# Patient Record
Sex: FEMALE | Race: WHITE | NOT HISPANIC OR LATINO | Employment: UNEMPLOYED | URBAN - METROPOLITAN AREA
[De-identification: names, ages, dates, MRNs, and addresses within clinical notes are randomized per-mention and may not be internally consistent; named-entity substitution may affect disease eponyms.]

---

## 2017-01-02 ENCOUNTER — OFFICE VISIT (OUTPATIENT)
Dept: URGENT CARE | Facility: MEDICAL CENTER | Age: 22
End: 2017-01-02
Payer: COMMERCIAL

## 2017-01-02 PROCEDURE — G0382 LEV 3 HOSP TYPE B ED VISIT: HCPCS

## 2017-01-02 PROCEDURE — 99283 EMERGENCY DEPT VISIT LOW MDM: CPT

## 2017-01-16 ENCOUNTER — ALLSCRIPTS OFFICE VISIT (OUTPATIENT)
Dept: OTHER | Facility: OTHER | Age: 22
End: 2017-01-16

## 2017-02-01 ENCOUNTER — ALLSCRIPTS OFFICE VISIT (OUTPATIENT)
Dept: OTHER | Facility: OTHER | Age: 22
End: 2017-02-01

## 2017-02-01 ENCOUNTER — LAB CONVERSION - ENCOUNTER (OUTPATIENT)
Dept: OTHER | Facility: OTHER | Age: 22
End: 2017-02-01

## 2017-02-01 DIAGNOSIS — R19.4 CHANGE IN BOWEL HABITS: ICD-10-CM

## 2017-02-01 DIAGNOSIS — R10.2 PELVIC AND PERINEAL PAIN: ICD-10-CM

## 2017-02-01 LAB — ERYTHROCYTE SEDIMENTATION RATE (HISTORICAL): 6 MM/H

## 2017-02-02 ENCOUNTER — GENERIC CONVERSION - ENCOUNTER (OUTPATIENT)
Dept: OTHER | Facility: OTHER | Age: 22
End: 2017-02-02

## 2017-02-02 ENCOUNTER — LAB CONVERSION - ENCOUNTER (OUTPATIENT)
Dept: OTHER | Facility: OTHER | Age: 22
End: 2017-02-02

## 2017-02-02 LAB
25(OH)D3 SERPL-MCNC: 20 NG/ML (ref 30–100)
A/G RATIO (HISTORICAL): 1.5 (CALC) (ref 1–2.5)
ALBUMIN SERPL BCP-MCNC: 4.2 G/DL (ref 3.6–5.1)
ALP SERPL-CCNC: 72 U/L (ref 33–115)
ALT SERPL W P-5'-P-CCNC: 9 U/L (ref 6–29)
AST SERPL W P-5'-P-CCNC: 17 U/L (ref 10–30)
BASOPHILS # BLD AUTO: 0.4 %
BASOPHILS # BLD AUTO: 16 CELLS/UL (ref 0–200)
BILIRUB SERPL-MCNC: 0.3 MG/DL (ref 0.2–1.2)
BUN SERPL-MCNC: 13 MG/DL (ref 7–25)
BUN/CREA RATIO (HISTORICAL): NORMAL (CALC) (ref 6–22)
CALCIUM SERPL-MCNC: 9 MG/DL (ref 8.6–10.2)
CHLORIDE SERPL-SCNC: 104 MMOL/L (ref 98–110)
CO2 SERPL-SCNC: 27 MMOL/L (ref 20–31)
CREAT SERPL-MCNC: 0.83 MG/DL (ref 0.5–1.1)
DEPRECATED RDW RBC AUTO: 12.6 % (ref 11–15)
EGFR AFRICAN AMERICAN (HISTORICAL): 114 ML/MIN/1.73M2
EGFR-AMERICAN CALC (HISTORICAL): 99 ML/MIN/1.73M2
EOSINOPHIL # BLD AUTO: 0.9 %
EOSINOPHIL # BLD AUTO: 36 CELLS/UL (ref 15–500)
GAMMA GLOBULIN (HISTORICAL): 2.8 G/DL (CALC) (ref 1.9–3.7)
GLUCOSE (HISTORICAL): 67 MG/DL (ref 65–99)
HCT VFR BLD AUTO: 34.9 % (ref 35–45)
HGB BLD-MCNC: 11.6 G/DL (ref 11.7–15.5)
LYMPHOCYTES # BLD AUTO: 1796 CELLS/UL (ref 850–3900)
LYMPHOCYTES # BLD AUTO: 44.9 %
MCH RBC QN AUTO: 29.6 PG (ref 27–33)
MCHC RBC AUTO-ENTMCNC: 33.3 G/DL (ref 32–36)
MCV RBC AUTO: 88.9 FL (ref 80–100)
MONOCYTES # BLD AUTO: 372 CELLS/UL (ref 200–950)
MONOCYTES (HISTORICAL): 9.3 %
NEUTROPHILS # BLD AUTO: 1780 CELLS/UL (ref 1500–7800)
NEUTROPHILS # BLD AUTO: 44.5 %
PLATELET # BLD AUTO: 214 THOUSAND/UL (ref 140–400)
PMV BLD AUTO: 8.6 FL (ref 7.5–12.5)
POTASSIUM SERPL-SCNC: 3.8 MMOL/L (ref 3.5–5.3)
RBC # BLD AUTO: 3.93 MILLION/UL (ref 3.8–5.1)
SODIUM SERPL-SCNC: 139 MMOL/L (ref 135–146)
TOTAL PROTEIN (HISTORICAL): 7 G/DL (ref 6.1–8.1)
TSH SERPL DL<=0.05 MIU/L-ACNC: 1.07 MIU/L
WBC # BLD AUTO: 4 THOUSAND/UL (ref 3.8–10.8)

## 2017-02-07 ENCOUNTER — ALLSCRIPTS OFFICE VISIT (OUTPATIENT)
Dept: OTHER | Facility: OTHER | Age: 22
End: 2017-02-07

## 2017-02-09 ENCOUNTER — GENERIC CONVERSION - ENCOUNTER (OUTPATIENT)
Dept: OTHER | Facility: OTHER | Age: 22
End: 2017-02-09

## 2017-02-15 ENCOUNTER — ALLSCRIPTS OFFICE VISIT (OUTPATIENT)
Dept: OTHER | Facility: OTHER | Age: 22
End: 2017-02-15

## 2017-02-15 PROCEDURE — G0145 SCR C/V CYTO,THINLAYER,RESCR: HCPCS | Performed by: OBSTETRICS & GYNECOLOGY

## 2017-02-16 ENCOUNTER — LAB REQUISITION (OUTPATIENT)
Dept: LAB | Facility: HOSPITAL | Age: 22
End: 2017-02-16
Payer: COMMERCIAL

## 2017-02-16 DIAGNOSIS — Z01.419 ENCOUNTER FOR GYNECOLOGICAL EXAMINATION WITHOUT ABNORMAL FINDING: ICD-10-CM

## 2017-02-20 ENCOUNTER — ANESTHESIA EVENT (OUTPATIENT)
Dept: GASTROENTEROLOGY | Facility: HOSPITAL | Age: 22
End: 2017-02-20
Payer: COMMERCIAL

## 2017-02-21 ENCOUNTER — GENERIC CONVERSION - ENCOUNTER (OUTPATIENT)
Dept: OTHER | Facility: OTHER | Age: 22
End: 2017-02-21

## 2017-02-21 ENCOUNTER — ANESTHESIA (OUTPATIENT)
Dept: GASTROENTEROLOGY | Facility: HOSPITAL | Age: 22
End: 2017-02-21
Payer: COMMERCIAL

## 2017-02-21 ENCOUNTER — HOSPITAL ENCOUNTER (OUTPATIENT)
Facility: HOSPITAL | Age: 22
Setting detail: OUTPATIENT SURGERY
Discharge: HOME | End: 2017-02-21
Attending: INTERNAL MEDICINE | Admitting: INTERNAL MEDICINE
Payer: COMMERCIAL

## 2017-02-21 VITALS
SYSTOLIC BLOOD PRESSURE: 113 MMHG | OXYGEN SATURATION: 10 % | DIASTOLIC BLOOD PRESSURE: 74 MMHG | HEIGHT: 62 IN | WEIGHT: 127.21 LBS | RESPIRATION RATE: 16 BRPM | BODY MASS INDEX: 23.41 KG/M2 | TEMPERATURE: 98.2 F | HEART RATE: 77 BPM

## 2017-02-21 DIAGNOSIS — R19.4 CHANGE IN BOWEL HABITS: ICD-10-CM

## 2017-02-21 DIAGNOSIS — R10.9 ABDOMINAL PAIN: ICD-10-CM

## 2017-02-21 LAB — EXT PREGNANCY TEST URINE: NEGATIVE

## 2017-02-21 PROCEDURE — 81025 URINE PREGNANCY TEST: CPT | Performed by: ANESTHESIOLOGY

## 2017-02-21 PROCEDURE — 88342 IMHCHEM/IMCYTCHM 1ST ANTB: CPT | Performed by: INTERNAL MEDICINE

## 2017-02-21 PROCEDURE — 88305 TISSUE EXAM BY PATHOLOGIST: CPT | Performed by: INTERNAL MEDICINE

## 2017-02-21 RX ORDER — ALBUTEROL SULFATE 90 UG/1
2 AEROSOL, METERED RESPIRATORY (INHALATION) EVERY 6 HOURS PRN
COMMUNITY

## 2017-02-21 RX ORDER — PROPOFOL 10 MG/ML
INJECTION, EMULSION INTRAVENOUS AS NEEDED
Status: DISCONTINUED | OUTPATIENT
Start: 2017-02-21 | End: 2017-02-21 | Stop reason: SURG

## 2017-02-21 RX ORDER — SODIUM CHLORIDE, SODIUM LACTATE, POTASSIUM CHLORIDE, CALCIUM CHLORIDE 600; 310; 30; 20 MG/100ML; MG/100ML; MG/100ML; MG/100ML
125 INJECTION, SOLUTION INTRAVENOUS CONTINUOUS
Status: DISCONTINUED | OUTPATIENT
Start: 2017-02-21 | End: 2017-02-21 | Stop reason: HOSPADM

## 2017-02-21 RX ADMIN — SODIUM CHLORIDE, SODIUM LACTATE, POTASSIUM CHLORIDE, AND CALCIUM CHLORIDE: 600; 310; 30; 20 INJECTION, SOLUTION INTRAVENOUS at 07:56

## 2017-02-21 RX ADMIN — PROPOFOL 50 MG: 10 INJECTION, EMULSION INTRAVENOUS at 08:15

## 2017-02-21 RX ADMIN — PROPOFOL 100 MG: 10 INJECTION, EMULSION INTRAVENOUS at 08:00

## 2017-02-21 RX ADMIN — PROPOFOL 50 MG: 10 INJECTION, EMULSION INTRAVENOUS at 08:19

## 2017-02-21 RX ADMIN — PROPOFOL 50 MG: 10 INJECTION, EMULSION INTRAVENOUS at 08:01

## 2017-02-21 RX ADMIN — SODIUM CHLORIDE, SODIUM LACTATE, POTASSIUM CHLORIDE, AND CALCIUM CHLORIDE 125 ML/HR: 600; 310; 30; 20 INJECTION, SOLUTION INTRAVENOUS at 07:41

## 2017-02-21 RX ADMIN — PROPOFOL 50 MG: 10 INJECTION, EMULSION INTRAVENOUS at 08:07

## 2017-02-21 RX ADMIN — PROPOFOL 50 MG: 10 INJECTION, EMULSION INTRAVENOUS at 08:12

## 2017-02-21 RX ADMIN — PROPOFOL 50 MG: 10 INJECTION, EMULSION INTRAVENOUS at 08:02

## 2017-02-21 RX ADMIN — SODIUM CHLORIDE, SODIUM LACTATE, POTASSIUM CHLORIDE, AND CALCIUM CHLORIDE: 600; 310; 30; 20 INJECTION, SOLUTION INTRAVENOUS at 08:20

## 2017-02-23 LAB
LAB AP GYN PRIMARY INTERPRETATION: NORMAL
Lab: NORMAL

## 2017-02-24 ENCOUNTER — GENERIC CONVERSION - ENCOUNTER (OUTPATIENT)
Dept: OTHER | Facility: OTHER | Age: 22
End: 2017-02-24

## 2017-02-27 ENCOUNTER — HOSPITAL ENCOUNTER (OUTPATIENT)
Dept: ULTRASOUND IMAGING | Facility: HOSPITAL | Age: 22
Discharge: HOME | End: 2017-02-27
Attending: OBSTETRICS & GYNECOLOGY
Payer: COMMERCIAL

## 2017-02-27 DIAGNOSIS — R10.2 PELVIC AND PERINEAL PAIN: ICD-10-CM

## 2017-02-27 PROCEDURE — 76856 US EXAM PELVIC COMPLETE: CPT

## 2017-02-27 PROCEDURE — 76830 TRANSVAGINAL US NON-OB: CPT

## 2017-03-01 ENCOUNTER — GENERIC CONVERSION - ENCOUNTER (OUTPATIENT)
Dept: OTHER | Facility: OTHER | Age: 22
End: 2017-03-01

## 2017-03-06 ENCOUNTER — GENERIC CONVERSION - ENCOUNTER (OUTPATIENT)
Dept: OTHER | Facility: OTHER | Age: 22
End: 2017-03-06

## 2017-03-31 ENCOUNTER — GENERIC CONVERSION - ENCOUNTER (OUTPATIENT)
Dept: OTHER | Facility: OTHER | Age: 22
End: 2017-03-31

## 2017-04-17 DIAGNOSIS — R10.2 PELVIC AND PERINEAL PAIN: ICD-10-CM

## 2017-04-17 DIAGNOSIS — Z97.5 PRESENCE OF (INTRAUTERINE) CONTRACEPTIVE DEVICE: ICD-10-CM

## 2017-06-22 ENCOUNTER — ALLSCRIPTS OFFICE VISIT (OUTPATIENT)
Dept: OTHER | Facility: OTHER | Age: 22
End: 2017-06-22

## 2017-07-06 ENCOUNTER — HOSPITAL ENCOUNTER (OUTPATIENT)
Dept: RADIOLOGY | Facility: HOSPITAL | Age: 22
Discharge: HOME | End: 2017-07-06
Attending: OBSTETRICS & GYNECOLOGY
Payer: COMMERCIAL

## 2017-07-06 DIAGNOSIS — Z97.5 PRESENCE OF (INTRAUTERINE) CONTRACEPTIVE DEVICE: ICD-10-CM

## 2017-07-06 DIAGNOSIS — R10.2 PELVIC AND PERINEAL PAIN: ICD-10-CM

## 2017-07-06 PROCEDURE — 76856 US EXAM PELVIC COMPLETE: CPT

## 2017-07-06 PROCEDURE — 76830 TRANSVAGINAL US NON-OB: CPT

## 2017-07-07 ENCOUNTER — ALLSCRIPTS OFFICE VISIT (OUTPATIENT)
Dept: OTHER | Facility: OTHER | Age: 22
End: 2017-07-07

## 2017-07-11 ENCOUNTER — GENERIC CONVERSION - ENCOUNTER (OUTPATIENT)
Dept: OTHER | Facility: OTHER | Age: 22
End: 2017-07-11

## 2017-07-17 ENCOUNTER — ALLSCRIPTS OFFICE VISIT (OUTPATIENT)
Dept: OTHER | Facility: OTHER | Age: 22
End: 2017-07-17

## 2017-07-17 LAB — S PYO AG THROAT QL: NEGATIVE

## 2017-10-02 ENCOUNTER — ALLSCRIPTS OFFICE VISIT (OUTPATIENT)
Dept: OTHER | Facility: OTHER | Age: 22
End: 2017-10-02
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
## 2017-10-05 ENCOUNTER — ALLSCRIPTS OFFICE VISIT (OUTPATIENT)
Dept: OTHER | Facility: OTHER | Age: 22
End: 2017-10-05
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
## 2018-01-10 NOTE — RESULT NOTES
Message   Thyroid is stable-  please add vitamin d 4,000 iu daily; mild anemia- may be due to stomach issues  we will follow  Verified Results  (Q) TSH, 3RD GENERATION [id number] 01:33PM Armando Blum     Test Name Result Flag Reference   TSH 1 07 mIU/L     Reference Range                         > or = 20 Years  0 40-4 50                              Pregnancy Ranges            First trimester    0 26-2 66            Second trimester   0 55-2 73            Third trimester    0 43-2 91     *(Q) VITAMIN D, 25-HYDROXY, LC/MS/MS 17GXB5165 01:33PM Armando Blum   REPORT COMMENT:  FASTING:NO     Test Name Result Flag Reference   VITAMIN D, 25-OH, TOTAL 20 ng/mL L    Vitamin D Status         25-OH Vitamin D:     Deficiency:                    <20 ng/mL  Insufficiency:             20 - 29 ng/mL  Optimal:                 > or = 30 ng/mL     For 25-OH Vitamin D testing on patients on   D2-supplementation and patients for whom quantitation   of D2 and D3 fractions is required, the QuestAssureD(TM)  25-OH VIT D, (D2,D3), LC/MS/MS is recommended: order   code 74445 (patients >2yrs)  For more information on this test, go to:  [email or web address]  (This link is being provided for   informational/educational purposes only )     (1) COMPREHENSIVE METABOLIC PANEL 31CVD9311 49:09RT Armando Blum     Test Name Result Flag Reference   GLUCOSE 67 mg/dL  65-99   Fasting reference interval   UREA NITROGEN (BUN) 13 mg/dL  7-25   CREATININE 0 83 mg/dL  0 50-1 10   eGFR NON-AFR   AMERICAN 99 mL/min/1 73m2  > OR = 60   eGFR AFRICAN AMERICAN 114 mL/min/1 73m2  > OR = 60   BUN/CREATININE RATIO   5-25   NOT APPLICABLE (calc)   SODIUM 139 mmol/L  135-146   POTASSIUM 3 8 mmol/L  3 5-5 3   CHLORIDE 104 mmol/L     CARBON DIOXIDE 27 mmol/L  20-31   CALCIUM 9 0 mg/dL  8 6-10 2   PROTEIN, TOTAL 7 0 g/dL  6 1-8 1   ALBUMIN 4 2 g/dL  3 6-5 1   GLOBULIN 2 8 g/dL (calc)  1 9-3 7 ALBUMIN/GLOBULIN RATIO 1 5 (calc)  1 0-2 5   BILIRUBIN, TOTAL 0 3 mg/dL  0 2-1 2   ALKALINE PHOSPHATASE 72 U/L     AST 17 U/L  10-30   ALT 9 U/L  6-29     (1) CBC/PLT/DIFF 22SHV9622 01:33PM Jaquelin Rodriguez     Test Name Result Flag Reference   WHITE BLOOD CELL COUNT 4 0 Thousand/uL  3 8-10 8   RED BLOOD CELL COUNT 3 93 Million/uL  3 80-5 10   HEMOGLOBIN 11 6 g/dL L 11 7-15 5   HEMATOCRIT 34 9 % L 35 0-45 0   MCV 88 9 fL  80 0-100 0   MCH 29 6 pg  27 0-33 0   MCHC 33 3 g/dL  32 0-36 0   RDW 12 6 %  11 0-15 0   PLATELET COUNT 964 Thousand/uL  140-400   MPV 8 6 fL  7 5-12 5   ABSOLUTE NEUTROPHILS 1780 cells/uL  3288-8193   ABSOLUTE LYMPHOCYTES 1796 cells/uL  850-3900   ABSOLUTE MONOCYTES 372 cells/uL  200-950   ABSOLUTE EOSINOPHILS 36 cells/uL     ABSOLUTE BASOPHILS 16 cells/uL  0-200   NEUTROPHILS 44 5 %     LYMPHOCYTES 44 9 %     MONOCYTES 9 3 %     EOSINOPHILS 0 9 %     BASOPHILS 0 4 %

## 2018-01-10 NOTE — RESULT NOTES
Verified Results  * [facility] [id number] 83:53VQ Reesa Leyden Order Number: [id number]    - Patient Instructions: To schedule this appointment, please contact Central Scheduling at [phone]  Test Name Result Flag Reference   US PELVIS COMPLETE (TRANSABDOMINAL AND TRANSVAGINAL) (Report)     PELVIC ULTRASOUND, COMPLETE     INDICATION: Right lower quadrant cramping  Check IUD placement  LMP was February 1, 2017      COMPARISON: February 1, 2016     TECHNIQUE:  Transabdominal pelvic ultrasound was performed in sagittal and transverse planes with a curvilinear transducer  Additional transvaginal imaging was performed to better evaluate the endometrium and ovaries  Imaging included volumetric    sweeps as well as traditional still imaging technique  FINDINGS:     UTERUS:   The uterus is anteverted in position, measuring 6 7 x 3 2 x 4 3 cm  Contour and echotexture appear normal      The cervix shows no suspicious abnormality  ENDOMETRIUM:    Normal caliber of 3 mm  No suspicious endometrial pathology visible  The intrauterine device appears satisfactory in position  OVARIES/ADNEXA:   Right ovary: 3 0 x 2 4 x 2 3 cm  A circumscribed hypoechoic region within the ovary measuring 2 2 x 1 5 x 1 8 cm demonstrates some minimal peripheral blood flow but no significant internal blood flow  This is probably a hemorrhagic cyst    Doppler flow within normal limits  Left ovary: 2 2 x 1 0 x 1 0 cm  No suspicious left ovarian abnormality  Doppler flow within normal limits  There are mildly prominent adnexal vessels on the left side which appear patent  Correlate for any typical signs or symptoms of pelvic congestion syndrome  No suspicious adnexal mass or loculated collections  There is no free fluid  IMPRESSION:      Probable hemorrhagic cyst in the right ovary   Satisfactory positioning of IUD in the endometrial canal  Possible pelvic congestion in the left adnexal area with some distended vessels noted            Workstation performed: [id number]     Signed by:   Rocael Benz MD   3/1/17

## 2018-01-10 NOTE — RESULT NOTES
Verified Results  TISSUE, SPECIMEN A 02Jun2016 12:00AM [employer]     Test Name Result Flag Reference   A SOURCE      Right arm, bicep area   A PROCEDURE      Biopsy/ies   A GROSS DESCRIPTION      Received in formalin labeled with the patient's   name is a punch biopsy of skin measuring 3 x 3 x   5 mm  The specimen is entirely submitted in one   cassette  A MICRO DESCRIPTION      In this biopsy, there is elongation of the rete   ridges with hyperpigmentation of the basal layer   and single and nested melanocytes at the   dermal-epidermal junction  Focally, the rete   ridges are fused and there is melanin   pigmentation in melanophages in the superficial   dermis  In the central portion of this specimen,   there are melanocytic nevus cell nests in the   superficial dermis     A DIAGNOSIS      LENTIGINOUS COMPOUND MELANOCYTIC NEVUS

## 2018-01-11 NOTE — RESULT NOTES
Verified Results  (1) THIN PREP PAP WITH IMAGING [id number] 18:75MB Dallin Asper     Test Name Result Flag Reference   LAB AP CASE REPORT (Report)     Gynecologic Cytology Report            Case: [id number]                  Authorizing Provider: [facility] DO DWIGHT     Collected:      02/15/2017           First Screen:     TONJA Hill Received:      02/17/2017 1008        Specimen:  LIQUID-BASED PAP, SCREENING, Endocervical   LAB AP GYN PRIMARY INTERPRETATION      Negative for intraepithelial lesion or malignancy  Electronically signed by TONJA Hill on 2/23/2017 at 3:51 PM   LAB AP GYN SPECIMEN ADEQUACY      Satisfactory for evaluation  Absence of endocervical/transformation zone component  LAB AP GYN ADDITIONAL INFORMATION (Report)     [employer]'s FDA approved ,  and ThinPrep Imaging System are   utilized with strict adherence to the 's instruction manual to   prepare gynecologic and non-gynecologic cytology specimens for the   production of ThinPrep slides as well as for gynecologic ThinPrep imaging  These processes have been validated by our laboratory and/or by the     The Pap test is not a diagnostic procedure and should not be used as the   sole means to detect cervical cancer  It is only a screening procedure to   aid in the detection of cervical cancer and its precursors  Both   false-negative and false-positive results have been experienced  Your   patient's test result should be interpreted in this context together with   the history and clinical findings

## 2018-01-12 VITALS
BODY MASS INDEX: 23.81 KG/M2 | SYSTOLIC BLOOD PRESSURE: 130 MMHG | HEART RATE: 87 BPM | DIASTOLIC BLOOD PRESSURE: 86 MMHG | HEIGHT: 62 IN | WEIGHT: 129.38 LBS

## 2018-01-12 VITALS
SYSTOLIC BLOOD PRESSURE: 116 MMHG | OXYGEN SATURATION: 99 % | TEMPERATURE: 98.2 F | DIASTOLIC BLOOD PRESSURE: 66 MMHG | RESPIRATION RATE: 20 BRPM | BODY MASS INDEX: 22.88 KG/M2 | WEIGHT: 131.2 LBS | HEART RATE: 90 BPM

## 2018-01-12 VITALS
WEIGHT: 130 LBS | SYSTOLIC BLOOD PRESSURE: 118 MMHG | BODY MASS INDEX: 23.78 KG/M2 | DIASTOLIC BLOOD PRESSURE: 70 MMHG | TEMPERATURE: 98.3 F | HEART RATE: 101 BPM | OXYGEN SATURATION: 98 %

## 2018-01-12 VITALS
DIASTOLIC BLOOD PRESSURE: 88 MMHG | SYSTOLIC BLOOD PRESSURE: 132 MMHG | WEIGHT: 132 LBS | HEART RATE: 88 BPM | HEIGHT: 62 IN | BODY MASS INDEX: 24.29 KG/M2 | RESPIRATION RATE: 16 BRPM

## 2018-01-12 NOTE — RESULT NOTES
Verified Results  * US PELVIS COMPLETE [facility] OF [place] AND TRANSVAGINAL) [id number] Mayra Coto Order Number: [id number]     Test Name Result Flag Reference   US PELVIS COMPLETE (TRANSABDOMINAL AND TRANSVAGINAL) (Report)     PELVIC ULTRASOUND, COMPLETE     INDICATION: 64-year-old female with pelvic pain  History of IUD inserted about 6 months ago  LMP was 1 week ago  COMPARISON: None  TECHNIQUE:  Transabdominal pelvic ultrasound was performed in sagittal and transverse planes with a curvilinear transducer  Additional transvaginal imaging was performed to better evaluate the endometrium and ovaries  Imaging included volumetric    sweeps as well as traditional still imaging technique  FINDINGS:     UTERUS:   The uterus is retroverted in position, measuring 6 7 x 3 1 x 4 2 cm  Contour and echotexture appear normal      The cervix shows no suspicious abnormality  ENDOMETRIUM:    Normal caliber of 4 mm  Homogenous and normal in appearance  An IUD is visualized and is in proper position within the endometrial cavity  OVARIES/ADNEXA:   Right ovary: 1 9 x 2 5 x 2 2 cm  No suspicious right ovarian abnormality  Doppler flow within normal limits  Left ovary: 2 8 x 1 5 x 2 0 cm  No suspicious left ovarian abnormality  Doppler flow within normal limits  No suspicious adnexal mass or loculated collections  There is small amount of simple free fluid in the pelvis, likely physiologic in this premenopausal female  IMPRESSION:      Normal pelvic ultrasound  An IUD is visualized and is in proper position within the endometrial cavity          Signed by:   Braydon Bejarano MD   2/1/16

## 2018-01-12 NOTE — RESULT NOTES
Message   Pelase inform the patient that the bx show inflamation in stomach but no bacteria  no need for repeat EGD unless alarm symptoms   Colon: normal, repeat at the age of 46   Followup in [facility] ein 3-4 months   Repeat at the age of 46     Verified Results  COLONOSCOPY [id number] 02:20PM Christiano De La Rosa     Test Name Result Flag Reference   Colonoscopy 02/2/2017        Summary / No summary entered :      No summary entered   Documents attached :      EPIC OP NOTE - Christiano De La Rosa; Enc: [id number] - Appointment -      Christiano De La Rosa - (Gastroenterology Adult) (Additional Information      Document)  (1) TISSUE EXAM [id number] 08:04AM Christiano De La Rosa     Test Name Result Flag Reference   LAB AP CASE REPORT (Report)     Surgical Pathology Report             Case: [id number]                   Authorizing Provider: Juan Jose Porter MD    Collected:      02/21/2017 0804        Ordering Location:   Located within [facility]    Received:      02/21/2017 [facility] Endoscopy                               Pathologist:      Francine Wray MD                              Specimens:  A) - Duodenum, R/o Celiac                                       B) - Stomach, Gastric body, r/o H  pylori                               C) - Colon, Terminal ilium, r/o Crohn's disease                            D) - Colon, Random, r/o Microscopic colitis   LAB AP FINAL DIAGNOSIS (Report)     A  Small intestine, duodenum, biopsy:    - No significant pathologic abnormalities  - No villous atrophy, increased intraepithelial lymphocytes or crypt   hyperplasia to suggest     malabsorptive enteropathy     - No active inflammation, granulomas, organisms, dysplasia or neoplasia   identified  B  Stomach, body, biopsy:    - Oxyntic mucosa with mild chronic inactive gastritis  - Immunostain for H  pylori (with appropriate positive control) is   negative  - No intestinal metaplasia, dysplasia or neoplasia identified       C  Small intestine, terminal ileum, biopsy:    - No significant pathologic abnormalities  - No active inflammation, granulomas, dysplasia or neoplasia   identified  D  Colon, random biosy:    - No significant pathologic abnormalities  - No active inflammation or histologic evidence of a microscopic   (lymphocytic) or collagenous colitis noted  - No granulomas, dysplasia or neoplasia identified  Electronically signed by Josh Paredes MD on 2/23/2017 at 10:50 AM   LAB AP SURGICAL ADDITIONAL INFORMATION (Report)     These tests were developed and their performance characteristics   determined by [facility] or [place]  They may not be cleared or approved by the [place]  Food and   Drug Administration  The FDA has determined that such clearance or   approval is not necessary  These tests are used for clinical purposes  They should not be regarded as investigational or for research  This   laboratory has been approved by [place] 88, designated as a high-complexity   laboratory and is qualified to perform these tests  LAB AP GROSS DESCRIPTION (Report)     A  The specimen is received in formalin, labeled with the patient's name   and hospital number, and is designated duodenum rule out celiac  The   specimen consists of 5 tan red soft tissue fragments each measuring   0 3-0 5 cm  Entirely submitted  One cassette  B  The specimen is received in formalin, labeled with the patient's name   and hospital number, and is designated gastric body rule out H  pylori  The specimen consists of 2 tan soft tissue fragments measuring 0 2 and 0 5   cm  Entirely submitted  One cassette  Note: The estimated total formalin fixation time based upon information   provided by the submitting clinician and the standard processing schedule   is 20 5 hours  C   The specimen is received in formalin, labeled with the patient's name   and hospital number, and is designated terminal ileum rule out Crohn's   disease  The specimen consists of 2 tan soft tissue fragments measuring   0 3 and 0 5 cm  Entirely submitted  One cassette  D  The specimen is received in formalin, labeled with the patient's name   and hospital number, and is designated random colon rule out microscopic   colitis  The specimen consists of 4 tan soft tissue fragments each   measuring 0 4-0 5 cm  Entirely submitted  One cassette  Note: The estimated total formalin fixation time based upon information   provided by the submitting clinician and the standard processing schedule   is 20 25 hours      [facility]   LAB AP CLINICAL INFORMATION R/o celiac     R/o celiac

## 2018-01-12 NOTE — RESULT NOTES
Verified Results  * [facility]TE [facility] [id number] 54:68YE Maikel Britt Order Number: [id number]    - Patient Instructions: To schedule this appointment, please contact Central Scheduling at [phone]  Test Name Result Flag Reference   US PELVIS COMPLETE (TRANSABDOMINAL AND TRANSVAGINAL) (Report)     PELVIC ULTRASOUND, COMPLETE     INDICATION: R10 2: Pelvic and perineal pain   Z97 5: Presence of (intrauterine) contraceptive device  History taken directly from the electronic ordering system  Patient has IUD in place  COMPARISON: Pelvic ultrasound from 2/27/2017  TECHNIQUE:  Transabdominal pelvic ultrasound was performed in sagittal and transverse planes with a curvilinear transducer  Additional transvaginal imaging was performed to better evaluate the endometrium and ovaries  Imaging included volumetric    sweeps as well as traditional still imaging technique  FINDINGS:     UTERUS:   The uterus is anteverted in position, measuring 7 3 x 2 3 x 4 1 cm  Contour and echotexture appear normal      The cervix shows no suspicious abnormality  ENDOMETRIUM:    Normal caliber of 3 mm  An IUD is in proper position within the endometrial cavity  Homogenous and normal in appearance  OVARIES/ADNEXA:   Right ovary: 2 5 x 1 6 x 2 4 cm  No suspicious right ovarian abnormality  Doppler flow within normal limits  Left ovary: 2 0 x 2 3 x 1 7 cm  No suspicious left ovarian abnormality  Doppler flow within normal limits  No suspicious adnexal mass or loculated collections  There is no free fluid  IMPRESSION:      Normal pelvic ultrasound  An IUD is in proper position within the endometrial cavity            Workstation performed: [id number]     Signed by:   Jose Anderson MD   7/11/17

## 2018-01-12 NOTE — MISCELLANEOUS
Dear Shamar Merchant,    Attempted to contact you regarding results and to schedule a follow-up appointment  Please contact the office at your convenience      Thank you,    Dr Real Montanez and staff        Electronically signed by:Radha Meza MA  Mar 31 2017  9:09AM EST Author

## 2018-01-13 VITALS
BODY MASS INDEX: 24.11 KG/M2 | HEART RATE: 72 BPM | TEMPERATURE: 98.4 F | RESPIRATION RATE: 18 BRPM | DIASTOLIC BLOOD PRESSURE: 84 MMHG | HEIGHT: 62 IN | SYSTOLIC BLOOD PRESSURE: 122 MMHG | WEIGHT: 131 LBS

## 2018-01-13 VITALS
DIASTOLIC BLOOD PRESSURE: 82 MMHG | WEIGHT: 130.38 LBS | HEART RATE: 70 BPM | RESPIRATION RATE: 16 BRPM | TEMPERATURE: 98.1 F | BODY MASS INDEX: 23.1 KG/M2 | SYSTOLIC BLOOD PRESSURE: 120 MMHG | HEIGHT: 63 IN

## 2018-01-14 VITALS
HEART RATE: 72 BPM | RESPIRATION RATE: 16 BRPM | SYSTOLIC BLOOD PRESSURE: 118 MMHG | TEMPERATURE: 98.9 F | HEIGHT: 63 IN | WEIGHT: 131.13 LBS | BODY MASS INDEX: 23.23 KG/M2 | DIASTOLIC BLOOD PRESSURE: 78 MMHG

## 2018-01-14 VITALS
DIASTOLIC BLOOD PRESSURE: 82 MMHG | HEART RATE: 80 BPM | BODY MASS INDEX: 24.88 KG/M2 | TEMPERATURE: 97.6 F | WEIGHT: 135.2 LBS | RESPIRATION RATE: 16 BRPM | SYSTOLIC BLOOD PRESSURE: 124 MMHG | HEIGHT: 62 IN

## 2018-01-14 VITALS
HEART RATE: 90 BPM | BODY MASS INDEX: 24.93 KG/M2 | DIASTOLIC BLOOD PRESSURE: 80 MMHG | SYSTOLIC BLOOD PRESSURE: 126 MMHG | HEIGHT: 62 IN | WEIGHT: 135.5 LBS

## 2018-01-14 NOTE — PROGRESS NOTES
Patient Health Assessment    Date:            12/07/2016  Blood Pressure:  121/85  Pulse:           65  Age:             21  Weight:          130 lbs  Height/Length:   5' 2"  Body Mass Index: 23 7  Provider:        [email or web address]  Clinic:          [facility]    RECALL EXAM, ADULT PROPHY , 1 BWX LEFT PER DR REQUEST  Medical Alert: Anemia    Asthma  Medications: Citalopram HBr:  Since Last Visit: Medical Alert: No Change    Medications: No Change    Allergies:        No Change  Pain Scale Type: Numeric Pain ScalePain Level: 0  Description: no current dental pain  pt reports some sensivity to cold and brushing / pt is using soft toothbrush  / Sensodyne  scaled, polished and flossed- light calculus, plaque and bleeding  Dr Jenny Meier exam= no findings  nv= 6 mth recall    ----- Signed on Wednesday, December 07, 2016 at 1:41:24 PM  -----  ----- Provider: [email or web address] - Keyur Hinojosa RD -- Clinic: [facility] -----

## 2018-01-16 NOTE — RESULT NOTES
Message   Please add vitamin D 5,000 iu daily  Thyroid is in normal range  Verified Results  (Q) TSH, 3RD GENERATION [id number] 01:33PM Alex Part     Test Name Result Flag Reference   TSH 1 07 mIU/L     Reference Range                         > or = 20 Years  0 40-4 50                              Pregnancy Ranges            First trimester    0 26-2 66            Second trimester   0 55-2 73            Third trimester    0 43-2 91     *(Q) VITAMIN D, 25-HYDROXY, LC/MS/MS [id number] 01:33PM Alex Part   REPORT COMMENT:  FASTING:NO     Test Name Result Flag Reference   VITAMIN D, 25-OH, TOTAL 20 ng/mL L    Vitamin D Status         25-OH Vitamin D:     Deficiency:                    <20 ng/mL  Insufficiency:             20 - 29 ng/mL  Optimal:                 > or = 30 ng/mL     For 25-OH Vitamin D testing on patients on   D2-supplementation and patients for whom quantitation   of D2 and D3 fractions is required, the QuestAssureD(TM)  25-OH VIT D, (D2,D3), LC/MS/MS is recommended: order   code 04214 (patients >2yrs)  For more information on this test, go to:  http://education  Lingohub/faq/NTQ599  (This link is being provided for   informational/educational purposes only )
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
## 2018-01-18 NOTE — RESULT NOTES
Verified Results  (1) CBC/PLT/DIFF [id number] 12:37PM [facility]     Test Name Result Flag Reference   WHITE BLOOD CELL COUNT 4 3 Thousand/uL  3 8-10 8   RED BLOOD CELL COUNT 4 20 Million/uL  3 80-5 10   HEMOGLOBIN 12 7 g/dL  11 7-15 5   HEMATOCRIT 38 5 %  35 0-45 0   MCV 91 7 fL  80 0-100 0   MCH 30 3 pg  27 0-33 0   MCHC 33 0 g/dL  32 0-36 0   RDW 12 5 %  11 0-15 0   PLATELET COUNT 346 Thousand/uL  140-400   MPV 9 2 fL  7 5-11 5   ABSOLUTE NEUTROPHILS 1952 cells/uL  0275-7492   ABSOLUTE LYMPHOCYTES 2008 cells/uL  850-3900   ABSOLUTE MONOCYTES 301 cells/uL  200-950   ABSOLUTE EOSINOPHILS 26 cells/uL     ABSOLUTE BASOPHILS 13 cells/uL  0-200   NEUTROPHILS 45 4 %     LYMPHOCYTES 46 7 %     MONOCYTES 7 0 %     EOSINOPHILS 0 6 %     BASOPHILS 0 3 %       (1) COMPREHENSIVE METABOLIC PANEL [id number] 81:45GF [facility]     Test Name Result Flag Reference   GLUCOSE 89 mg/dL  65-99   Fasting reference interval   UREA NITROGEN (BUN) 15 mg/dL  7-25   CREATININE 0 84 mg/dL  0 50-1 10   eGFR NON-AFR   AMERICAN 98 mL/min/1 73m2  > OR = 60   eGFR AFRICAN AMERICAN 114 mL/min/1 73m2  > OR = 60   BUN/CREATININE RATIO   5-94   NOT APPLICABLE (calc)   SODIUM 137 mmol/L  135-146   POTASSIUM 3 8 mmol/L  3 5-5 3   CHLORIDE 102 mmol/L     CARBON DIOXIDE 27 mmol/L  20-31   CALCIUM 9 5 mg/dL  8 6-10 2   PROTEIN, TOTAL 7 5 g/dL  6 1-8 1   ALBUMIN 4 6 g/dL  3 6-5 1   GLOBULIN 2 9 g/dL (calc)  1 9-3 7   ALBUMIN/GLOBULIN RATIO 1 6 (calc)  1 0-2 5   BILIRUBIN, TOTAL 0 4 mg/dL  0 2-1 2   ALKALINE PHOSPHATASE 72 U/L     AST 16 U/L  10-30   ALT 12 U/L  6-29     (Q) TSH, 3RD GENERATION [id number] 12:37PM [facility]     Test Name Result Flag Reference   TSH 1 64 mIU/L     Reference Range                         > or = 20 Years  0 40-4 50                              Pregnancy Ranges            First trimester    0 26-2 66            Second trimester   0 55-2 73            Third trimester    0 43-2 91 *(Q) VITAMIN D, 25-HYDROXY, LC/MS/MS [id number] 12:37PM Fabiola Solid   REPORT COMMENT:  FASTING:YES     Test Name Result Flag Reference   VITAMIN D, 25-OH, TOTAL 24 ng/mL L    Vitamin D Status         25-OH Vitamin D:     Deficiency:                    <20 ng/mL  Insufficiency:             20 - 29 ng/mL  Optimal:                 > or = 30 ng/mL     For 25-OH Vitamin D testing on patients on   D2-supplementation and patients for whom quantitation   of D2 and D3 fractions is required, the QuestAssureD(TM)  25-OH VIT D, (D2,D3), LC/MS/MS is recommended: order   code 50379 (patients >2yrs)  For more information on this test, go to:  http://Lucky Pai/faq/VKC787  (This link is being provided for   informational/educational purposes only )

## 2018-01-24 NOTE — PROCEDURES
Chief Complaint    1  Skin Lesions  patient here for nevus x2 that have changed, gotten larger and darker      Current Meds   1  Citalopram Hydrobromide 10 MG Oral Tablet; Take 1 tablet daily; Therapy: 69KEI2745 to (Evaluate:53Tbw2446)  Requested for: 26IHA5110; Last   Rx:25May2016 Ordered   2  Ibuprofen 200 MG Oral Capsule; Therapy: (Recorded:14Jan2016) to Recorded   3  Naproxen 500 MG Oral Tablet; Therapy: 04ALU2529 to (Evaluate:23May2016) Recorded   4  Sharon 13 5 MG Intrauterine Intrauterine Device; Therapy: (Recorded:96Mmr3554) to Recorded    Allergies    1  No Known Drug Allergies    2  No Known Environmental Allergies   3  No Known Food Allergies    Vitals  Signs [Data Includes: Current Encounter]    Temperature: 96 9 F  Heart Rate: 82  Respiration: 16  Systolic: 766  Diastolic: 74  Height: 5 ft 2 5 in  Weight: 131 lb 8 0 oz  BMI Calculated: 23 67  BSA Calculated: 1 61    Procedure    Procedure: excision of lesion  Indications for the procedure include the lesion has changed (RIGHT BICEP AREA 6 MM ;DARKLY PIGMENTED LESION ;[address]; SECOND LESION LEFT HIP 4 MM DIAM IWTH IRREG BORDERS )  Risks and infection risk were discussed with the patient  Written consent was obtained prior to the procedure  Procedure Note:   Anesthesia: 1CC TOTAL ml of bupivacaine 0 5% without epinephrine  Of benzocaine  The lesion was located on the RIGHT BICEP; LEFT HIP  The patient was prepped and draped in the usual sterile fashion using Betadine and using alcohol  a 4MM SHAVE LEFT HIP mm shave biopsy of the lesion was taken and PUNCH BIOPSY RIGHT BICEP 6 MM- ENTIRELY REMOVED- SENT TO PATH; SHAVE BIOPSY LEF THIP- DERMA BLADE USED- SENT TO PATH; ALCL USED TO CLOSE WOUND ON LEFT HIP;; 3 INTERRUPTED MATTERESS SUTURES 5-0 PROLENE USED TO CLOSE RIGHT BICEP WOUNDFOLLOWU P10 DAYS  Closure: horizontal mattress sutures were used for the skin closure  The subcutaneous layer was closed with 3 5-0  Dressing:  The wound was cleaned and a sterile dressing was placed  Post-Procedure:      Assessment    1  Dysplastic nevus of trunk (216 5) (D22 5)    Discussion/Summary    SHAVE BIOPSY 4 MM LEFT HIP  6MM PUNCH BIOPSY RIGHT BICEP=  SENT FOR PATH  FOLLOW UP 10 DAY FOR SUTURE REMOVAL  ADVISED OF POSSIBLE SCAR  Signatures   Electronically signed by :  Aurora 92, DO; Jun 2 2016  1:12PM EST                       (Author)

## 2018-02-22 ENCOUNTER — OFFICE VISIT (OUTPATIENT)
Dept: FAMILY MEDICINE CLINIC | Facility: CLINIC | Age: 23
End: 2018-02-22
Payer: COMMERCIAL

## 2018-02-22 VITALS
WEIGHT: 136.6 LBS | HEART RATE: 82 BPM | DIASTOLIC BLOOD PRESSURE: 90 MMHG | BODY MASS INDEX: 25.14 KG/M2 | SYSTOLIC BLOOD PRESSURE: 138 MMHG | HEIGHT: 62 IN | RESPIRATION RATE: 16 BRPM | TEMPERATURE: 99.4 F

## 2018-02-22 DIAGNOSIS — J01.40 ACUTE NON-RECURRENT PANSINUSITIS: Primary | ICD-10-CM

## 2018-02-22 DIAGNOSIS — R03.0 ELEVATED BLOOD PRESSURE READING: ICD-10-CM

## 2018-02-22 PROBLEM — E55.9 VITAMIN D DEFICIENCY: Status: ACTIVE | Noted: 2018-02-22

## 2018-02-22 PROCEDURE — 99214 OFFICE O/P EST MOD 30 MIN: CPT | Performed by: FAMILY MEDICINE

## 2018-02-22 RX ORDER — DROSPIRENONE AND ETHINYL ESTRADIOL TABLETS 0.02-3(28)
1 KIT ORAL DAILY
Refills: 3 | COMMUNITY
Start: 2018-02-12 | End: 2018-03-27 | Stop reason: ALTCHOICE

## 2018-02-22 RX ORDER — ERGOCALCIFEROL 1.25 MG/1
50000 CAPSULE ORAL
Refills: 0 | Status: CANCELLED | OUTPATIENT
Start: 2018-02-22

## 2018-02-22 RX ORDER — NAPROXEN 500 MG/1
TABLET ORAL
Qty: 20 TABLET | Refills: 0 | Status: SHIPPED | OUTPATIENT
Start: 2018-02-22 | End: 2018-05-02

## 2018-02-22 RX ORDER — AMOXICILLIN AND CLAVULANATE POTASSIUM 875; 125 MG/1; MG/1
1 TABLET, FILM COATED ORAL EVERY 12 HOURS SCHEDULED
Qty: 14 TABLET | Refills: 0 | Status: SHIPPED | OUTPATIENT
Start: 2018-02-22 | End: 2018-03-01

## 2018-02-22 RX ORDER — ALPRAZOLAM 0.25 MG/1
0.25 TABLET ORAL 3 TIMES DAILY PRN
COMMUNITY
Start: 2017-04-18 | End: 2018-12-06

## 2018-02-22 NOTE — PATIENT INSTRUCTIONS
-Start taking Mucinex DM 1200mg twice a day  -Drink at least 10 glasses of water per day  -Honey as been shown to help with coughs  -You can use Tylenol as needed for fevers  -Practice good hygiene and cover your mouth if you cough  -Call us back if you have new or worsening fevers and other symptoms  -Patient instructions handout provided      Sinusitis, Ambulatory Care   GENERAL INFORMATION:   Sinusitis  is inflammation or infection of your sinuses  It is most often caused by a virus  Acute sinusitis may last up to 12 weeks  Chronic sinusitis lasts longer than 12 weeks  Recurrent sinusitis is when you have 3 or more episodes of sinusitis in 1 year  Common symptoms include the following:   · Fever    · Pain, pressure, redness, or swelling around the forehead, cheeks, or eyes    · Thick yellow or green discharge from your nose    · Tenderness when you touch your face over your sinuses    · Dry cough that happens mostly at night or when you lie down    · Headache and face pain that is worse when you lean forward    · Teeth pain or pain when you chew  Seek immediate care for the following symptoms:   · Vision changes such as double vision    · Confusion or trouble thinking clearly    · Headache and stiff neck    · Trouble breathing  Treatment for sinusitis  may include medicines to relieve nasal and sinus congestion or to decrease pain and fever  Ask your healthcare provider which medicines you should take and how much is safe  Manage sinusitis:   · Drink liquids as directed  Ask your healthcare provider how much liquid to drink each day and which liquids are best for you  Liquids will help loosen and drain the mucus in your sinuses  · Breathe in steam   Heat a bowl of water until you see steam  Lean over the bowl and make a tent over your head with a large towel  Breathe deeply for about 20 minutes  Be careful not to get too close to the steam or burn yourself  Do this 3 times a day   You can also breathe deeply when you take a hot shower  · Rinse your sinuses  Use a sinus rinse device to rinse your nasal passages with a saline (salt water) solution  This will help thin the mucus in your nose and rinse away pollen and dirt  It will also help reduce swelling so you can breathe normally  Ask how often to do this  · Use heat on your sinuses  to decrease pain  Apply heat for 15 to 20 minutes every hour for as many days as directed  · Sleep with your head elevated  Place an extra pillow under your head before you go to sleep to help your sinuses drain  · Do not smoke and avoid secondhand smoke  If you smoke, it is never too late to quit  Ask for information about how to stop smoking if you need help  Prevent the spread of germs that cause sinusitis:  Wash your hands often with soap and water  Wash your hands after you use the bathroom, change a child's diaper, or sneeze  Wash your hands before you prepare or eat food  Follow up with your healthcare provider as directed:  Write down your questions so you remember to ask them during your visits  CARE AGREEMENT:   You have the right to help plan your care  Learn about your health condition and how it may be treated  Discuss treatment options with your caregivers to decide what care you want to receive  You always have the right to refuse treatment  The above information is an  only  It is not intended as medical advice for individual conditions or treatments  Talk to your doctor, nurse or pharmacist before following any medical regimen to see if it is safe and effective for you  © 2014 [address] is for End User's use only and may not be sold, redistributed or otherwise used for commercial purposes  All illustrations and images included in CareNotes® are the copyrighted property of [employer]  or [employer]

## 2018-02-22 NOTE — PROGRESS NOTES
Assessment/Plan: Brian Boone is a 20 y o  female with:   Visit Diagnoses     Acute non-recurrent pansinusitis    -  Primary- 5 days of URI symptoms, afebrile today  Does have sinus tenderness on exam  Will treat symptomatically with nasal saline spray, Naproxen 500mg BID PRN, Mucinex DM BID and will give [facility] for Augmentin if symptoms dont improve over the weekend  Relevant Medications    naproxen (NAPROSYN) 500 mg tablet    sodium chloride (OCEAN) 0 65 % nasal spray    amoxicillin-clavulanate (AUGMENTIN) 875-125 mg per tablet    Elevated blood pressure reading    - elevated BP mildly at 138/90, likely secondary to decongestants and acute illness, will have pt check her BP in 1 month at a pharmacy  Subjective:   Brian Boone is a 20 y o  female who presents today with a chief complaint of Nasal Congestion (started monday)    Woke up 4 days ago feeling sinus pressure, congestion, PND causing sore throat  Slowly improved, but then worse today  Her cough is more from the drip, does think its more reflexive  Flu shot: Yes  Non-smoker  Sick contacts: No      URI    This is a new problem  The current episode started in the past 7 days  The problem has been gradually worsening  There has been no fever  Associated symptoms include congestion, coughing, headaches, rhinorrhea and sinus pain  Pertinent negatives include no abdominal pain, chest pain, diarrhea, ear pain, nausea, sore throat, vomiting or wheezing  She has tried decongestant and NSAIDs (dayquil and nyquil) for the symptoms  The treatment provided mild relief  Review of Systems   Constitutional: Negative for chills and fever  HENT: Positive for congestion, postnasal drip, rhinorrhea, sinus pain and sinus pressure  Negative for ear pain and sore throat  Respiratory: Positive for cough and chest tightness  Negative for shortness of breath and wheezing  Cardiovascular: Negative for chest pain and palpitations  Gastrointestinal: Negative for abdominal pain, diarrhea, nausea and vomiting  Neurological: Positive for headaches  I have reviewed the patient's PMH, Social History, Medication List and Allergies  Objective:  /90 (BP Location: Left arm, Patient Position: Sitting)   Pulse 82   Temp 99 4 °F (37 4 °C) (Tympanic)   Resp 16   Ht 5' 2" (1 575 m)   Wt 62 kg (136 lb 9 6 oz)   BMI 24 98 kg/m²   Physical Exam   Constitutional: She appears well-developed and well-nourished  No distress  HENT:   Head: Normocephalic and atraumatic  Nose: Right sinus exhibits maxillary sinus tenderness and frontal sinus tenderness  Left sinus exhibits maxillary sinus tenderness and frontal sinus tenderness  Mouth/Throat: Oropharynx is clear and moist  No oropharyngeal exudate  Eyes: Conjunctivae are normal  Pupils are equal, round, and reactive to light  Right eye exhibits no discharge  Left eye exhibits no discharge  Neck: Normal range of motion  Neck supple  Cardiovascular: Normal rate and regular rhythm  Pulmonary/Chest: Effort normal and breath sounds normal  No respiratory distress  She has no wheezes  Lymphadenopathy:     She has no cervical adenopathy  Skin: She is not diaphoretic  Vitals reviewed  No future appointments

## 2018-02-22 NOTE — PROGRESS NOTES
Assessment/Plan:    No problem-specific Assessment & Plan notes found for this encounter  Diagnoses and all orders for this visit:    Vitamin D deficiency    Other orders  -     ergocalciferol (VITAMIN D2) 50,000 units; Take 1 capsule (50,000 Units total) by mouth          Subjective:      Patient ID: Carmelina Thomason is a 20 y o  female  HPI    The following portions of the patient's history were reviewed and updated as appropriate: She  has a past medical history of Anemia; Anxiety; Asthma; Depression; and Vitamin D deficiency       Review of Systems      Objective: There were no vitals taken for this visit           Physical Exam

## 2018-03-16 DIAGNOSIS — F41.1 GAD (GENERALIZED ANXIETY DISORDER): Primary | ICD-10-CM

## 2018-03-16 RX ORDER — CITALOPRAM 10 MG/1
TABLET ORAL
Qty: 30 TABLET | Refills: 1 | OUTPATIENT
Start: 2018-03-16

## 2018-03-16 RX ORDER — CITALOPRAM 10 MG/1
10 TABLET ORAL DAILY
Qty: 30 TABLET | Refills: 1 | Status: SHIPPED | OUTPATIENT
Start: 2018-03-16 | End: 2018-03-26 | Stop reason: SDUPTHER

## 2018-03-16 NOTE — TELEPHONE ENCOUNTER
She has not been seen for anxiety/depression for years, she saw Dr Petra Russell for this, please have her schedule an appt with him and send this kam him for refill   Thanks

## 2018-03-22 ENCOUNTER — TELEPHONE (OUTPATIENT)
Dept: FAMILY MEDICINE CLINIC | Facility: CLINIC | Age: 23
End: 2018-03-22

## 2018-03-22 DIAGNOSIS — F41.1 GAD (GENERALIZED ANXIETY DISORDER): ICD-10-CM

## 2018-03-22 DIAGNOSIS — T75.3XXA MOTION SICKNESS, INITIAL ENCOUNTER: Primary | ICD-10-CM

## 2018-03-22 RX ORDER — SCOLOPAMINE TRANSDERMAL SYSTEM 1 MG/1
1 PATCH, EXTENDED RELEASE TRANSDERMAL
Qty: 5 PATCH | Refills: 0 | Status: SHIPPED | OUTPATIENT
Start: 2018-03-22 | End: 2018-11-08 | Stop reason: ALTCHOICE

## 2018-03-22 RX ORDER — CITALOPRAM 10 MG/1
10 TABLET ORAL DAILY
Qty: 30 TABLET | Refills: 0 | Status: CANCELLED | OUTPATIENT
Start: 2018-03-22 | End: 2018-04-21

## 2018-03-22 NOTE — TELEPHONE ENCOUNTER
PT ALSO REQUESTING A PRESCRIPTION FOR MOTION SICKNESS PATCHES   SHE WILL BE TRAVELLING AND GETS MOTION SICKNESS PRETTY BAD

## 2018-03-26 ENCOUNTER — TELEPHONE (OUTPATIENT)
Dept: FAMILY MEDICINE CLINIC | Facility: CLINIC | Age: 23
End: 2018-03-26

## 2018-03-26 DIAGNOSIS — F41.1 GAD (GENERALIZED ANXIETY DISORDER): ICD-10-CM

## 2018-03-26 RX ORDER — CITALOPRAM 10 MG/1
10 TABLET ORAL DAILY
Qty: 90 TABLET | Refills: 0 | Status: SHIPPED | OUTPATIENT
Start: 2018-03-26 | End: 2018-06-01 | Stop reason: SDUPTHER

## 2018-03-27 ENCOUNTER — OFFICE VISIT (OUTPATIENT)
Dept: OBGYN CLINIC | Facility: CLINIC | Age: 23
End: 2018-03-27
Payer: COMMERCIAL

## 2018-03-27 VITALS — BODY MASS INDEX: 26.52 KG/M2 | SYSTOLIC BLOOD PRESSURE: 118 MMHG | DIASTOLIC BLOOD PRESSURE: 70 MMHG | WEIGHT: 145 LBS

## 2018-03-27 DIAGNOSIS — Z31.69 ENCOUNTER FOR PRECONCEPTION CONSULTATION: Primary | ICD-10-CM

## 2018-03-27 PROBLEM — K29.50 CHRONIC GASTRITIS WITHOUT BLEEDING: Status: ACTIVE | Noted: 2017-06-22

## 2018-03-27 PROCEDURE — 99214 OFFICE O/P EST MOD 30 MIN: CPT | Performed by: OBSTETRICS & GYNECOLOGY

## 2018-03-27 NOTE — PROGRESS NOTES
Assessment Geetha was seen today for family planning  Diagnoses and all orders for this visit:    Encounter for preconception consultation         Plan  Recommend timing of intercourse  Folic acid at least 747 mcg daily  Call office with positive home pregnancy test   Consider utilizing an ovulation predictor Apgar to help determine fertile window  May take 2 to 3 cycles for your periods to regulate after coming off the birth control pill  If cycles remain irregular after 2 to 3 months, please contact the office for evaluation  Mary Alice Del Cid is a 20 y o  female here for a discussion regarding fertility  Patient finished her pill pack about a week and a half ago and did not start her next pack  She has a trip planned in May and would like to start trying to conceive after that trip  She is using condoms in the meantime  Patient is taking a multivitamin  We discussed folic acid, if not included in her daily vitamin I would supplement with 925 mcg of folic acid daily  Discussed that it may take 2 to 3 months for her cycles to regulate however is likely that they will be normal right away  We discussed that she may ovulate as soon as 2 weeks after stopping the birth control pill  As long as her periods are regular after 3 months, I would continue trying to conceive for 1 year  We discussed timing of intercourse and ovulation predictor apps  We discussed that when she has a positive home pregnancy test, she should contact the office and she will be seen for an OB intake around 6 to 8 weeks with an ultrasound between 8 and 10 weeks  She will have her 1st prenatal visit with a physician around 12 weeks  We discussed her current medications  The Scopolamine patch, which she is going to start trying to use for motion sickness, is category C, therefore would not recommended in early pregnancy    She does use dramamine on occasion which is category B and therefore safe to use in pregnancy  Patient will be in the office soon for her annual exam     Patient Active Problem List   Diagnosis    Vitamin D deficiency    Anxiety    Chronic gastritis without bleeding    Exercise-induced asthma         Gynecologic History  No LMP recorded  Contraception: condoms  Last Pap: 2017  Results were: normal      Obstetric History  OB History    Para Term  AB Living   0 0 0 0 0 0   SAB TAB Ectopic Multiple Live Births   0 0 0 0 0             Past Medical/Surgical/Family/Social History  The following portions of the patient's history were reviewed and updated as appropriate: allergies, current medications, past family history, past medical history, past social history, past surgical history and problem list     Allergies  Patient has no known allergies  Medications    Current Outpatient Prescriptions:     albuterol (PROVENTIL HFA,VENTOLIN HFA) 90 mcg/act inhaler, Inhale 2 puffs every 6 (six) hours as needed for wheezing, Disp: , Rfl:     ALPRAZolam (XANAX) 0 25 mg tablet, Take by mouth, Disp: , Rfl:     citalopram (CeleXA) 10 mg tablet, Take 1 tablet (10 mg total) by mouth daily for 90 days, Disp: 90 tablet, Rfl: 0    naproxen (NAPROSYN) 500 mg tablet, 1 tab PO q12 hours PRN Pain, Disp: 20 tablet, Rfl: 0    scopolamine (TRANSDERM-SCOP) 1 5 mg/3 days TD 72 hr patch, Place 1 patch on the skin every third day, Disp: 5 patch, Rfl: 0      Review of Systems  Constitutional: no fever, feels well, no tiredness, no recent weight gain or loss  Breasts:no complaints of breast pain, breast lump, or nipple discharge  Gastrointestinal: no complaints of abdominal pain, constipation, nausea, vomiting, or diarrhea or bloody stools  Genitourinary : no complaints of dysuria, incontinence, pelvic pain, dysmenorrhea,vaginal discharge or abnormal vaginal bleeding       Objective     /70   Wt 65 8 kg (145 lb)   Breastfeeding?  No   BMI 26 52 kg/m²     General:   alert and oriented, in no acute distress

## 2018-04-09 ENCOUNTER — ANNUAL EXAM (OUTPATIENT)
Dept: OBGYN CLINIC | Facility: CLINIC | Age: 23
End: 2018-04-09
Payer: COMMERCIAL

## 2018-04-09 VITALS
DIASTOLIC BLOOD PRESSURE: 80 MMHG | HEIGHT: 63 IN | SYSTOLIC BLOOD PRESSURE: 120 MMHG | BODY MASS INDEX: 24.77 KG/M2 | WEIGHT: 139.8 LBS

## 2018-04-09 DIAGNOSIS — Z01.419 WOMEN'S ANNUAL ROUTINE GYNECOLOGICAL EXAMINATION: Primary | ICD-10-CM

## 2018-04-09 PROCEDURE — 99395 PREV VISIT EST AGE 18-39: CPT | Performed by: OBSTETRICS & GYNECOLOGY

## 2018-04-09 NOTE — PROGRESS NOTES
ASSESSMENT & PLAN: Sussy Ttius is a 20 y o  Lorenza Axel with normal gynecologic exam     1   Routine well woman exam done today  2    Pap and HPV:Pap with reflex HPV was not done today  Current ASCCP Guidelines reviewed  Last Pap  2017 :  no abnormalities  3   The patient declined STD testing  No testing performed  Safe sex practices have been discussed  4  The patient is sexually active  She declined contraception  5  The following were reviewed in today's visit: breast self exam, family planning choices, exercise, healthy diet and folic acid supplementation  6  Patient to return to office in 12 months for annual exam, sooner with positive HPT  7  Discussed fertility planning, timing of intercourse, folic acid supplementation  All questions have been answered to her satisfaction  CC:  Annual Gynecologic Examination    HPI: Sussy Titus is a 20 y o  Lorenza Axel who presents for annual gynecologic examination  She has the following concerns:  Wants to start trying to get pregnant in next month or so  Stopped OCPs last month  Discussed folic acid supplementation, timing of intercourse, trying for 1 year if cycles remain regular, may take 3 months for cycles to regulate  Call office with positive HPT, first visit between 6-8 weeks with RN for OB intake, US between 8-10 weeks and 1st OB visit around 12 weeks with physician  Having some cramping today, may be getting period soon  Health Maintenance:    She exercises 0 days per week  She wears her seatbelt routinely  She does not perform regular monthly self breast exams  She feels safe at home  Patients does follow an average healthy diet        Past Medical History:   Diagnosis Date    Anemia     Anxiety     Asthma     Depression     Vitamin D deficiency        Past Surgical History:   Procedure Laterality Date    UT COLONOSCOPY FLX DX W/COLLJ SPEC WHEN PFRMD N/A 2/21/2017    Procedure: EGD AND COLONOSCOPY;  Surgeon: Marily Remy Marycarmen Palacio MD;  [facility]; Service: Gastroenterology    SKIN GRAFT Left     WISDOM TOOTH EXTRACTION         Past OB/Gyn History:  Period Cycle (Days): 28  Period Pattern: RegularPatient's last menstrual period was 2018 (exact date)  Menstrual History:  OB History      Para Term  AB Living    0 0 0 0 0 0    SAB TAB Ectopic Multiple Live Births    0 0 0 0 0           Patient's last menstrual period was 2018 (exact date)  Period Cycle (Days): 28  Period Pattern: Regular    History of sexually transmitted infection No  Patient is currently sexually active  heterosexual Birth control: none  Last Pap  2017  :  no abnormalities  Family History   Problem Relation Age of Onset    Leukemia Mother     Hypertension Father        Social History:  Social History     Social History    Marital status: /Civil Union     Spouse name: N/A    Number of children: N/A    Years of education: N/A     Occupational History    Not on file  Social History Main Topics    Smoking status: Never Smoker    Smokeless tobacco: Never Used    Alcohol use 1 8 oz/week     1 Glasses of wine, 1 Cans of beer, 1 Shots of liquor per week    Drug use: No    Sexual activity: Yes     Partners: Male     Birth control/ protection: None     Other Topics Concern    Not on file     Social History Narrative    No narrative on file     Presently lives with   Patient is   Patient is currently employed       No Known Allergies    Current Outpatient Prescriptions:     albuterol (PROVENTIL HFA,VENTOLIN HFA) 90 mcg/act inhaler, Inhale 2 puffs every 6 (six) hours as needed for wheezing, Disp: , Rfl:     ALPRAZolam (XANAX) 0 25 mg tablet, Take by mouth, Disp: , Rfl:     citalopram (CeleXA) 10 mg tablet, Take 1 tablet (10 mg total) by mouth daily for 90 days, Disp: 90 tablet, Rfl: 0    naproxen (NAPROSYN) 500 mg tablet, 1 tab PO q12 hours PRN Pain, Disp: 20 tablet, Rfl: 0    scopolamine (TRANSDERM-SCOP) 1 5 mg/3 days TD 72 hr patch, Place 1 patch on the skin every third day, Disp: 5 patch, Rfl: 0    Review of Systems:  A complete review of systems was performed and was negative, except as listed  Denies weight changes, excessive fatigue, urinary incontinence, urinary frequency, bowel changes, blood in stool, severe headaches, vaginal bleeding, vaginal discharge, no breast changes  Dealing with bowel issues - IBS  Physical Exam:  /80   Ht 5' 2 5" (1 588 m)   Wt 63 4 kg (139 lb 12 8 oz)   LMP 03/18/2018 (Exact Date)   Breastfeeding? No   BMI 25 16 kg/m²    GEN: The patient was alert and oriented x3, pleasant well-appearing female in no acute distress  HEENT:  Unremarkable, no anterior or posterior lymphadenopathy, no thyromegaly  CV:  RRR, no murmurs  RESP:  Clear to auscultation bilaterally  BREAST:  Symmetric breasts with no palpable breast masses or obvious breast lesions  She has no retractions or nipple discharge  She has no axillary abnormalities or palpable masses  Self breast exam is taught  ABD:  Soft, nontender, non-distended  EXT: nontender, no edema  PELVIC:  Normal appearing external female genitalia, normal vaginal epithelium, No discharge  Cervix present   Bimanual: absent CMT,  normal uterus, non-tender  No palpable adnexal masses  Pap was not collected

## 2018-04-24 ENCOUNTER — TELEPHONE (OUTPATIENT)
Dept: FAMILY MEDICINE CLINIC | Facility: CLINIC | Age: 23
End: 2018-04-24

## 2018-04-25 ENCOUNTER — APPOINTMENT (OUTPATIENT)
Dept: LAB | Age: 23
End: 2018-04-25
Payer: COMMERCIAL

## 2018-04-25 DIAGNOSIS — E55.9 VITAMIN D DEFICIENCY: ICD-10-CM

## 2018-04-25 DIAGNOSIS — D50.9 IRON DEFICIENCY ANEMIA, UNSPECIFIED IRON DEFICIENCY ANEMIA TYPE: Primary | ICD-10-CM

## 2018-04-25 DIAGNOSIS — D50.9 IRON DEFICIENCY ANEMIA, UNSPECIFIED IRON DEFICIENCY ANEMIA TYPE: ICD-10-CM

## 2018-04-25 LAB
25(OH)D3 SERPL-MCNC: 14.7 NG/ML (ref 30–100)
ALBUMIN SERPL BCP-MCNC: 3.8 G/DL (ref 3.5–5)
ALP SERPL-CCNC: 64 U/L (ref 46–116)
ALT SERPL W P-5'-P-CCNC: 83 U/L (ref 12–78)
ANION GAP SERPL CALCULATED.3IONS-SCNC: 5 MMOL/L (ref 4–13)
AST SERPL W P-5'-P-CCNC: 79 U/L (ref 5–45)
BASOPHILS # BLD AUTO: 0.01 THOUSANDS/ΜL (ref 0–0.1)
BASOPHILS NFR BLD AUTO: 0 % (ref 0–1)
BILIRUB SERPL-MCNC: 0.27 MG/DL (ref 0.2–1)
BUN SERPL-MCNC: 10 MG/DL (ref 5–25)
CALCIUM SERPL-MCNC: 9.2 MG/DL (ref 8.3–10.1)
CHLORIDE SERPL-SCNC: 106 MMOL/L (ref 100–108)
CO2 SERPL-SCNC: 28 MMOL/L (ref 21–32)
CREAT SERPL-MCNC: 0.76 MG/DL (ref 0.6–1.3)
EOSINOPHIL # BLD AUTO: 0.08 THOUSAND/ΜL (ref 0–0.61)
EOSINOPHIL NFR BLD AUTO: 2 % (ref 0–6)
ERYTHROCYTE [DISTWIDTH] IN BLOOD BY AUTOMATED COUNT: 13.4 % (ref 11.6–15.1)
GFR SERPL CREATININE-BSD FRML MDRD: 109 ML/MIN/1.73SQ M
GLUCOSE SERPL-MCNC: 81 MG/DL (ref 65–140)
HCT VFR BLD AUTO: 31.4 % (ref 34.8–46.1)
HGB BLD-MCNC: 10.1 G/DL (ref 11.5–15.4)
IRON SERPL-MCNC: 30 UG/DL (ref 50–170)
LYMPHOCYTES # BLD AUTO: 2.08 THOUSANDS/ΜL (ref 0.6–4.47)
LYMPHOCYTES NFR BLD AUTO: 53 % (ref 14–44)
MCH RBC QN AUTO: 26.4 PG (ref 26.8–34.3)
MCHC RBC AUTO-ENTMCNC: 32.2 G/DL (ref 31.4–37.4)
MCV RBC AUTO: 82 FL (ref 82–98)
MONOCYTES # BLD AUTO: 0.38 THOUSAND/ΜL (ref 0.17–1.22)
MONOCYTES NFR BLD AUTO: 10 % (ref 4–12)
NEUTROPHILS # BLD AUTO: 1.35 THOUSANDS/ΜL (ref 1.85–7.62)
NEUTS SEG NFR BLD AUTO: 35 % (ref 43–75)
NRBC BLD AUTO-RTO: 0 /100 WBCS
PLATELET # BLD AUTO: 273 THOUSANDS/UL (ref 149–390)
PMV BLD AUTO: 10.7 FL (ref 8.9–12.7)
POTASSIUM SERPL-SCNC: 3.7 MMOL/L (ref 3.5–5.3)
PROT SERPL-MCNC: 7.6 G/DL (ref 6.4–8.2)
RBC # BLD AUTO: 3.83 MILLION/UL (ref 3.81–5.12)
SODIUM SERPL-SCNC: 139 MMOL/L (ref 136–145)
WBC # BLD AUTO: 3.9 THOUSAND/UL (ref 4.31–10.16)

## 2018-04-25 PROCEDURE — 82306 VITAMIN D 25 HYDROXY: CPT

## 2018-04-25 PROCEDURE — 36415 COLL VENOUS BLD VENIPUNCTURE: CPT

## 2018-04-25 PROCEDURE — 83540 ASSAY OF IRON: CPT

## 2018-04-25 PROCEDURE — 80053 COMPREHEN METABOLIC PANEL: CPT

## 2018-04-25 PROCEDURE — 85025 COMPLETE CBC W/AUTO DIFF WBC: CPT

## 2018-05-02 ENCOUNTER — OFFICE VISIT (OUTPATIENT)
Dept: FAMILY MEDICINE CLINIC | Facility: CLINIC | Age: 23
End: 2018-05-02
Payer: COMMERCIAL

## 2018-05-02 VITALS
WEIGHT: 138 LBS | BODY MASS INDEX: 25.4 KG/M2 | HEIGHT: 62 IN | SYSTOLIC BLOOD PRESSURE: 102 MMHG | DIASTOLIC BLOOD PRESSURE: 64 MMHG | RESPIRATION RATE: 16 BRPM | TEMPERATURE: 98.3 F | HEART RATE: 76 BPM

## 2018-05-02 DIAGNOSIS — E55.9 VITAMIN D DEFICIENCY: ICD-10-CM

## 2018-05-02 DIAGNOSIS — D50.8 IRON DEFICIENCY ANEMIA SECONDARY TO INADEQUATE DIETARY IRON INTAKE: Primary | ICD-10-CM

## 2018-05-02 DIAGNOSIS — Z31.9 PATIENT DESIRES PREGNANCY: ICD-10-CM

## 2018-05-02 DIAGNOSIS — R53.82 CHRONIC FATIGUE: ICD-10-CM

## 2018-05-02 DIAGNOSIS — R79.89 ELEVATED LFTS: ICD-10-CM

## 2018-05-02 PROCEDURE — 99214 OFFICE O/P EST MOD 30 MIN: CPT | Performed by: FAMILY MEDICINE

## 2018-05-02 RX ORDER — ERGOCALCIFEROL 1.25 MG/1
50000 CAPSULE ORAL WEEKLY
Qty: 12 CAPSULE | Refills: 0 | Status: SHIPPED | OUTPATIENT
Start: 2018-05-02 | End: 2018-11-08 | Stop reason: ALTCHOICE

## 2018-05-02 RX ORDER — FERROUS SULFATE TAB EC 324 MG (65 MG FE EQUIVALENT) 324 (65 FE) MG
324 TABLET DELAYED RESPONSE ORAL
Qty: 60 TABLET | Refills: 2 | Status: SHIPPED | OUTPATIENT
Start: 2018-05-02 | End: 2018-11-08 | Stop reason: ALTCHOICE

## 2018-05-02 NOTE — ASSESSMENT & PLAN NOTE
Unclear etiology of elevated LFTs, on occasion patient will have some mild abdominal pain, AST was 79 and ALT was 83  Patient did have a normal CMP over year ago so this is the 1st time it was elevated  Before doing a further workup, will recheck with next set of blood work in 1 month and return to clinic at that time to discuss results

## 2018-05-02 NOTE — ASSESSMENT & PLAN NOTE
Patient has a history of anemia, last check last year was 11 6, hemoglobin done last week was 10 1 with MCV of 82 but iron was low at 30  Likely has iron deficiency anemia given her diet and irregular periods  Discussed at length the high iron diet, given patient handout on increased on iron  Given that she is already on a prenatal vitamin and trying to get pregnant, we will have her do ferrous sulfate 325 milligrams twice a day with vitamin- C  Will recheck a CBC in 1 month and return to clinic to re-evaluate at that time

## 2018-05-02 NOTE — ASSESSMENT & PLAN NOTE
Likely multifactorial secondary to iron deficiency anemia and vitamin-D deficiency as detailed above  Patient has a history of anxiety and is on Celexa, PHQ-9 was 0 today so was well controlled  Given her associated symptoms of cold intolerance and constipation, we will also check a TSH and treat accordingly

## 2018-05-02 NOTE — PROGRESS NOTES
FAMILY MEDICINE PROGRESS NOTE  Geetha CATES [facility] 20 y o  female   DATE: May 2, 2018     ASSESSMENT and PLAN:  Tess Boston is a 20 y o  female with:     Iron deficiency anemia secondary to inadequate dietary iron intake  Patient has a history of anemia, last check last year was 11 6, hemoglobin done last week was 10 1 with MCV of 82 but iron was low at 30  Likely has iron deficiency anemia given her diet and irregular periods  Discussed at length the high iron diet, given patient handout on increased on iron  Given that she is already on a prenatal vitamin and trying to get pregnant, we will have her do ferrous sulfate 325 milligrams twice a day with vitamin- C  Will recheck a CBC in 1 month and return to clinic to re-evaluate at that time  Vitamin D deficiency    Patient has a history of vitamin-D deficiency, but most recent check was lower than her usual at 14 7  Will do weekly supplement 11684 units for 2 months then do daily supplement  Patient advised on vitamin D rich foods and given handout  Elevated LFTs    Unclear etiology of elevated LFTs, on occasion patient will have some mild abdominal pain, AST was 79 and ALT was 83  Patient did have a normal CMP over year ago so this is the 1st time it was elevated  Before doing a further workup, will recheck with next set of blood work in 1 month and return to clinic at that time to discuss results  Chronic fatigue    Likely multifactorial secondary to iron deficiency anemia and vitamin-D deficiency as detailed above  Patient has a history of anxiety and is on Celexa, PHQ-9 was 0 today so was well controlled  Given her associated symptoms of cold intolerance and constipation, we will also check a TSH and treat accordingly  Patient desires pregnancy    Patient and her  are trying to conceive  Advised her that she should not take any anti-inflammatories at this time, will discontinue naproxen    Patient was enquiring about alcohol use, I advised against it while she is trying to get pregnant  Advise continue daily Prenatal vitamin intake  SUBJECTIVE:  Woodrow Guthrie is a 20 y o  female who presents today with a chief complaint of Follow-up (blood work)  Pt had called to request bloodwork because she was feeling fatigued, couldn't wake up in the morning  She thought she was anemic  She has been sleeping well  She is off the birth control pill because her  and her are trying to conceive  Has since had 2 cycles and has had 3 days of a heavy period and has slightly irregular periods  Has a history of anemia in the past was >5 years ago  Review of Systems   Constitutional: Positive for chills and fatigue  Negative for fever  Gastrointestinal: Positive for abdominal pain and constipation  Negative for blood in stool and nausea  Endocrine: Positive for cold intolerance  Neurological: Positive for light-headedness  Negative for dizziness  I have reviewed the patient's PMH, Social History, Medication List and Allergies  OBJECTIVE:  /64   Pulse 76   Temp 98 3 °F (36 8 °C)   Resp 16   Ht 5' 2" (1 575 m)   Wt 62 6 kg (138 lb)   LMP 04/21/2018 (Exact Date)   Breastfeeding? No   BMI 25 24 kg/m²   Physical Exam   Constitutional: She appears well-developed and well-nourished  No distress  HENT:   Head: Normocephalic and atraumatic  Cardiovascular: Normal rate, regular rhythm and normal heart sounds  No murmur heard  Pulmonary/Chest: Effort normal and breath sounds normal  No respiratory distress  She has no wheezes  Abdominal: Soft  Normal appearance and bowel sounds are normal  There is no tenderness  Neurological: She is alert  Skin: She is not diaphoretic  Vitals reviewed          PHQ-9 Depression Screening    PHQ-9:    Frequency of the following problems over the past two weeks:       Little interest or pleasure in doing things:  0 - not at all  Feeling down, depressed, or hopeless:  0 - not at all  PHQ-2 Score:  0       Appointment on 04/25/2018   Component Date Value Ref Range Status    WBC 04/25/2018 3 90* 4 31 - 10 16 Thousand/uL Final    RBC 04/25/2018 3 83  3 81 - 5 12 Million/uL Final    Hemoglobin 04/25/2018 10 1* 11 5 - 15 4 g/dL Final    Hematocrit 04/25/2018 31 4* 34 8 - 46 1 % Final    MCV 04/25/2018 82  82 - 98 fL Final    MCH 04/25/2018 26 4* 26 8 - 34 3 pg Final    MCHC 04/25/2018 32 2  31 4 - 37 4 g/dL Final    RDW 04/25/2018 13 4  11 6 - 15 1 % Final    MPV 04/25/2018 10 7  8 9 - 12 7 fL Final    Platelets 10/50/3591 273  149 - 390 Thousands/uL Final    nRBC 04/25/2018 0  /100 WBCs Final    Neutrophils Relative 04/25/2018 35* 43 - 75 % Final    Lymphocytes Relative 04/25/2018 53* 14 - 44 % Final    Monocytes Relative 04/25/2018 10  4 - 12 % Final    Eosinophils Relative 04/25/2018 2  0 - 6 % Final    Basophils Relative 04/25/2018 0  0 - 1 % Final    Neutrophils Absolute 04/25/2018 1 35* 1 85 - 7 62 Thousands/µL Final    Lymphocytes Absolute 04/25/2018 2 08  0 60 - 4 47 Thousands/µL Final    Monocytes Absolute 04/25/2018 0 38  0 17 - 1 22 Thousand/µL Final    Eosinophils Absolute 04/25/2018 0 08  0 00 - 0 61 Thousand/µL Final    Basophils Absolute 04/25/2018 0 01  0 00 - 0 10 Thousands/µL Final    Sodium 04/25/2018 139  136 - 145 mmol/L Final    Potassium 04/25/2018 3 7  3 5 - 5 3 mmol/L Final    Chloride 04/25/2018 106  100 - 108 mmol/L Final    CO2 04/25/2018 28  21 - 32 mmol/L Final    Anion Gap 04/25/2018 5  4 - 13 mmol/L Final    BUN 04/25/2018 10  5 - 25 mg/dL Final    Creatinine 04/25/2018 0 76  0 60 - 1 30 mg/dL Final    Standardized to IDMS reference method    Glucose 04/25/2018 81  65 - 140 mg/dL Final      If the patient is fasting, the ADA then defines impaired fasting glucose as > 100 mg/dL and diabetes as > or equal to 123 mg/dL    Specimen collection should occur prior to Sulfasalazine administration due to the potential for falsely depressed results  Specimen collection should occur prior to Sulfapyridine administration due to the potential for falsely elevated results   Calcium 04/25/2018 9 2  8 3 - 10 1 mg/dL Final    AST 04/25/2018 79* 5 - 45 U/L Final      Specimen collection should occur prior to Sulfasalazine administration due to the potential for falsely depressed results   ALT 04/25/2018 83* 12 - 78 U/L Final      Specimen collection should occur prior to Sulfasalazine and/or Sulfapyridine administration due to the potential for falsely depressed results   Alkaline Phosphatase 04/25/2018 64  46 - 116 U/L Final    Total Protein 04/25/2018 7 6  6 4 - 8 2 g/dL Final    Albumin 04/25/2018 3 8  3 5 - 5 0 g/dL Final    Total Bilirubin 04/25/2018 0 27  0 20 - 1 00 mg/dL Final    eGFR 04/25/2018 109  ml/min/1 73sq m Final    Iron 04/25/2018 30* 50 - 170 ug/dL Final      Patients treated with metal-binding drugs (ie  Deferoxamine) may have depressed iron values   Vit D, 25-Hydroxy 04/25/2018 14 7* 30 0 - 100 0 ng/mL Final       Dorys Adkins MD

## 2018-05-02 NOTE — PATIENT INSTRUCTIONS
Iron Deficiency Anemia   AMBULATORY CARE:   Iron deficiency anemia  (SHWETHA) is low levels of red blood cells and hemoglobin caused by a lack of iron in the blood  Iron helps make hemoglobin  Hemoglobin is part of your red blood cell and helps carry oxygen to your body  The most common causes are blood loss and not enough iron in the foods you eat  Common symptoms include the following: Iron Rich Diet   WHAT YOU NEED TO KNOW:   What is an iron-rich diet? An iron-rich diet includes foods that are good sources of iron  People need extra iron during childhood, adolescence (teenage years), and pregnancy  Iron is a mineral that your body needs to make hemoglobin  Hemoglobin is part of your blood and helps carry oxygen from your lungs to the rest of your body  You may need to eat more iron-rich foods to treat or prevent a low blood iron level or iron deficiency anemia  How much iron do I need each day? · Males:      ¨ 3to 1years old: 7 mg    ¨ 3to 6years old: 10 mg    ¨ 5to 15years old: 8 mg    ¨ 15to 25years old: 11 mg    ¨ 19 years and older: 8 mg    · Females:      ¨ 3to 1years old: 7 mg    ¨ 3to 6years old: 10 mg    ¨ 5to 15years old: 8 mg    ¨ 15to 25years old: 15 mg    ¨ 19 to 50 years: 18 mg    ¨ Over 46years old: 8 mg    ¨ Pregnant women:  27 mg  Which foods contain iron? · Meat, fish, and poultry are good sources of iron  They contain heme iron, a form of iron that your body absorbs very well  Fruit, vegetables, eggs, and grains such as pasta, rice, and cereal also contain iron  They contain nonheme iron, a form of iron that is not absorbed as well as heme iron  You can absorb more iron from these foods by eating a food that is high in vitamin C at the same time  You can also absorb more nonheme iron by eating a food from the meat, fish, and poultry group at the same time  · Fish and shellfish contain some mercury, a metal that can be harmful   Children and unborn babies are at higher risk for harm caused by mercury  Children and pregnant women should avoid eating fish high in mercury, such as shark and swordfish  They should also eat only fish that are lower in mercury, such as salmon, canned light tuna, and catfish  Limit the amount of low-mercury fish and shellfish you eat to less than 12 ounces per week  What are some iron-rich foods? · Foods that contain 2 mg or more per serving:      ¨ 3 ounces of cooked beef (wander, eye of round) or cooked turkey (dark meat)    ¨ ½ cup of beans (black, kidney, or lentil, or soybeans)    ¨ ½ cup of tofu    ¨ 1 medium baked potato    ¨ 1 cup of cooked artichoke or cooked spinach    ¨ ¾ cup of instant oatmeal    ¨ 1 cup of corn flakes    · Foods that contain 1 to 2 mg per serving:      ¨ 3 ounces of chicken    ¨ 3 ounces of pork    ¨ 3 ounces of turkey (light meat)    ¨ 3 ounces of light tuna    ¨ ½ cup of seedless, packed raisins    ¨ 1 slice of whole-wheat or white bread  What are good sources of vitamin C? Eat a serving of vitamin C with any iron-rich food to help your body absorb more iron  The following fruits and vegetables are good sources of vitamin C:  · 1 cup of fresh orange juice (124 mg) or pink grapefruit juice (83 mg)    · 1 cup of strawberries (106 mg)    · 1 cup of diced cantaloupe (68 mg)     · 1 cup of sweet yellow pepper (283 mg)    · 1 cup of fresh, boiled broccoli (116 mg) or cooked brussels sprouts (97 mg)    · 1 cup of kale (53 mg)    · 1 cup of tomato juice (45 mg)  What other guidelines should I follow? · Tea and coffee can decrease the amount of iron that your body absorbs from iron-rich foods  Drink coffee and tea separately from meals that contain iron-rich foods  · Children over the age of 1 year only need about 24 ounces of cow's milk each day  When children drink too much milk, they may eat fewer iron-rich foods  This may cause them to have a low level of iron in their blood    What are the risks of not following an iron-rich diet? If you do not include iron-rich foods and vitamin C in your diet every day, you may have low blood iron levels  This may lead to iron deficiency anemia, especially during periods when your body needs extra iron  Iron deficiency anemia may cause problems with your child's growth and development  If you have iron deficiency anemia, you may develop other health problems  CARE AGREEMENT:   You have the right to help plan your care  Discuss treatment options with your caregivers to decide what care you want to receive  You always have the right to refuse treatment  The above information is an  only  It is not intended as medical advice for individual conditions or treatments  Talk to your doctor, nurse or pharmacist before following any medical regimen to see if it is safe and effective for you  © 2017 [address] Information is for End User's use only and may not be sold, redistributed or otherwise used for commercial purposes  All illustrations and images included in CareNotes® are the copyrighted property of A D A M , Inc  or [employer]  · Weakness and tiredness    · Shortness of breath with activity    · Fast heartbeat or dizziness    · Headaches or trouble concentrating    · Pale skin    · Sore or swollen tongue and mouth    · Nails that break easily    · An urge to eat ice, paint, starch, or dirt  Call 911 for any of the following:   · You have shortness of breath, even when you rest     Seek care immediately if:   · You have dark or bloody bowel movements  · You are too dizzy to stand up  · You have trouble swallowing because of the pain in your mouth and throat  Contact your healthcare provider if:   · You have heartburn, constipation, or diarrhea  · You have nausea or are vomiting  · You are dizzy or very tired  · You have questions or concerns about your condition or care    Treatment for anemia  may include any of the following:  · Iron or folic acid supplements  will help increase your red blood cell and hemoglobin levels  · Bowel movement softeners  may be needed if the iron supplements cause constipation  Eat foods rich in iron and protein:  Nuts, meat, dark leafy green vegetables, and beans are high in iron and protein  Do not drink coffee, tea, or other liquids with caffeine  Limit milk to 2 cups a day  You may need to meet with a dietitian to create the right food plan for you  Drink liquids as directed:  Liquids help prevent constipation  Ask how much liquid to drink each day and which liquids are best for you  Follow up with your healthcare provider as directed:  Write down your questions so you remember to ask them during your visits  © 2017 [address] [facility] Information is for End User's use only and may not be sold, redistributed or otherwise used for commercial purposes  All illustrations and images included in CareNotes® are the copyrighted property of A D A M , Inc  or [employer]  The above information is an  only  It is not intended as medical advice for individual conditions or treatments  Talk to your doctor, nurse or pharmacist before following any medical regimen to see if it is safe and effective for you  Vitamin D Deficiency   AMBULATORY CARE:   Vitamin D deficiency  is a low level of vitamin D in your body  Vitamin D helps your body absorb calcium from foods  Your body makes vitamin D when your skin is exposed to sunlight  You can also get vitamin D from certain foods such as fish, eggs, and meat  Most of the vitamin D in your body comes from sunlight exposure  Common symptoms include the following:  Low levels of vitamin D can lead to weak and brittle bones that are more likely to fracture   You may not have any signs and symptoms, or you may have any of the following:  · Bone pain or discomfort in your lower back, pelvis, or legs    · Muscle aches and weakness    · Low back pain in women    · Poor growth, irritability, and frequent respiratory tract infections in infants    · Deformed bones and slow growth in children  Contact your healthcare provider for the following symptoms:   · Continued or worsening symptoms    · Nausea, vomiting, or a headache after possibly taking too much of a vitamin D supplement    · Questions or concerns about your condition or care  Treatment for vitamin D deficiency  includes high doses of vitamin D for 8 to 12 weeks to increase your levels  Your levels will then be rechecked  If your levels are still low, you will need to take vitamin D supplements for another 8 weeks  After your levels have gone back to normal, you may need to continue to take a vitamin D supplement  Amount of vitamin D do you need each day:  The amount of vitamin D you need depends on your age  You may need more than the recommended amounts below if you take certain medicines or you are obese  Ask your healthcare provider how much vitamin D you need  · Infants up to 1 year of age: 0 international units (IU)    · Children 1 year and older: 600 IU    · Adults aged 23to 79years old: 600 IU    · Adults older than 70 years: 800 IU  Prevent vitamin D deficiency:   · Eat foods that are high in vitamin D  Fatty fish such as mackerel, canned tuna and sardines, and salmon are good sources of vitamin D  Certain foods such as milk, juice, and cereal are fortified with vitamin D      · Give your  infant a vitamin D supplement  of 400 IU each day  · Take vitamin D supplements as directed  High doses of vitamin D can be toxic  Your healthcare provider will tell you how much vitamin D you should take each day  Vitamin D is best absorbed when taken with food  · Expose your skin to sunlight as directed  Ask your healthcare provider how you can safely expose your skin to sunlight and for how long  Too much exposure to sunlight can cause skin cancer    Follow up with your healthcare provider as directed:  Write down your questions so you remember to ask them during your visits  © 2017 [address] Information is for End User's use only and may not be sold, redistributed or otherwise used for commercial purposes  All illustrations and images included in CareNotes® are the copyrighted property of A D A M , Inc  or [employer]  The above information is an  only  It is not intended as medical advice for individual conditions or treatments  Talk to your doctor, nurse or pharmacist before following any medical regimen to see if it is safe and effective for you

## 2018-05-02 NOTE — ASSESSMENT & PLAN NOTE
Patient and her  are trying to conceive  Advised her that she should not take any anti-inflammatories at this time, will discontinue naproxen  Patient was enquiring about alcohol use, I advised against it while she is trying to get pregnant  Advise continue daily Prenatal vitamin intake

## 2018-05-02 NOTE — ASSESSMENT & PLAN NOTE
Patient has a history of vitamin-D deficiency, but most recent check was lower than her usual at 14 7  Will do weekly supplement 82743 units for 2 months then do daily supplement  Patient advised on vitamin D rich foods and given handout

## 2018-05-10 ENCOUNTER — TELEPHONE (OUTPATIENT)
Dept: FAMILY MEDICINE CLINIC | Facility: CLINIC | Age: 23
End: 2018-05-10

## 2018-05-10 NOTE — TELEPHONE ENCOUNTER
Pt called she  mentioned she is leaving on vacation tomorrow  and was given medication for motion sickness  Scopolamine and  Dramamine  She mentioned she ask you this in her last apt with you but she forgot  She wants to know if taking both of them is ok and how should she take them   Also ask if they are both ok for pregnancy  [phone]

## 2018-05-11 NOTE — TELEPHONE ENCOUNTER
Patient called and received message   She would also like to know if she takes dramamine if she can also use her alprazolam

## 2018-05-11 NOTE — TELEPHONE ENCOUNTER
She can do both in pregnancy  She shouldn't do both at the same time  If she puts on the scopolamine, she should wait 12 hours before using the dramamine  And same vice versa

## 2018-05-11 NOTE — TELEPHONE ENCOUNTER
She should be careful because they can cause sedation and respiratory depression, so I would advise against it

## 2018-06-01 ENCOUNTER — TRANSCRIBE ORDERS (OUTPATIENT)
Dept: ADMINISTRATIVE | Age: 23
End: 2018-06-01

## 2018-06-01 DIAGNOSIS — F41.1 GAD (GENERALIZED ANXIETY DISORDER): ICD-10-CM

## 2018-06-01 DIAGNOSIS — M54.2 NECK PAIN: Primary | ICD-10-CM

## 2018-06-01 RX ORDER — CITALOPRAM 10 MG/1
10 TABLET ORAL DAILY
Qty: 90 TABLET | Refills: 1 | Status: SHIPPED | OUTPATIENT
Start: 2018-06-01 | End: 2018-08-13 | Stop reason: SDUPTHER

## 2018-06-06 ENCOUNTER — APPOINTMENT (OUTPATIENT)
Dept: RADIOLOGY | Age: 23
End: 2018-06-06
Payer: COMMERCIAL

## 2018-06-06 DIAGNOSIS — M54.2 NECK PAIN: ICD-10-CM

## 2018-06-06 PROCEDURE — 72040 X-RAY EXAM NECK SPINE 2-3 VW: CPT

## 2018-07-23 DIAGNOSIS — E55.9 VITAMIN D DEFICIENCY: ICD-10-CM

## 2018-07-23 RX ORDER — ERGOCALCIFEROL 1.25 MG/1
CAPSULE ORAL
Qty: 12 CAPSULE | Refills: 0 | OUTPATIENT
Start: 2018-07-23

## 2018-07-23 NOTE — TELEPHONE ENCOUNTER
Pt has finished therapy, now only has to do Vit D 1,000 units daily which is available over the counter

## 2018-08-03 ENCOUNTER — TELEPHONE (OUTPATIENT)
Dept: OBGYN CLINIC | Facility: CLINIC | Age: 23
End: 2018-08-03

## 2018-08-03 DIAGNOSIS — R10.2 PELVIC PAIN: ICD-10-CM

## 2018-08-03 DIAGNOSIS — N30.00 ACUTE CYSTITIS WITHOUT HEMATURIA: Primary | ICD-10-CM

## 2018-08-03 DIAGNOSIS — R39.9 UTI SYMPTOMS: Primary | ICD-10-CM

## 2018-08-03 RX ORDER — AMOXICILLIN 500 MG/1
500 TABLET, FILM COATED ORAL 2 TIMES DAILY
Qty: 14 TABLET | Refills: 0 | Status: SHIPPED | OUTPATIENT
Start: 2018-08-03 | End: 2018-08-10

## 2018-08-03 NOTE — TELEPHONE ENCOUNTER
Pt called c/o possible UTI with frequency, burning and urgency  Denies odor, discharge or itching  Also c/o still having pelvic pain  States she had a cyst in the past and requests ultrasound  Spoke with Dr Leon Ridley states she will send in Amoxicillin to pts pharmacy ([facility]) will mail pt an order to get urine culture done  Told pt to not start meds until urine culture is done at lab so that results are not effected  Can try AZO, cranberry supplements and stay hydrated in the mean time until she receives order in mail  Dr Leon Ridley also will order ultrasound to be scheduled for f/u cyst, and appt made for pt to f/u on results and pelvic pain for 8/20/18 @2:15pm  Pt verbalized understanding

## 2018-08-13 ENCOUNTER — TELEPHONE (OUTPATIENT)
Dept: FAMILY MEDICINE CLINIC | Facility: CLINIC | Age: 23
End: 2018-08-13

## 2018-08-13 DIAGNOSIS — F41.1 GAD (GENERALIZED ANXIETY DISORDER): ICD-10-CM

## 2018-08-13 RX ORDER — CITALOPRAM 10 MG/1
10 TABLET ORAL DAILY
Qty: 90 TABLET | Refills: 0 | Status: SHIPPED | OUTPATIENT
Start: 2018-08-13 | End: 2018-08-20

## 2018-08-17 ENCOUNTER — HOSPITAL ENCOUNTER (OUTPATIENT)
Dept: RADIOLOGY | Age: 23
Discharge: HOME | End: 2018-08-17
Payer: COMMERCIAL

## 2018-08-17 DIAGNOSIS — R10.2 PELVIC PAIN: ICD-10-CM

## 2018-08-17 PROCEDURE — 76830 TRANSVAGINAL US NON-OB: CPT

## 2018-08-17 PROCEDURE — 76856 US EXAM PELVIC COMPLETE: CPT

## 2018-08-20 ENCOUNTER — OFFICE VISIT (OUTPATIENT)
Dept: OBGYN CLINIC | Facility: CLINIC | Age: 23
End: 2018-08-20
Payer: COMMERCIAL

## 2018-08-20 ENCOUNTER — TELEPHONE (OUTPATIENT)
Dept: OBGYN CLINIC | Facility: CLINIC | Age: 23
End: 2018-08-20

## 2018-08-20 VITALS — BODY MASS INDEX: 26.7 KG/M2 | SYSTOLIC BLOOD PRESSURE: 118 MMHG | DIASTOLIC BLOOD PRESSURE: 86 MMHG | WEIGHT: 146 LBS

## 2018-08-20 DIAGNOSIS — R30.0 DYSURIA: Primary | ICD-10-CM

## 2018-08-20 DIAGNOSIS — E55.9 VITAMIN D DEFICIENCY: ICD-10-CM

## 2018-08-20 DIAGNOSIS — N94.10 DYSPAREUNIA IN FEMALE: ICD-10-CM

## 2018-08-20 LAB
SL AMB  POCT GLUCOSE, UA: NORMAL
SL AMB LEUKOCYTE ESTERASE,UA: NORMAL
SL AMB POCT BILIRUBIN,UA: NORMAL
SL AMB POCT BLOOD,UA: NORMAL
SL AMB POCT CLARITY,UA: CLEAR
SL AMB POCT COLOR,UA: YELLOW
SL AMB POCT KETONES,UA: NORMAL
SL AMB POCT NITRITE,UA: NORMAL
SL AMB POCT PH,UA: 6
SL AMB POCT SPECIFIC GRAVITY,UA: 1.02
SL AMB POCT URINE PROTEIN: NORMAL
SL AMB POCT UROBILINOGEN: NORMAL

## 2018-08-20 PROCEDURE — 99214 OFFICE O/P EST MOD 30 MIN: CPT | Performed by: OBSTETRICS & GYNECOLOGY

## 2018-08-20 PROCEDURE — 81003 URINALYSIS AUTO W/O SCOPE: CPT | Performed by: OBSTETRICS & GYNECOLOGY

## 2018-08-20 RX ORDER — ERGOCALCIFEROL 1.25 MG/1
CAPSULE ORAL
Qty: 12 CAPSULE | Refills: 0 | OUTPATIENT
Start: 2018-08-20

## 2018-08-20 NOTE — TELEPHONE ENCOUNTER
Spoke with patient reviewed the gold standard for diagnosis laparoscopy which is usually reserved for patient who have persistent pelvic pain despite medical management  There are no tests/bloodwork/etc  That would tell us  Verbalized understanding

## 2018-08-20 NOTE — PROGRESS NOTES
Assessment Geetha was seen today for results  Diagnoses and all orders for this visit:    Dysuria  -     POCT urine dip auto non-scope    Dyspareunia in female         Plan    No sign of urinary tract infection on urine dip today  No obvious source of pelvic pain  Discussed endometriosis as a possible cause  Patient is currently trying to get pregnant so suppression of cycle with birth control pills is not an option  Recommend monitoring symptoms, seeing if there is a correlation with timing of her cycle, as well as relation to bowel changes  All questions answered to apparent satisfaction  Patient will follow up as needed, or for her annual exam   Patient instructed to call office with positive home pregnancy test     Subjective     Normanchayito Jordan is a 20 y o  female here for a problem visit  Patient is complaining of pain on right side, usually with intercourse, and happens every time  Pain is 8/10, but does not stop having sex, just changes position  Sx's started 9 months ago  Pain is not getting worse but it is not getting better  She is not on anything for birth control  She is trying to conceive  She struggles with constipation  Periods are painful but manageable  She will take ibuprofen which helps with the cramps  US reviewed - shows cysts on left ovary, resolving and measuring 1 6cm  Discussed differential diagnosis of endometriosis, bowel as source of pain/discomfort  Patient has had GI workup and was told she had IBS however she is not convinced of this diagnosis  Patient Active Problem List   Diagnosis    Vitamin D deficiency    Anxiety    Chronic gastritis without bleeding    Exercise-induced asthma    Iron deficiency anemia secondary to inadequate dietary iron intake    Chronic fatigue    Elevated LFTs    Patient desires pregnancy         Gynecologic History  Patient's last menstrual period was 07/23/2018 (exact date)  Contraception: none  Last Pap: 2/2017   Results were: normal      Obstetric History  OB History    Para Term  AB Living   0 0 0 0 0 0   SAB TAB Ectopic Multiple Live Births   0 0 0 0 0             Past Medical/Surgical/Family/Social History  The following portions of the patient's history were reviewed and updated as appropriate: allergies, current medications, past family history, past medical history, past social history, past surgical history and problem list     Allergies  Patient has no known allergies  Medications    Current Outpatient Prescriptions:     albuterol (PROVENTIL HFA,VENTOLIN HFA) 90 mcg/act inhaler, Inhale 2 puffs every 6 (six) hours as needed for wheezing, Disp: , Rfl:     ALPRAZolam (XANAX) 0 25 mg tablet, Take 0 25 mg by mouth 3 (three) times a day as needed  , Disp: , Rfl:     ergocalciferol (VITAMIN D2) 50,000 units, Take 1 capsule (50,000 Units total) by mouth once a week 1 tab qweek x 8 weeks, then 1 tab [id number], Disp: 12 capsule, Rfl: 0    ferrous sulfate 324 (65 Fe) mg, Take 1 tablet (324 mg total) by mouth 2 (two) times a day before meals, Disp: 60 tablet, Rfl: 2    scopolamine (TRANSDERM-SCOP) 1 5 mg/3 days TD 72 hr patch, Place 1 patch on the skin every third day, Disp: 5 patch, Rfl: 0      Review of Systems  Constitutional: no fever, feels well, no tiredness, no recent weight gain or loss  Breasts:no complaints of breast pain, breast lump, or nipple discharge  Gastrointestinal: no complaints of abdominal pain, constipation, nausea, vomiting, or diarrhea or bloody stools  Genitourinary : no complaints of dysuria, incontinence,vaginal discharge or abnormal vaginal bleeding       Objective     /86   Wt 66 2 kg (146 lb)   LMP 2018 (Exact Date)   Breastfeeding?  No   BMI 26 70 kg/m²     General: alert and oriented, in no acute distress

## 2018-08-20 NOTE — TELEPHONE ENCOUNTER
Patient states you discuss the dx of endometriosis at visit  Wants to know if there is any other test that will/can be done to give her a definitive dx of endometriosis  Routing to provider for advise

## 2018-08-20 NOTE — PROGRESS NOTES
Discussed with patient at visit on August 20th  No obvious source of pain  Discussed endometriosis as differential diagnosis  Patient is currently trying to conceive

## 2018-09-13 ENCOUNTER — TELEPHONE (OUTPATIENT)
Dept: FAMILY MEDICINE CLINIC | Facility: CLINIC | Age: 23
End: 2018-09-13

## 2018-09-13 DIAGNOSIS — N92.6 MENSTRUAL CHANGES: ICD-10-CM

## 2018-09-13 DIAGNOSIS — E55.9 VITAMIN D DEFICIENCY: ICD-10-CM

## 2018-09-13 DIAGNOSIS — R53.83 FATIGUE, UNSPECIFIED TYPE: Primary | ICD-10-CM

## 2018-09-13 NOTE — TELEPHONE ENCOUNTER
Patient called saying she has been feeling fatigue and very tired  She is asking if you can put in orders for her to have blood work done  She also stated that there could be a chance of pregnancy  Please advise  Patient would like a return call

## 2018-09-18 ENCOUNTER — TELEPHONE (OUTPATIENT)
Dept: FAMILY MEDICINE CLINIC | Facility: CLINIC | Age: 23
End: 2018-09-18

## 2018-09-18 NOTE — TELEPHONE ENCOUNTER
----- Message from Elina Mckeon DO sent at 9/17/2018  1:05 PM EDT -----  Please call the patient regarding her abnormal result  Labs are all normal- blood count is a little low- can add a mutlivitamin for women with iron

## 2018-10-22 ENCOUNTER — OFFICE VISIT (OUTPATIENT)
Dept: FAMILY MEDICINE CLINIC | Facility: OTHER | Age: 23
End: 2018-10-22
Payer: COMMERCIAL

## 2018-10-22 VITALS
HEART RATE: 80 BPM | OXYGEN SATURATION: 98 % | DIASTOLIC BLOOD PRESSURE: 82 MMHG | BODY MASS INDEX: 25.57 KG/M2 | WEIGHT: 144.3 LBS | SYSTOLIC BLOOD PRESSURE: 122 MMHG | TEMPERATURE: 98.8 F | HEIGHT: 63 IN

## 2018-10-22 DIAGNOSIS — M25.531 RIGHT WRIST PAIN: Primary | ICD-10-CM

## 2018-10-22 DIAGNOSIS — B07.9 VIRAL WARTS, UNSPECIFIED TYPE: ICD-10-CM

## 2018-10-22 PROCEDURE — 99214 OFFICE O/P EST MOD 30 MIN: CPT | Performed by: NURSE PRACTITIONER

## 2018-10-22 PROCEDURE — 3008F BODY MASS INDEX DOCD: CPT | Performed by: NURSE PRACTITIONER

## 2018-10-22 NOTE — PROGRESS NOTES
Assessment/Plan:           Problem List Items Addressed This Visit     Right wrist pain  --No readily apparent cause including CTS, DeQuervain's, etc   Suspect overuse syndrome, but given chronic, gradually worsening nature of her symptoms, will obtain baseline films  --OTC NSAID, ice prn  Wrist brace  --Avoid activities involving repetitive use of right wrist including weights  Relevant Orders    XR wrist 3+ vw right    Ambulatory referral to Orthopedic Surgery      Other Visit Diagnoses     Viral warts, unspecified type      --No nitrogen in stock at this time  Gave her the option of coming back her during time we have spray, going [place], or trying OTC remedies which were reviewed            Subjective:      Patient ID: Brian Boone is a 20 y o  female  [facility] FP patient  Here with complaints of right wrist pain x 1 year  Hard to localize  Feels like it's coming from inside wrist, with no radiation to fingers or elsewhere  Pain is felt primarily with wrist movements, flexion and extension in particular  No numbness/tingling, weakness noted  No swelling  No associated neck or elbow pain  Ice and wrist brace have helped when she uses them  No OTC analgesics used  No pain at present  No known precipitating injuries or activities, although admits to using dumbbells regularly when she works out  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  Right hand dominant  No previous wrist injuries or surgeries  Wart on left index finger x few weeks  Has had numerous warts elsewhere in the past  Freezing usually successful in treating, although warts have sometimes returned  No OTC treatments tried               The following portions of the patient's history were reviewed and updated as appropriate: current medications, past family history, past medical history, past social history, past surgical history and problem list     Review of Systems   Musculoskeletal:        Per HPI Skin:        Per HPI         Objective:      /82 (BP Location: Left arm, Patient Position: Sitting, Cuff Size: Adult)   Pulse 80   Temp 98 8 °F (37 1 °C) (Tympanic)   Ht 5' 3" (1 6 m)   Wt 65 5 kg (144 lb 4 8 oz)   SpO2 98%   BMI 25 56 kg/m²          Physical Exam   Constitutional: She is oriented to person, place, and time  She appears well-developed  No distress  Pulmonary/Chest: Effort normal    Musculoskeletal: She exhibits no edema, tenderness or deformity  Right wrist with normal appearance  No swelling, discoloration, deformity noted  No reproducible tenderness  Normal ROM, with some reproduction of vague discomfort at limits of flexion/extension  No significant increase with resisted maneuvers  5/5 , finger strength  Negative Tinel, Phalen, Finklestein's tests  Fingers with normal temp, color, sensation  Neurological: She is alert and oriented to person, place, and time  Skin:   Pad of left index finger with 5 mm skin colored verrucous papule

## 2018-10-31 ENCOUNTER — TELEPHONE (OUTPATIENT)
Dept: OBGYN CLINIC | Facility: CLINIC | Age: 23
End: 2018-10-31

## 2018-10-31 NOTE — TELEPHONE ENCOUNTER
Patient states she is 2 weeks late for her menses, has done a UPT and negative  She stopped birth control in March and typically gets a regular menses, although she did have one month where is was a little late  Advised to wait one week and do another test if negative  To just monitor if no period next month to call the office  She is trying to conceive  Advised to take folic acid and PNV, states she has been taking  Routing to provider for further advise

## 2018-11-08 ENCOUNTER — INITIAL PRENATAL (OUTPATIENT)
Dept: OBGYN CLINIC | Facility: CLINIC | Age: 23
End: 2018-11-08
Payer: COMMERCIAL

## 2018-11-08 ENCOUNTER — APPOINTMENT (OUTPATIENT)
Dept: LAB | Facility: CLINIC | Age: 23
End: 2018-11-08
Payer: COMMERCIAL

## 2018-11-08 VITALS
SYSTOLIC BLOOD PRESSURE: 132 MMHG | HEART RATE: 80 BPM | RESPIRATION RATE: 20 BRPM | HEIGHT: 63 IN | WEIGHT: 144.2 LBS | DIASTOLIC BLOOD PRESSURE: 86 MMHG | BODY MASS INDEX: 25.55 KG/M2

## 2018-11-08 DIAGNOSIS — Z23 NEED FOR INFLUENZA VACCINATION: ICD-10-CM

## 2018-11-08 DIAGNOSIS — Z34.01 ENCOUNTER FOR SUPERVISION OF NORMAL FIRST PREGNANCY IN FIRST TRIMESTER: Primary | ICD-10-CM

## 2018-11-08 DIAGNOSIS — Z3A.01 LESS THAN 8 WEEKS GESTATION OF PREGNANCY: ICD-10-CM

## 2018-11-08 DIAGNOSIS — Z34.01 ENCOUNTER FOR SUPERVISION OF NORMAL FIRST PREGNANCY IN FIRST TRIMESTER: ICD-10-CM

## 2018-11-08 LAB
ABO GROUP BLD: NORMAL
BASOPHILS # BLD AUTO: 0.01 THOUSANDS/ΜL (ref 0–0.1)
BASOPHILS NFR BLD AUTO: 0 % (ref 0–1)
BILIRUB UR QL STRIP: ABNORMAL
BLD GP AB SCN SERPL QL: NEGATIVE
CLARITY UR: CLEAR
COLOR UR: YELLOW
EOSINOPHIL # BLD AUTO: 0.03 THOUSAND/ΜL (ref 0–0.61)
EOSINOPHIL NFR BLD AUTO: 1 % (ref 0–6)
ERYTHROCYTE [DISTWIDTH] IN BLOOD BY AUTOMATED COUNT: 14.1 % (ref 11.6–15.1)
GLUCOSE UR STRIP-MCNC: NEGATIVE MG/DL
HCT VFR BLD AUTO: 37.3 % (ref 34.8–46.1)
HGB BLD-MCNC: 12.1 G/DL (ref 11.5–15.4)
HGB UR QL STRIP.AUTO: NEGATIVE
IMM GRANULOCYTES # BLD AUTO: 0.01 THOUSAND/UL (ref 0–0.2)
IMM GRANULOCYTES NFR BLD AUTO: 0 % (ref 0–2)
KETONES UR STRIP-MCNC: ABNORMAL MG/DL
LEUKOCYTE ESTERASE UR QL STRIP: NEGATIVE
LYMPHOCYTES # BLD AUTO: 1.87 THOUSANDS/ΜL (ref 0.6–4.47)
LYMPHOCYTES NFR BLD AUTO: 34 % (ref 14–44)
MCH RBC QN AUTO: 27.8 PG (ref 26.8–34.3)
MCHC RBC AUTO-ENTMCNC: 32.4 G/DL (ref 31.4–37.4)
MCV RBC AUTO: 86 FL (ref 82–98)
MONOCYTES # BLD AUTO: 0.34 THOUSAND/ΜL (ref 0.17–1.22)
MONOCYTES NFR BLD AUTO: 6 % (ref 4–12)
NEUTROPHILS # BLD AUTO: 3.22 THOUSANDS/ΜL (ref 1.85–7.62)
NEUTS SEG NFR BLD AUTO: 59 % (ref 43–75)
NITRITE UR QL STRIP: NEGATIVE
NRBC BLD AUTO-RTO: 0 /100 WBCS
PH UR STRIP.AUTO: 6 [PH] (ref 4.5–8)
PLATELET # BLD AUTO: 295 THOUSANDS/UL (ref 149–390)
PMV BLD AUTO: 10.6 FL (ref 8.9–12.7)
PROT UR STRIP-MCNC: NEGATIVE MG/DL
RBC # BLD AUTO: 4.35 MILLION/UL (ref 3.81–5.12)
RH BLD: NEGATIVE
RUBV IGG SERPL IA-ACNC: 137.3 IU/ML
SP GR UR STRIP.AUTO: >=1.03 (ref 1–1.03)
SPECIMEN EXPIRATION DATE: NORMAL
UROBILINOGEN UR QL STRIP.AUTO: 0.2 E.U./DL
WBC # BLD AUTO: 5.48 THOUSAND/UL (ref 4.31–10.16)

## 2018-11-08 PROCEDURE — 90686 IIV4 VACC NO PRSV 0.5 ML IM: CPT | Performed by: OBSTETRICS & GYNECOLOGY

## 2018-11-08 PROCEDURE — 86787 VARICELLA-ZOSTER ANTIBODY: CPT

## 2018-11-08 PROCEDURE — OBC: Performed by: OBSTETRICS & GYNECOLOGY

## 2018-11-08 PROCEDURE — 90471 IMMUNIZATION ADMIN: CPT | Performed by: OBSTETRICS & GYNECOLOGY

## 2018-11-08 PROCEDURE — 80081 OBSTETRIC PANEL INC HIV TSTG: CPT

## 2018-11-08 PROCEDURE — 87086 URINE CULTURE/COLONY COUNT: CPT

## 2018-11-08 PROCEDURE — 36415 COLL VENOUS BLD VENIPUNCTURE: CPT

## 2018-11-08 PROCEDURE — 81003 URINALYSIS AUTO W/O SCOPE: CPT

## 2018-11-08 RX ORDER — CALCIUM CARBONATE 300MG(750)
2 TABLET,CHEWABLE ORAL DAILY
COMMUNITY

## 2018-11-08 NOTE — PROGRESS NOTES
OB intake complete - G 1 , LMP 9/18/18   EDD6/25/19  Hx   Prenatal labs ordered including - prenatal panel, varicella, and ultrasound  S/S of pregnancy - nausea, lower back pain, slight cramping, breast tenderness and fatigue  Genetic screening reviewed -- desires  PHQ 9 screening results - 0 offered [facility] support [facility] if needed, hx of anxiety & depression  Planned pregnancy, FOB involved and supportive   Last Pap -2/2017 negative

## 2018-11-08 NOTE — LETTER
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 [address]  Her expected due date is  19  We recommend that all pregnant women:    Have a well-ventilated work space  Wear low-heeled shoes  Work no more than 40 hours per week  Have a 15 minute break every 2 hours and at least 30 minutes for a meal break  Use good body mechanics by bending at the knees to avoid back strain and lift no more than 20 pounds without assistance  Have ready access to bathrooms and water  She may continue to work until her due date unless medical complications arise  We anticipate she may return to work in 6-8 weeks after delivery  Please contact our office with any questions           MD Ermias Pollard MD, [facility] [place],   Go Danielson DO[facility]

## 2018-11-09 LAB
HBV SURFACE AG SER QL: NORMAL
RPR SER QL: NORMAL

## 2018-11-10 LAB
BACTERIA UR CULT: NORMAL
HIV 1+2 AB+HIV1 P24 AG SERPL QL IA: NORMAL

## 2018-11-12 ENCOUNTER — TELEPHONE (OUTPATIENT)
Dept: OBGYN CLINIC | Facility: CLINIC | Age: 23
End: 2018-11-12

## 2018-11-12 NOTE — TELEPHONE ENCOUNTER
7w6d OB calling with questions  PNV - states PNV does not contain appropriate amounts of DHA, folic acid, calcium, and vit D  Advised she can just add the supplements  Cramping on back advised she may use heating pad  Advise not to apply to abdomen  Increase fluid to help with cramping  Patient states she is having increased anxiety - will place referral for Baby and [address]  Reviewed no laying flat on back after first trimester  Answered some questions about cystic fibrosis screening  Unsure if it is covered by insurance  Advised if both her and partner were positive they would be referred to [facility] for counseling  Reviewed sequential screening with patient  Verbalized understanding

## 2018-11-13 ENCOUNTER — TELEPHONE (OUTPATIENT)
Dept: POSTPARTUM | Facility: CLINIC | Age: 23
End: 2018-11-13

## 2018-11-13 LAB — VZV IGG SER IA-ACNC: NORMAL

## 2018-11-13 NOTE — TELEPHONE ENCOUNTER
Jazmyn Ye from [facility] to set up counseling services  She will call patient and set up appointment

## 2018-11-13 NOTE — TELEPHONE ENCOUNTER
Geetha was referred to us for therapy  I called patient and left a message for her to call back if she was interested in scheduling an appointment with our therapist Lindsey Mercado

## 2018-11-16 ENCOUNTER — HOSPITAL ENCOUNTER (OUTPATIENT)
Dept: RADIOLOGY | Facility: HOSPITAL | Age: 23
Discharge: HOME | End: 2018-11-16
Attending: OBSTETRICS & GYNECOLOGY
Payer: COMMERCIAL

## 2018-11-16 DIAGNOSIS — Z3A.01 LESS THAN 8 WEEKS GESTATION OF PREGNANCY: ICD-10-CM

## 2018-11-16 DIAGNOSIS — Z34.01 ENCOUNTER FOR SUPERVISION OF NORMAL FIRST PREGNANCY IN FIRST TRIMESTER: ICD-10-CM

## 2018-11-16 PROCEDURE — 76801 OB US < 14 WKS SINGLE FETUS: CPT

## 2018-11-18 ENCOUNTER — TELEPHONE (OUTPATIENT)
Dept: LABOR AND DELIVERY | Facility: HOSPITAL | Age: 23
End: 2018-11-18

## 2018-11-18 DIAGNOSIS — Z3A.01 LESS THAN 8 WEEKS GESTATION OF PREGNANCY: Primary | ICD-10-CM

## 2018-11-18 NOTE — PROGRESS NOTES
Patient returned my call and we discussed results  US images reviewed by me  Presence of a gestational sac and definitely 1, possibly 2 yolk sacs present  No identifiable fetal pole  Will repeat US in 11 days - at that time if this is a normal pregnancy a fetal pole and cardiac activity will be present  Orders placed, patient to call and schedule tomorrow

## 2018-11-20 ENCOUNTER — TELEPHONE (OUTPATIENT)
Dept: OBGYN CLINIC | Facility: CLINIC | Age: 23
End: 2018-11-20

## 2018-11-20 NOTE — TELEPHONE ENCOUNTER
Approx 5 wk Ob c/o sore throat, run down, chills, congestion  Advised warm salt-water gargle, Cepacol throat, Sucrets, tylenol, benadryl, saline nasal spray and increase fluid intake  If these are not effective to call PCP  Verbalized understanding  Routing to provider for further advise

## 2018-11-27 ENCOUNTER — TRANSCRIBE ORDERS (OUTPATIENT)
Dept: RADIOLOGY | Facility: HOSPITAL | Age: 23
End: 2018-11-27

## 2018-11-27 ENCOUNTER — HOSPITAL ENCOUNTER (OUTPATIENT)
Dept: RADIOLOGY | Facility: HOSPITAL | Age: 23
Discharge: HOME | End: 2018-11-27
Attending: OBSTETRICS & GYNECOLOGY
Payer: COMMERCIAL

## 2018-11-27 ENCOUNTER — TELEPHONE (OUTPATIENT)
Dept: OBGYN CLINIC | Facility: CLINIC | Age: 23
End: 2018-11-27

## 2018-11-27 DIAGNOSIS — O21.9 NAUSEA AND VOMITING DURING PREGNANCY: Primary | ICD-10-CM

## 2018-11-27 DIAGNOSIS — Z3A.01 LESS THAN 8 WEEKS GESTATION OF PREGNANCY: ICD-10-CM

## 2018-11-27 DIAGNOSIS — Z3A.01 LESS THAN 8 WEEKS GESTATION OF PREGNANCY: Primary | ICD-10-CM

## 2018-11-27 PROCEDURE — 76801 OB US < 14 WKS SINGLE FETUS: CPT

## 2018-11-27 RX ORDER — ONDANSETRON 4 MG/1
4 TABLET, FILM COATED ORAL EVERY 8 HOURS PRN
Qty: 20 TABLET | Refills: 0 | Status: SHIPPED | OUTPATIENT
Start: 2018-11-27 | End: 2018-12-24 | Stop reason: SDUPTHER

## 2018-11-27 NOTE — TELEPHONE ENCOUNTER
Spoke with patient advised zofran sent to pharmacy for nausea  We will call with [facility] as soon as we get the final read from ultrasound  Verbalized understanding

## 2018-11-27 NOTE — TELEPHONE ENCOUNTER
10wk OB calling for nausea medication  States she tried Vit B6 and unisom they are not very effective  She would like to know if she can have something sent to pharmacy  She also states she had a second ultrasound done this morning to be sure there is only one yolk sac  She was told only one baby but would like to know a due date  Pharmacy updated  Routing to provider for advise

## 2018-11-27 NOTE — TELEPHONE ENCOUNTER
I will send in Zofran for the nausea  The final read is not back yet so I can't finalize due date yet, As soon as read is available, we will let her know

## 2018-12-06 ENCOUNTER — ROUTINE PRENATAL (OUTPATIENT)
Dept: OBGYN CLINIC | Facility: CLINIC | Age: 23
End: 2018-12-06

## 2018-12-06 VITALS — SYSTOLIC BLOOD PRESSURE: 118 MMHG | WEIGHT: 147 LBS | BODY MASS INDEX: 26.46 KG/M2 | DIASTOLIC BLOOD PRESSURE: 64 MMHG

## 2018-12-06 DIAGNOSIS — O26.899 RH NEGATIVE STATE IN ANTEPARTUM PERIOD: ICD-10-CM

## 2018-12-06 DIAGNOSIS — Z34.01 ENCOUNTER FOR SUPERVISION OF NORMAL FIRST PREGNANCY IN FIRST TRIMESTER: Primary | ICD-10-CM

## 2018-12-06 DIAGNOSIS — Z11.3 SCREENING FOR STD (SEXUALLY TRANSMITTED DISEASE): ICD-10-CM

## 2018-12-06 DIAGNOSIS — Z67.91 RH NEGATIVE STATE IN ANTEPARTUM PERIOD: ICD-10-CM

## 2018-12-06 PROCEDURE — PNV: Performed by: OBSTETRICS & GYNECOLOGY

## 2018-12-06 PROCEDURE — 87491 CHLMYD TRACH DNA AMP PROBE: CPT | Performed by: OBSTETRICS & GYNECOLOGY

## 2018-12-06 PROCEDURE — 87591 N.GONORRHOEAE DNA AMP PROB: CPT | Performed by: OBSTETRICS & GYNECOLOGY

## 2018-12-06 NOTE — PROGRESS NOTES
Pt here for first visit  Pt feels good, nausea is better  Flu vaccine given at intake appt  Pt due for GC cultures as well  Pt will leave urine sample at the end of visit

## 2018-12-06 NOTE — PROGRESS NOTES
This is a 20 y o  Mari Hernandeze at [address] by 6 wk US/LMP who presents for initial OB visit  Having nausea controlled with zofran  Denies vomiting, cramping, vaginal bleeding  This is a planned and desired pregnancy  BP: 118/64  Ultrasound: Jay IUP with cardiac activity  Normal appearing uterus, bilateral ovaries    Pap up to date  GCC performed  Prenatal labs reviewed and notable for Rh negative  Reviewed indications for RhoGam  Practice policies reviewed  Weight gain, diet, exercise recommendations discussed     Genetic screening unsure - will call office if she decides she wants an order  S/p flu shot

## 2018-12-11 LAB
C TRACH DNA SPEC QL NAA+PROBE: NEGATIVE
N GONORRHOEA DNA SPEC QL NAA+PROBE: NEGATIVE

## 2018-12-12 ENCOUNTER — TELEPHONE (OUTPATIENT)
Dept: OBGYN CLINIC | Facility: CLINIC | Age: 23
End: 2018-12-12

## 2018-12-12 NOTE — TELEPHONE ENCOUNTER
9wk OB c/o constipation  Advised she can take miralax, colace twice a day, smooth move tea, glycerin suppository if needed or even an enema if needed  Advised to increase fluid and fiber intake  Verbalized understanding  Routing to provider for further advise

## 2018-12-20 ENCOUNTER — TELEPHONE (OUTPATIENT)
Dept: OBGYN CLINIC | Facility: CLINIC | Age: 23
End: 2018-12-20

## 2018-12-20 NOTE — TELEPHONE ENCOUNTER
10w1d OB c/o  pain lower abdomen  States it has gotten better since she is home from work  Advised to increase fluid intake if worsen or is constant to call the office  Verbalized understanding  Routing to provider for further advise

## 2018-12-24 ENCOUNTER — TELEPHONE (OUTPATIENT)
Dept: OBGYN CLINIC | Facility: CLINIC | Age: 23
End: 2018-12-24

## 2018-12-24 DIAGNOSIS — O21.9 NAUSEA AND VOMITING DURING PREGNANCY: ICD-10-CM

## 2018-12-24 RX ORDER — ONDANSETRON 4 MG/1
4 TABLET, FILM COATED ORAL EVERY 8 HOURS PRN
Qty: 30 TABLET | Refills: 0 | Status: SHIPPED | OUTPATIENT
Start: 2018-12-24 | End: 2019-07-08

## 2019-01-07 ENCOUNTER — ROUTINE PRENATAL (OUTPATIENT)
Dept: OBGYN CLINIC | Facility: CLINIC | Age: 24
End: 2019-01-07

## 2019-01-07 VITALS — DIASTOLIC BLOOD PRESSURE: 74 MMHG | WEIGHT: 146 LBS | BODY MASS INDEX: 26.28 KG/M2 | SYSTOLIC BLOOD PRESSURE: 120 MMHG

## 2019-01-07 DIAGNOSIS — Z67.91 RH NEGATIVE STATE IN ANTEPARTUM PERIOD: ICD-10-CM

## 2019-01-07 DIAGNOSIS — O26.899 RH NEGATIVE STATE IN ANTEPARTUM PERIOD: ICD-10-CM

## 2019-01-07 DIAGNOSIS — Z3A.12 12 WEEKS GESTATION OF PREGNANCY: Primary | ICD-10-CM

## 2019-01-07 PROCEDURE — PNV: Performed by: OBSTETRICS & GYNECOLOGY

## 2019-01-07 NOTE — PROGRESS NOTES
Pt is feeling much better with the nausea  Pt has no complaints  Pt could not leave urine at the time of vitals

## 2019-01-08 PROBLEM — Z31.9 PATIENT DESIRES PREGNANCY: Status: RESOLVED | Noted: 2018-05-02 | Resolved: 2019-01-08

## 2019-01-08 PROBLEM — Z34.81 ENCOUNTER FOR SUPERVISION OF OTHER NORMAL PREGNANCY, FIRST TRIMESTER: Status: ACTIVE | Noted: 2019-01-08

## 2019-01-08 PROBLEM — D50.8 IRON DEFICIENCY ANEMIA SECONDARY TO INADEQUATE DIETARY IRON INTAKE: Status: RESOLVED | Noted: 2018-05-02 | Resolved: 2019-01-08

## 2019-01-08 NOTE — PROGRESS NOTES
20 y o    female at 12 9 wga EGA for PNV  BP : 120/74  TWG: 3  Pt feeling well  Some nausea  Referral for SeqScreen placed    CF and SMA ordered  Answered several questions regarding food and activity in preg  F/u 4 weeks

## 2019-01-09 ENCOUNTER — ROUTINE PRENATAL (OUTPATIENT)
Dept: PERINATAL CARE | Facility: CLINIC | Age: 24
End: 2019-01-09
Payer: COMMERCIAL

## 2019-01-09 VITALS
HEART RATE: 84 BPM | DIASTOLIC BLOOD PRESSURE: 84 MMHG | HEIGHT: 62 IN | BODY MASS INDEX: 26.68 KG/M2 | SYSTOLIC BLOOD PRESSURE: 127 MMHG | WEIGHT: 145 LBS

## 2019-01-09 DIAGNOSIS — Z36.82 ENCOUNTER FOR ANTENATAL SCREENING FOR NUCHAL TRANSLUCENCY: Primary | ICD-10-CM

## 2019-01-09 DIAGNOSIS — Z3A.13 13 WEEKS GESTATION OF PREGNANCY: ICD-10-CM

## 2019-01-09 PROCEDURE — 99201 PR OFFICE OUTPATIENT NEW 10 MINUTES: CPT | Performed by: OBSTETRICS & GYNECOLOGY

## 2019-01-09 PROCEDURE — 76813 OB US NUCHAL MEAS 1 GEST: CPT | Performed by: OBSTETRICS & GYNECOLOGY

## 2019-01-09 NOTE — PATIENT INSTRUCTIONS
Thank you for choosing [facility] for your  care today  If you have any questions about your ultrasound or care, please do not hesitate to contact us or your primary obstetrician

## 2019-01-09 NOTE — PROGRESS NOTES
[address]: Ms Prince Brown was seen today at 13w0d for nuchal translucency ultrasound  See ultrasound report under "OB Procedures" tab  Please don't hesitate to contact our office with any concerns or questions    Sam Goldman MD

## 2019-02-08 ENCOUNTER — ROUTINE PRENATAL (OUTPATIENT)
Dept: OBGYN CLINIC | Facility: CLINIC | Age: 24
End: 2019-02-08

## 2019-02-08 VITALS — BODY MASS INDEX: 27.8 KG/M2 | WEIGHT: 152 LBS | DIASTOLIC BLOOD PRESSURE: 62 MMHG | SYSTOLIC BLOOD PRESSURE: 122 MMHG

## 2019-02-08 DIAGNOSIS — O99.891 BACK PAIN AFFECTING PREGNANCY IN SECOND TRIMESTER: ICD-10-CM

## 2019-02-08 DIAGNOSIS — Z67.91 RH NEGATIVE STATE IN ANTEPARTUM PERIOD: ICD-10-CM

## 2019-02-08 DIAGNOSIS — M54.9 BACK PAIN AFFECTING PREGNANCY IN SECOND TRIMESTER: ICD-10-CM

## 2019-02-08 DIAGNOSIS — O26.899 RH NEGATIVE STATE IN ANTEPARTUM PERIOD: ICD-10-CM

## 2019-02-08 DIAGNOSIS — Z34.02 ENCOUNTER FOR SUPERVISION OF NORMAL FIRST PREGNANCY IN SECOND TRIMESTER: Primary | ICD-10-CM

## 2019-02-08 DIAGNOSIS — Z3A.17 17 WEEKS GESTATION OF PREGNANCY: ICD-10-CM

## 2019-02-08 PROCEDURE — PNV: Performed by: OBSTETRICS & GYNECOLOGY

## 2019-02-08 NOTE — PROGRESS NOTES
30 y o    female at [address] [place] [address] for PNV  BP : 122/62  TWlbs  Denies contractions/LOF/vaginal bleeding  + FM x 1 week  Wart on her finger that is bothering her - ok to get removed  Neck/back pain - sees chiropractor daily  Discussed physical therapy - information given  Sometimes sees a "blue dot," not sure which eye, not consistent  Resolves spontaneously  Concerned about general  rate based on what she hears from friends/families over the past year  Discussed some risk factors, I feel that patient is pretty low risk at this point but discussed that labor is unpredictable  Patient has 20 wk US scheduled

## 2019-02-13 ENCOUNTER — TRANSCRIBE ORDERS (OUTPATIENT)
Dept: LAB | Facility: CLINIC | Age: 24
End: 2019-02-13

## 2019-02-13 ENCOUNTER — APPOINTMENT (OUTPATIENT)
Dept: LAB | Facility: CLINIC | Age: 24
End: 2019-02-13
Payer: COMMERCIAL

## 2019-02-13 DIAGNOSIS — Z33.1 PREGNANT STATE, INCIDENTAL: Primary | ICD-10-CM

## 2019-02-13 DIAGNOSIS — Z33.1 PREGNANT STATE, INCIDENTAL: ICD-10-CM

## 2019-02-13 PROCEDURE — 36415 COLL VENOUS BLD VENIPUNCTURE: CPT

## 2019-02-14 LAB — SCAN RESULT: NORMAL

## 2019-02-18 ENCOUNTER — TELEPHONE (OUTPATIENT)
Dept: PERINATAL CARE | Facility: CLINIC | Age: 24
End: 2019-02-18

## 2019-02-18 NOTE — LETTER
Name: Taylor Morfin  : 1995  MRN: [id number]    To:   Centralized Faxing Team [phone]      The attached documents are complete  Please scan and add into the patient's chart  No other action is needed at this time  Please call us with any questions at [phone]      Ashley Francois RN

## 2019-02-18 NOTE — TELEPHONE ENCOUNTER
Message left to call to receive sequential screen part 2 results WNL  No communication consent on chart

## 2019-02-21 ENCOUNTER — TELEPHONE (OUTPATIENT)
Dept: PERINATAL CARE | Facility: CLINIC | Age: 24
End: 2019-02-21

## 2019-02-28 ENCOUNTER — ROUTINE PRENATAL (OUTPATIENT)
Dept: PERINATAL CARE | Facility: CLINIC | Age: 24
End: 2019-02-28
Payer: COMMERCIAL

## 2019-02-28 VITALS
WEIGHT: 158.8 LBS | DIASTOLIC BLOOD PRESSURE: 84 MMHG | BODY MASS INDEX: 29.22 KG/M2 | HEIGHT: 62 IN | SYSTOLIC BLOOD PRESSURE: 119 MMHG | HEART RATE: 99 BPM

## 2019-02-28 DIAGNOSIS — Z3A.20 20 WEEKS GESTATION OF PREGNANCY: Primary | ICD-10-CM

## 2019-02-28 DIAGNOSIS — Z67.91 RH NEGATIVE STATE IN ANTEPARTUM PERIOD: ICD-10-CM

## 2019-02-28 DIAGNOSIS — O26.899 RH NEGATIVE STATE IN ANTEPARTUM PERIOD: ICD-10-CM

## 2019-02-28 PROCEDURE — 76817 TRANSVAGINAL US OBSTETRIC: CPT | Performed by: OBSTETRICS & GYNECOLOGY

## 2019-02-28 PROCEDURE — 76811 OB US DETAILED SNGL FETUS: CPT | Performed by: OBSTETRICS & GYNECOLOGY

## 2019-02-28 NOTE — PROGRESS NOTES
A transvaginal ultrasound was performed  Sonographer note on use of High Level Disinfection Process (Trophon) for transvaginal probe#3 used, serial [id number]    Seleta Shows, [address]

## 2019-03-01 PROBLEM — Z3A.20 20 WEEKS GESTATION OF PREGNANCY: Status: ACTIVE | Noted: 2019-01-07

## 2019-03-04 ENCOUNTER — ROUTINE PRENATAL (OUTPATIENT)
Dept: OBGYN CLINIC | Facility: CLINIC | Age: 24
End: 2019-03-04

## 2019-03-04 VITALS — BODY MASS INDEX: 29.34 KG/M2 | SYSTOLIC BLOOD PRESSURE: 120 MMHG | DIASTOLIC BLOOD PRESSURE: 74 MMHG | WEIGHT: 160.4 LBS

## 2019-03-04 DIAGNOSIS — Z34.81 ENCOUNTER FOR SUPERVISION OF OTHER NORMAL PREGNANCY, FIRST TRIMESTER: ICD-10-CM

## 2019-03-04 DIAGNOSIS — Z3A.20 20 WEEKS GESTATION OF PREGNANCY: Primary | ICD-10-CM

## 2019-03-04 PROCEDURE — PNV: Performed by: OBSTETRICS & GYNECOLOGY

## 2019-03-04 NOTE — PROGRESS NOTES
This is a 30 y o  Mian Pau at 20w5d who presents for return OB visit  No complaints  Denies contractions, leakage, bleeding  Endorses fetal movement  BP: 120/7 TWlb  Normal level 2 but not all anatomy seen - has repeat US at end of this month  It's a Girl!     F/up 4 wks

## 2019-03-21 ENCOUNTER — ULTRASOUND (OUTPATIENT)
Dept: PERINATAL CARE | Facility: CLINIC | Age: 24
End: 2019-03-21
Payer: COMMERCIAL

## 2019-03-21 VITALS
SYSTOLIC BLOOD PRESSURE: 117 MMHG | DIASTOLIC BLOOD PRESSURE: 78 MMHG | BODY MASS INDEX: 30.18 KG/M2 | HEART RATE: 85 BPM | WEIGHT: 164 LBS | HEIGHT: 62 IN

## 2019-03-21 DIAGNOSIS — IMO0002 EVALUATE ANATOMY NOT SEEN ON PRIOR SONOGRAM: Primary | ICD-10-CM

## 2019-03-21 DIAGNOSIS — Z3A.23 23 WEEKS GESTATION OF PREGNANCY: ICD-10-CM

## 2019-03-21 PROBLEM — M25.531 RIGHT WRIST PAIN: Status: RESOLVED | Noted: 2018-10-22 | Resolved: 2019-03-21

## 2019-03-21 PROCEDURE — 99212 OFFICE O/P EST SF 10 MIN: CPT | Performed by: OBSTETRICS & GYNECOLOGY

## 2019-03-21 PROCEDURE — 76816 OB US FOLLOW-UP PER FETUS: CPT | Performed by: OBSTETRICS & GYNECOLOGY

## 2019-03-21 NOTE — PROGRESS NOTES
[address]: Ms Brian Camp was seen today at 23w1d for followup missed anatomy ultrasound  See ultrasound report under "OB Procedures" tab  Please don't hesitate to contact our office with any concerns or questions    Ramon Spicer MD

## 2019-03-21 NOTE — PATIENT INSTRUCTIONS
Thank you for choosing [id number] for your  care today  If you have any questions about your ultrasound or care, please do not hesitate to contact us or your primary obstetrician

## 2019-03-22 ENCOUNTER — TELEPHONE (OUTPATIENT)
Dept: PERINATAL CARE | Facility: CLINIC | Age: 24
End: 2019-03-22

## 2019-03-22 NOTE — TELEPHONE ENCOUNTER
Patient left VMM on nurse line- had US done @ [facility], verbalizing concerned why follow up needed  Thought of questions after she left office  Pt reassured, explained  that Dr García Leader is recommending she return for a follow up ultrasound to check the baby's growth at 32 weeks  Reviewed with patient what inhibin is an how it could affect baby's growth  All questions answered and patient verbalized reassurance

## 2019-04-02 ENCOUNTER — OFFICE VISIT (OUTPATIENT)
Dept: FAMILY MEDICINE CLINIC | Facility: CLINIC | Age: 24
End: 2019-04-02
Payer: COMMERCIAL

## 2019-04-02 VITALS
WEIGHT: 167.8 LBS | RESPIRATION RATE: 16 BRPM | HEART RATE: 84 BPM | SYSTOLIC BLOOD PRESSURE: 124 MMHG | HEIGHT: 63 IN | TEMPERATURE: 97.8 F | DIASTOLIC BLOOD PRESSURE: 76 MMHG | BODY MASS INDEX: 29.73 KG/M2

## 2019-04-02 DIAGNOSIS — Z00.00 WELL ADULT EXAM: Primary | ICD-10-CM

## 2019-04-02 DIAGNOSIS — B07.8 OTHER VIRAL WARTS: ICD-10-CM

## 2019-04-02 PROCEDURE — 99395 PREV VISIT EST AGE 18-39: CPT | Performed by: FAMILY MEDICINE

## 2019-04-03 ENCOUNTER — ROUTINE PRENATAL (OUTPATIENT)
Dept: OBGYN CLINIC | Facility: CLINIC | Age: 24
End: 2019-04-03

## 2019-04-03 VITALS — SYSTOLIC BLOOD PRESSURE: 126 MMHG | WEIGHT: 168.6 LBS | DIASTOLIC BLOOD PRESSURE: 70 MMHG | BODY MASS INDEX: 29.87 KG/M2

## 2019-04-03 DIAGNOSIS — Z67.91 RH NEGATIVE STATE IN ANTEPARTUM PERIOD: ICD-10-CM

## 2019-04-03 DIAGNOSIS — Z34.02 ENCOUNTER FOR SUPERVISION OF NORMAL FIRST PREGNANCY IN SECOND TRIMESTER: Primary | ICD-10-CM

## 2019-04-03 DIAGNOSIS — O26.899 RH NEGATIVE STATE IN ANTEPARTUM PERIOD: ICD-10-CM

## 2019-04-03 DIAGNOSIS — Z3A.25 25 WEEKS GESTATION OF PREGNANCY: ICD-10-CM

## 2019-04-03 PROBLEM — O09.899 HIGH RISK PREGNANCY WITH HIGH INHIBIN: Status: ACTIVE | Noted: 2019-04-03

## 2019-04-03 PROBLEM — O28.8 HIGH RISK PREGNANCY WITH HIGH INHIBIN: Status: ACTIVE | Noted: 2019-04-03

## 2019-04-03 PROCEDURE — PNV: Performed by: OBSTETRICS & GYNECOLOGY

## 2019-04-04 ENCOUNTER — PATIENT MESSAGE (OUTPATIENT)
Dept: FAMILY MEDICINE CLINIC | Facility: CLINIC | Age: 24
End: 2019-04-04

## 2019-04-12 ENCOUNTER — ROUTINE PRENATAL (OUTPATIENT)
Dept: OBGYN CLINIC | Facility: CLINIC | Age: 24
End: 2019-04-12

## 2019-04-12 VITALS — BODY MASS INDEX: 30.47 KG/M2 | DIASTOLIC BLOOD PRESSURE: 76 MMHG | SYSTOLIC BLOOD PRESSURE: 122 MMHG | WEIGHT: 172 LBS

## 2019-04-12 DIAGNOSIS — M89.9 PUBIC BONE PAIN: ICD-10-CM

## 2019-04-12 DIAGNOSIS — Z3A.26 26 WEEKS GESTATION OF PREGNANCY: Primary | ICD-10-CM

## 2019-04-12 PROCEDURE — PNV: Performed by: OBSTETRICS & GYNECOLOGY

## 2019-04-23 ENCOUNTER — EVALUATION (OUTPATIENT)
Dept: PHYSICAL THERAPY | Facility: REHABILITATION | Age: 24
End: 2019-04-23
Payer: COMMERCIAL

## 2019-04-23 DIAGNOSIS — M89.9 PUBIC BONE PAIN: ICD-10-CM

## 2019-04-23 DIAGNOSIS — O99.891 BACK PAIN AFFECTING PREGNANCY IN SECOND TRIMESTER: ICD-10-CM

## 2019-04-23 DIAGNOSIS — Z3A.26 26 WEEKS GESTATION OF PREGNANCY: ICD-10-CM

## 2019-04-23 DIAGNOSIS — M54.9 BACK PAIN AFFECTING PREGNANCY IN SECOND TRIMESTER: ICD-10-CM

## 2019-04-23 PROCEDURE — 97110 THERAPEUTIC EXERCISES: CPT | Performed by: PHYSICAL THERAPIST

## 2019-04-23 PROCEDURE — 97140 MANUAL THERAPY 1/> REGIONS: CPT | Performed by: PHYSICAL THERAPIST

## 2019-04-23 PROCEDURE — 97162 PT EVAL MOD COMPLEX 30 MIN: CPT | Performed by: PHYSICAL THERAPIST

## 2019-04-30 ENCOUNTER — OFFICE VISIT (OUTPATIENT)
Dept: PHYSICAL THERAPY | Facility: REHABILITATION | Age: 24
End: 2019-04-30
Payer: COMMERCIAL

## 2019-04-30 ENCOUNTER — TELEPHONE (OUTPATIENT)
Dept: OBGYN CLINIC | Facility: CLINIC | Age: 24
End: 2019-04-30

## 2019-04-30 DIAGNOSIS — O99.891 BACK PAIN AFFECTING PREGNANCY IN SECOND TRIMESTER: ICD-10-CM

## 2019-04-30 DIAGNOSIS — M54.9 BACK PAIN AFFECTING PREGNANCY IN SECOND TRIMESTER: ICD-10-CM

## 2019-04-30 DIAGNOSIS — M89.9 PUBIC BONE PAIN: Primary | ICD-10-CM

## 2019-04-30 PROCEDURE — 97112 NEUROMUSCULAR REEDUCATION: CPT | Performed by: PHYSICAL THERAPIST

## 2019-04-30 PROCEDURE — 97140 MANUAL THERAPY 1/> REGIONS: CPT | Performed by: PHYSICAL THERAPIST

## 2019-05-01 ENCOUNTER — TELEPHONE (OUTPATIENT)
Dept: OTHER | Facility: OTHER | Age: 24
End: 2019-05-01

## 2019-05-01 ENCOUNTER — OFFICE VISIT (OUTPATIENT)
Dept: URGENT CARE | Age: 24
End: 2019-05-01
Payer: COMMERCIAL

## 2019-05-01 VITALS
SYSTOLIC BLOOD PRESSURE: 131 MMHG | HEART RATE: 95 BPM | DIASTOLIC BLOOD PRESSURE: 65 MMHG | OXYGEN SATURATION: 99 % | RESPIRATION RATE: 18 BRPM | TEMPERATURE: 98 F

## 2019-05-01 DIAGNOSIS — J06.9 VIRAL UPPER RESPIRATORY TRACT INFECTION: Primary | ICD-10-CM

## 2019-05-01 PROCEDURE — 99213 OFFICE O/P EST LOW 20 MIN: CPT | Performed by: FAMILY MEDICINE

## 2019-05-02 ENCOUNTER — OFFICE VISIT (OUTPATIENT)
Dept: PHYSICAL THERAPY | Facility: REHABILITATION | Age: 24
End: 2019-05-02
Payer: COMMERCIAL

## 2019-05-02 ENCOUNTER — ROUTINE PRENATAL (OUTPATIENT)
Dept: OBGYN CLINIC | Facility: CLINIC | Age: 24
End: 2019-05-02
Payer: COMMERCIAL

## 2019-05-02 VITALS — BODY MASS INDEX: 30.47 KG/M2 | SYSTOLIC BLOOD PRESSURE: 128 MMHG | WEIGHT: 172 LBS | DIASTOLIC BLOOD PRESSURE: 74 MMHG

## 2019-05-02 DIAGNOSIS — Z34.81 ENCOUNTER FOR SUPERVISION OF OTHER NORMAL PREGNANCY, FIRST TRIMESTER: ICD-10-CM

## 2019-05-02 DIAGNOSIS — Z67.91 RH NEGATIVE STATE IN ANTEPARTUM PERIOD: ICD-10-CM

## 2019-05-02 DIAGNOSIS — Z3A.29 29 WEEKS GESTATION OF PREGNANCY: ICD-10-CM

## 2019-05-02 DIAGNOSIS — O99.891 BACK PAIN AFFECTING PREGNANCY IN SECOND TRIMESTER: ICD-10-CM

## 2019-05-02 DIAGNOSIS — M89.9 PUBIC BONE PAIN: Primary | ICD-10-CM

## 2019-05-02 DIAGNOSIS — O26.899 RH NEGATIVE STATE IN ANTEPARTUM PERIOD: ICD-10-CM

## 2019-05-02 DIAGNOSIS — M54.9 BACK PAIN AFFECTING PREGNANCY IN SECOND TRIMESTER: ICD-10-CM

## 2019-05-02 PROCEDURE — PNV: Performed by: OBSTETRICS & GYNECOLOGY

## 2019-05-02 PROCEDURE — 97112 NEUROMUSCULAR REEDUCATION: CPT | Performed by: PHYSICAL THERAPIST

## 2019-05-02 PROCEDURE — 97140 MANUAL THERAPY 1/> REGIONS: CPT | Performed by: PHYSICAL THERAPIST

## 2019-05-02 PROCEDURE — 97110 THERAPEUTIC EXERCISES: CPT | Performed by: PHYSICAL THERAPIST

## 2019-05-02 PROCEDURE — 96372 THER/PROPH/DIAG INJ SC/IM: CPT | Performed by: OBSTETRICS & GYNECOLOGY

## 2019-05-02 RX ORDER — PSEUDOEPHEDRINE HYDROCHLORIDE 30 MG/1
60 TABLET ORAL EVERY 4 HOURS PRN
COMMUNITY
End: 2019-05-13

## 2019-05-07 ENCOUNTER — OFFICE VISIT (OUTPATIENT)
Dept: PHYSICAL THERAPY | Facility: REHABILITATION | Age: 24
End: 2019-05-07
Payer: COMMERCIAL

## 2019-05-07 DIAGNOSIS — M54.9 BACK PAIN AFFECTING PREGNANCY IN SECOND TRIMESTER: ICD-10-CM

## 2019-05-07 DIAGNOSIS — M89.9 PUBIC BONE PAIN: Primary | ICD-10-CM

## 2019-05-07 DIAGNOSIS — Z3A.26 26 WEEKS GESTATION OF PREGNANCY: ICD-10-CM

## 2019-05-07 DIAGNOSIS — O99.891 BACK PAIN AFFECTING PREGNANCY IN SECOND TRIMESTER: ICD-10-CM

## 2019-05-07 PROCEDURE — 97112 NEUROMUSCULAR REEDUCATION: CPT | Performed by: PHYSICAL THERAPIST

## 2019-05-07 PROCEDURE — 97140 MANUAL THERAPY 1/> REGIONS: CPT | Performed by: PHYSICAL THERAPIST

## 2019-05-07 PROCEDURE — 97110 THERAPEUTIC EXERCISES: CPT | Performed by: PHYSICAL THERAPIST

## 2019-05-09 ENCOUNTER — OFFICE VISIT (OUTPATIENT)
Dept: PHYSICAL THERAPY | Facility: REHABILITATION | Age: 24
End: 2019-05-09
Payer: COMMERCIAL

## 2019-05-09 DIAGNOSIS — O99.891 BACK PAIN AFFECTING PREGNANCY IN SECOND TRIMESTER: ICD-10-CM

## 2019-05-09 DIAGNOSIS — M89.9 PUBIC BONE PAIN: Primary | ICD-10-CM

## 2019-05-09 DIAGNOSIS — M54.9 BACK PAIN AFFECTING PREGNANCY IN SECOND TRIMESTER: ICD-10-CM

## 2019-05-09 PROCEDURE — 97140 MANUAL THERAPY 1/> REGIONS: CPT | Performed by: PHYSICAL THERAPIST

## 2019-05-09 PROCEDURE — 97112 NEUROMUSCULAR REEDUCATION: CPT | Performed by: PHYSICAL THERAPIST

## 2019-05-09 PROCEDURE — 97110 THERAPEUTIC EXERCISES: CPT | Performed by: PHYSICAL THERAPIST

## 2019-05-13 ENCOUNTER — ROUTINE PRENATAL (OUTPATIENT)
Dept: OBGYN CLINIC | Facility: CLINIC | Age: 24
End: 2019-05-13
Payer: COMMERCIAL

## 2019-05-13 VITALS — WEIGHT: 176 LBS | SYSTOLIC BLOOD PRESSURE: 124 MMHG | BODY MASS INDEX: 31.18 KG/M2 | DIASTOLIC BLOOD PRESSURE: 72 MMHG

## 2019-05-13 DIAGNOSIS — Z3A.30 30 WEEKS GESTATION OF PREGNANCY: ICD-10-CM

## 2019-05-13 DIAGNOSIS — Z23 NEED FOR TDAP VACCINATION: ICD-10-CM

## 2019-05-13 DIAGNOSIS — Z67.91 RH NEGATIVE STATE IN ANTEPARTUM PERIOD: ICD-10-CM

## 2019-05-13 DIAGNOSIS — O26.899 RH NEGATIVE STATE IN ANTEPARTUM PERIOD: ICD-10-CM

## 2019-05-13 DIAGNOSIS — Z34.83 ENCOUNTER FOR SUPERVISION OF OTHER NORMAL PREGNANCY, THIRD TRIMESTER: Primary | ICD-10-CM

## 2019-05-13 PROBLEM — Z34.82 ENCOUNTER FOR SUPERVISION OF OTHER NORMAL PREGNANCY, SECOND TRIMESTER: Status: ACTIVE | Noted: 2019-01-08

## 2019-05-13 PROCEDURE — PNV: Performed by: OBSTETRICS & GYNECOLOGY

## 2019-05-13 PROCEDURE — 90471 IMMUNIZATION ADMIN: CPT | Performed by: OBSTETRICS & GYNECOLOGY

## 2019-05-13 PROCEDURE — 90715 TDAP VACCINE 7 YRS/> IM: CPT | Performed by: OBSTETRICS & GYNECOLOGY

## 2019-05-14 ENCOUNTER — OFFICE VISIT (OUTPATIENT)
Dept: PHYSICAL THERAPY | Facility: REHABILITATION | Age: 24
End: 2019-05-14
Payer: COMMERCIAL

## 2019-05-14 DIAGNOSIS — M54.9 BACK PAIN AFFECTING PREGNANCY IN SECOND TRIMESTER: ICD-10-CM

## 2019-05-14 DIAGNOSIS — O99.891 BACK PAIN AFFECTING PREGNANCY IN SECOND TRIMESTER: ICD-10-CM

## 2019-05-14 DIAGNOSIS — M89.9 PUBIC BONE PAIN: Primary | ICD-10-CM

## 2019-05-14 PROCEDURE — 97112 NEUROMUSCULAR REEDUCATION: CPT | Performed by: PHYSICAL THERAPIST

## 2019-05-14 PROCEDURE — 97140 MANUAL THERAPY 1/> REGIONS: CPT | Performed by: PHYSICAL THERAPIST

## 2019-05-16 ENCOUNTER — OFFICE VISIT (OUTPATIENT)
Dept: PHYSICAL THERAPY | Facility: REHABILITATION | Age: 24
End: 2019-05-16
Payer: COMMERCIAL

## 2019-05-16 DIAGNOSIS — O99.891 BACK PAIN AFFECTING PREGNANCY IN SECOND TRIMESTER: ICD-10-CM

## 2019-05-16 DIAGNOSIS — M54.9 BACK PAIN AFFECTING PREGNANCY IN SECOND TRIMESTER: ICD-10-CM

## 2019-05-16 DIAGNOSIS — M89.9 PUBIC BONE PAIN: Primary | ICD-10-CM

## 2019-05-16 PROCEDURE — 97112 NEUROMUSCULAR REEDUCATION: CPT | Performed by: PHYSICAL THERAPIST

## 2019-05-16 PROCEDURE — 97140 MANUAL THERAPY 1/> REGIONS: CPT | Performed by: PHYSICAL THERAPIST

## 2019-05-16 PROCEDURE — 97110 THERAPEUTIC EXERCISES: CPT | Performed by: PHYSICAL THERAPIST

## 2019-05-21 ENCOUNTER — OFFICE VISIT (OUTPATIENT)
Dept: PHYSICAL THERAPY | Facility: REHABILITATION | Age: 24
End: 2019-05-21
Payer: COMMERCIAL

## 2019-05-21 DIAGNOSIS — M54.9 BACK PAIN AFFECTING PREGNANCY IN SECOND TRIMESTER: Primary | ICD-10-CM

## 2019-05-21 DIAGNOSIS — O99.891 BACK PAIN AFFECTING PREGNANCY IN SECOND TRIMESTER: Primary | ICD-10-CM

## 2019-05-21 DIAGNOSIS — M89.9 PUBIC BONE PAIN: ICD-10-CM

## 2019-05-21 PROCEDURE — 97140 MANUAL THERAPY 1/> REGIONS: CPT | Performed by: PHYSICAL THERAPIST

## 2019-05-21 PROCEDURE — 97110 THERAPEUTIC EXERCISES: CPT | Performed by: PHYSICAL THERAPIST

## 2019-05-22 PROBLEM — Z3A.30 30 WEEKS GESTATION OF PREGNANCY: Status: RESOLVED | Noted: 2019-01-07 | Resolved: 2019-05-22

## 2019-05-23 ENCOUNTER — ULTRASOUND (OUTPATIENT)
Dept: PERINATAL CARE | Facility: CLINIC | Age: 24
End: 2019-05-23
Payer: COMMERCIAL

## 2019-05-23 ENCOUNTER — APPOINTMENT (OUTPATIENT)
Dept: PHYSICAL THERAPY | Facility: REHABILITATION | Age: 24
End: 2019-05-23
Payer: COMMERCIAL

## 2019-05-23 VITALS
HEART RATE: 85 BPM | DIASTOLIC BLOOD PRESSURE: 82 MMHG | SYSTOLIC BLOOD PRESSURE: 117 MMHG | WEIGHT: 181.4 LBS | BODY MASS INDEX: 33.38 KG/M2 | HEIGHT: 62 IN

## 2019-05-23 DIAGNOSIS — Z36.89 ENCOUNTER FOR ULTRASOUND TO CHECK FETAL GROWTH: ICD-10-CM

## 2019-05-23 DIAGNOSIS — O09.899 HIGH RISK PREGNANCY WITH HIGH INHIBIN: Primary | ICD-10-CM

## 2019-05-23 DIAGNOSIS — O28.8 HIGH RISK PREGNANCY WITH HIGH INHIBIN: Primary | ICD-10-CM

## 2019-05-23 DIAGNOSIS — Z3A.32 32 WEEKS GESTATION OF PREGNANCY: ICD-10-CM

## 2019-05-23 DIAGNOSIS — O40.3XX0 POLYHYDRAMNIOS, THIRD TRIMESTER, NOT APPLICABLE OR UNSPECIFIED FETUS: ICD-10-CM

## 2019-05-23 PROCEDURE — 76816 OB US FOLLOW-UP PER FETUS: CPT | Performed by: OBSTETRICS & GYNECOLOGY

## 2019-05-23 PROCEDURE — 99212 OFFICE O/P EST SF 10 MIN: CPT | Performed by: OBSTETRICS & GYNECOLOGY

## 2019-05-23 PROCEDURE — 76819 FETAL BIOPHYS PROFIL W/O NST: CPT | Performed by: OBSTETRICS & GYNECOLOGY

## 2019-05-28 ENCOUNTER — OFFICE VISIT (OUTPATIENT)
Dept: PHYSICAL THERAPY | Facility: REHABILITATION | Age: 24
End: 2019-05-28
Payer: COMMERCIAL

## 2019-05-28 DIAGNOSIS — O99.891 BACK PAIN AFFECTING PREGNANCY IN SECOND TRIMESTER: Primary | ICD-10-CM

## 2019-05-28 DIAGNOSIS — M54.9 BACK PAIN AFFECTING PREGNANCY IN SECOND TRIMESTER: Primary | ICD-10-CM

## 2019-05-28 DIAGNOSIS — Z3A.26 26 WEEKS GESTATION OF PREGNANCY: ICD-10-CM

## 2019-05-28 DIAGNOSIS — M89.9 PUBIC BONE PAIN: ICD-10-CM

## 2019-05-28 PROCEDURE — 97112 NEUROMUSCULAR REEDUCATION: CPT | Performed by: PHYSICAL THERAPIST

## 2019-05-28 PROCEDURE — 97110 THERAPEUTIC EXERCISES: CPT | Performed by: PHYSICAL THERAPIST

## 2019-05-28 PROCEDURE — 97140 MANUAL THERAPY 1/> REGIONS: CPT | Performed by: PHYSICAL THERAPIST

## 2019-05-29 ENCOUNTER — ROUTINE PRENATAL (OUTPATIENT)
Dept: OBGYN CLINIC | Facility: CLINIC | Age: 24
End: 2019-05-29

## 2019-05-29 VITALS — BODY MASS INDEX: 33.4 KG/M2 | DIASTOLIC BLOOD PRESSURE: 66 MMHG | WEIGHT: 182.6 LBS | SYSTOLIC BLOOD PRESSURE: 118 MMHG

## 2019-05-29 DIAGNOSIS — Z3A.33 33 WEEKS GESTATION OF PREGNANCY: Primary | ICD-10-CM

## 2019-05-29 PROCEDURE — PNV: Performed by: OBSTETRICS & GYNECOLOGY

## 2019-05-29 RX ORDER — FERROUS SULFATE TAB EC 324 MG (65 MG FE EQUIVALENT) 324 (65 FE) MG
324 TABLET DELAYED RESPONSE ORAL
Qty: 180 TABLET | Refills: 0 | Status: SHIPPED | OUTPATIENT
Start: 2019-05-29 | End: 2019-07-24 | Stop reason: SDUPTHER

## 2019-05-30 ENCOUNTER — APPOINTMENT (OUTPATIENT)
Dept: PHYSICAL THERAPY | Facility: REHABILITATION | Age: 24
End: 2019-05-30
Payer: COMMERCIAL

## 2019-06-04 ENCOUNTER — OFFICE VISIT (OUTPATIENT)
Dept: PHYSICAL THERAPY | Facility: REHABILITATION | Age: 24
End: 2019-06-04
Payer: COMMERCIAL

## 2019-06-04 DIAGNOSIS — M54.9 BACK PAIN AFFECTING PREGNANCY IN SECOND TRIMESTER: Primary | ICD-10-CM

## 2019-06-04 DIAGNOSIS — Z3A.26 26 WEEKS GESTATION OF PREGNANCY: ICD-10-CM

## 2019-06-04 DIAGNOSIS — O99.891 BACK PAIN AFFECTING PREGNANCY IN SECOND TRIMESTER: Primary | ICD-10-CM

## 2019-06-04 DIAGNOSIS — M89.9 PUBIC BONE PAIN: ICD-10-CM

## 2019-06-04 PROCEDURE — 97112 NEUROMUSCULAR REEDUCATION: CPT | Performed by: PHYSICAL THERAPIST

## 2019-06-04 PROCEDURE — 97140 MANUAL THERAPY 1/> REGIONS: CPT | Performed by: PHYSICAL THERAPIST

## 2019-06-11 ENCOUNTER — APPOINTMENT (OUTPATIENT)
Dept: PHYSICAL THERAPY | Facility: REHABILITATION | Age: 24
End: 2019-06-11
Payer: COMMERCIAL

## 2019-06-12 ENCOUNTER — ROUTINE PRENATAL (OUTPATIENT)
Dept: OBGYN CLINIC | Facility: CLINIC | Age: 24
End: 2019-06-12

## 2019-06-12 VITALS — BODY MASS INDEX: 34.02 KG/M2 | WEIGHT: 186 LBS | SYSTOLIC BLOOD PRESSURE: 132 MMHG | DIASTOLIC BLOOD PRESSURE: 70 MMHG

## 2019-06-12 DIAGNOSIS — Z34.83 ENCOUNTER FOR SUPERVISION OF OTHER NORMAL PREGNANCY, THIRD TRIMESTER: ICD-10-CM

## 2019-06-12 DIAGNOSIS — Z67.91 RH NEGATIVE STATE IN ANTEPARTUM PERIOD: ICD-10-CM

## 2019-06-12 DIAGNOSIS — O40.3XX0 POLYHYDRAMNIOS IN THIRD TRIMESTER COMPLICATION, SINGLE OR UNSPECIFIED FETUS: Primary | ICD-10-CM

## 2019-06-12 DIAGNOSIS — O26.899 RH NEGATIVE STATE IN ANTEPARTUM PERIOD: ICD-10-CM

## 2019-06-12 DIAGNOSIS — Z3A.35 35 WEEKS GESTATION OF PREGNANCY: ICD-10-CM

## 2019-06-12 PROCEDURE — PNV: Performed by: OBSTETRICS & GYNECOLOGY

## 2019-06-18 ENCOUNTER — OFFICE VISIT (OUTPATIENT)
Dept: PHYSICAL THERAPY | Facility: REHABILITATION | Age: 24
End: 2019-06-18
Payer: COMMERCIAL

## 2019-06-18 DIAGNOSIS — M54.9 BACK PAIN AFFECTING PREGNANCY IN SECOND TRIMESTER: Primary | ICD-10-CM

## 2019-06-18 DIAGNOSIS — M89.9 PUBIC BONE PAIN: ICD-10-CM

## 2019-06-18 DIAGNOSIS — O99.891 BACK PAIN AFFECTING PREGNANCY IN SECOND TRIMESTER: Primary | ICD-10-CM

## 2019-06-18 PROCEDURE — 97140 MANUAL THERAPY 1/> REGIONS: CPT | Performed by: PHYSICAL THERAPIST

## 2019-06-18 PROCEDURE — 97110 THERAPEUTIC EXERCISES: CPT | Performed by: PHYSICAL THERAPIST

## 2019-06-19 ENCOUNTER — OFFICE VISIT (OUTPATIENT)
Dept: FAMILY MEDICINE CLINIC | Facility: CLINIC | Age: 24
End: 2019-06-19
Payer: COMMERCIAL

## 2019-06-19 VITALS
SYSTOLIC BLOOD PRESSURE: 134 MMHG | BODY MASS INDEX: 34.41 KG/M2 | HEART RATE: 98 BPM | HEIGHT: 62 IN | RESPIRATION RATE: 16 BRPM | TEMPERATURE: 98.9 F | WEIGHT: 187 LBS | DIASTOLIC BLOOD PRESSURE: 80 MMHG | OXYGEN SATURATION: 99 %

## 2019-06-19 DIAGNOSIS — B07.8 OTHER VIRAL WARTS: Primary | ICD-10-CM

## 2019-06-19 PROCEDURE — RECHECK: Performed by: FAMILY MEDICINE

## 2019-06-19 PROCEDURE — 17110 DESTRUCTION B9 LES UP TO 14: CPT | Performed by: FAMILY MEDICINE

## 2019-06-25 ENCOUNTER — APPOINTMENT (OUTPATIENT)
Dept: PHYSICAL THERAPY | Facility: REHABILITATION | Age: 24
End: 2019-06-25
Payer: COMMERCIAL

## 2019-06-26 ENCOUNTER — PROCEDURE VISIT (OUTPATIENT)
Dept: FAMILY MEDICINE CLINIC | Facility: CLINIC | Age: 24
End: 2019-06-26
Payer: COMMERCIAL

## 2019-06-26 ENCOUNTER — ROUTINE PRENATAL (OUTPATIENT)
Dept: OBGYN CLINIC | Facility: CLINIC | Age: 24
End: 2019-06-26

## 2019-06-26 VITALS — WEIGHT: 189 LBS | SYSTOLIC BLOOD PRESSURE: 126 MMHG | BODY MASS INDEX: 34.57 KG/M2 | DIASTOLIC BLOOD PRESSURE: 70 MMHG

## 2019-06-26 VITALS
TEMPERATURE: 98.6 F | HEART RATE: 118 BPM | SYSTOLIC BLOOD PRESSURE: 132 MMHG | HEIGHT: 62 IN | DIASTOLIC BLOOD PRESSURE: 82 MMHG | BODY MASS INDEX: 34.41 KG/M2 | OXYGEN SATURATION: 98 % | WEIGHT: 187 LBS

## 2019-06-26 DIAGNOSIS — Z67.91 RH NEGATIVE STATE IN ANTEPARTUM PERIOD: ICD-10-CM

## 2019-06-26 DIAGNOSIS — Z3A.37 37 WEEKS GESTATION OF PREGNANCY: ICD-10-CM

## 2019-06-26 DIAGNOSIS — B07.8 OTHER VIRAL WARTS: Primary | ICD-10-CM

## 2019-06-26 DIAGNOSIS — Z34.03 ENCOUNTER FOR SUPERVISION OF NORMAL FIRST PREGNANCY IN THIRD TRIMESTER: Primary | ICD-10-CM

## 2019-06-26 DIAGNOSIS — O26.899 RH NEGATIVE STATE IN ANTEPARTUM PERIOD: ICD-10-CM

## 2019-06-26 PROCEDURE — 17110 DESTRUCTION B9 LES UP TO 14: CPT | Performed by: FAMILY MEDICINE

## 2019-06-26 PROCEDURE — PNV: Performed by: OBSTETRICS & GYNECOLOGY

## 2019-06-26 PROCEDURE — 87653 STREP B DNA AMP PROBE: CPT | Performed by: OBSTETRICS & GYNECOLOGY

## 2019-06-29 LAB — GP B STREP DNA SPEC QL NAA+PROBE: NORMAL

## 2019-07-02 ENCOUNTER — OFFICE VISIT (OUTPATIENT)
Dept: PHYSICAL THERAPY | Facility: REHABILITATION | Age: 24
End: 2019-07-02
Payer: COMMERCIAL

## 2019-07-02 DIAGNOSIS — M54.9 BACK PAIN AFFECTING PREGNANCY IN SECOND TRIMESTER: Primary | ICD-10-CM

## 2019-07-02 DIAGNOSIS — M89.9 PUBIC BONE PAIN: ICD-10-CM

## 2019-07-02 DIAGNOSIS — O99.891 BACK PAIN AFFECTING PREGNANCY IN SECOND TRIMESTER: Primary | ICD-10-CM

## 2019-07-02 PROCEDURE — 97140 MANUAL THERAPY 1/> REGIONS: CPT | Performed by: PHYSICAL THERAPIST

## 2019-07-02 PROCEDURE — 97110 THERAPEUTIC EXERCISES: CPT | Performed by: PHYSICAL THERAPIST

## 2019-07-02 NOTE — PROGRESS NOTES
Daily Note     Today's date: 2019  Patient name: Lyndsey Pritchard  : 1995  MRN: [id number]  Referring provider: Casimiro Vasquez DO  Dx:   Encounter Diagnosis     ICD-10-CM    1  Back pain affecting pregnancy in second trimester O99 89     M54 9    2  Pubic bone pain M89 9                   Subjective: Pt reports that she was doing well for about a week and half but had increased pelvic pain over the last several days  Overall states she is feeling "very pregnant" but feels good     Objective: See treatment diary below  Precautions: Pregnant, due date 19     Daily Treatment Diary      Manual      Adductor release [address]  screened [address]  Hakeem  HAKEEM HAKEEM HAKEEM   R side posterior muscle release  screened L sdie HAKEEM  R side HAKEEM  BL HAKEEM [address] BL  BL screened BL screened BL   External PF release [address]  NP           Hamstring stretch [address]  HAKEEM   [address] HAKEEM HAKEEM   Hip PROM [address] HAKEEM HAKEEM   Round lig release Sterling Mylar screened  HAKEEM         Exercise Diary     7   Pt ed on breathing               Pt ed on bowel positioning               Adductor stretch standing               Butterfly stretch               Biofeedback                glute activation               Squats with breathing    2x10 each  p-ball 2x10      p-ball 2x10   TA activation with marching    HEP           TA with BLKFO    HEP           Clamshells    HEP           Cat/cow    10 each  10 each  10 each  10 each 10 each 10 each    ball warm up            standing TA with march      20 each    20 each       Standing hip abduction     20 each            perineal massage demo/instrcution      10'          quad with arms          10 each      quad with legs          painful      biofeed back in birth positions          20'                                           Modalities                                                                                                      Assessment: Tolerated treatment well  Patient would benefit from continued PT  Pt presents with increased adductor tightness as compared to LV  She reported relief post treatment   Plan: Continue per plan of care

## 2019-07-03 ENCOUNTER — ROUTINE PRENATAL (OUTPATIENT)
Dept: OBGYN CLINIC | Facility: CLINIC | Age: 24
End: 2019-07-03

## 2019-07-03 VITALS — DIASTOLIC BLOOD PRESSURE: 84 MMHG | SYSTOLIC BLOOD PRESSURE: 124 MMHG | WEIGHT: 192 LBS | BODY MASS INDEX: 35.12 KG/M2

## 2019-07-03 DIAGNOSIS — Z34.83 ENCOUNTER FOR SUPERVISION OF OTHER NORMAL PREGNANCY, THIRD TRIMESTER: ICD-10-CM

## 2019-07-03 DIAGNOSIS — Z3A.38 38 WEEKS GESTATION OF PREGNANCY: ICD-10-CM

## 2019-07-03 DIAGNOSIS — O26.899 RH NEGATIVE STATE IN ANTEPARTUM PERIOD: ICD-10-CM

## 2019-07-03 DIAGNOSIS — Z67.91 RH NEGATIVE STATE IN ANTEPARTUM PERIOD: ICD-10-CM

## 2019-07-03 PROCEDURE — PNV: Performed by: OBSTETRICS & GYNECOLOGY

## 2019-07-03 NOTE — PROGRESS NOTES
30 y o    female at 38w0d EGA for PNV  BP : 124/84  TWlbs  Irregular contractions, lost some mucous   SVE:2 /-2

## 2019-07-08 ENCOUNTER — ROUTINE PRENATAL (OUTPATIENT)
Dept: OBGYN CLINIC | Facility: CLINIC | Age: 24
End: 2019-07-08

## 2019-07-08 VITALS — WEIGHT: 190 LBS | SYSTOLIC BLOOD PRESSURE: 120 MMHG | DIASTOLIC BLOOD PRESSURE: 70 MMHG | BODY MASS INDEX: 34.75 KG/M2

## 2019-07-08 DIAGNOSIS — Z3A.38 38 WEEKS GESTATION OF PREGNANCY: ICD-10-CM

## 2019-07-08 DIAGNOSIS — Z34.83 ENCOUNTER FOR SUPERVISION OF OTHER NORMAL PREGNANCY, THIRD TRIMESTER: ICD-10-CM

## 2019-07-08 DIAGNOSIS — O26.899 RH NEGATIVE STATE IN ANTEPARTUM PERIOD: ICD-10-CM

## 2019-07-08 DIAGNOSIS — Z67.91 RH NEGATIVE STATE IN ANTEPARTUM PERIOD: ICD-10-CM

## 2019-07-08 PROCEDURE — PNV: Performed by: OBSTETRICS & GYNECOLOGY

## 2019-07-08 NOTE — PROGRESS NOTES
Pt is having contractions and pressure  She is undressed for a check up  Pt's urine dip showed positive protein

## 2019-07-08 NOTE — PROGRESS NOTES
30 y o    female at 38w6 d EGA for PNV  BP : 120/70  TWlbs  Patient feeling a lot of pressure, pain in cervix  Reviewed s/s of labor  Discussed risks/benefits of IOL vs expectant management  Patient interested in IOL, scheduled for Mon 7/15 at 7am with Dr Rhona Severance  All questions answered

## 2019-07-09 ENCOUNTER — OFFICE VISIT (OUTPATIENT)
Dept: PHYSICAL THERAPY | Facility: REHABILITATION | Age: 24
End: 2019-07-09
Payer: COMMERCIAL

## 2019-07-09 DIAGNOSIS — M89.9 PUBIC BONE PAIN: ICD-10-CM

## 2019-07-09 DIAGNOSIS — M54.9 BACK PAIN AFFECTING PREGNANCY IN SECOND TRIMESTER: Primary | ICD-10-CM

## 2019-07-09 DIAGNOSIS — O99.891 BACK PAIN AFFECTING PREGNANCY IN SECOND TRIMESTER: Primary | ICD-10-CM

## 2019-07-09 PROCEDURE — 97110 THERAPEUTIC EXERCISES: CPT | Performed by: PHYSICAL THERAPIST

## 2019-07-09 PROCEDURE — 97140 MANUAL THERAPY 1/> REGIONS: CPT | Performed by: PHYSICAL THERAPIST

## 2019-07-09 NOTE — PROGRESS NOTES
Daily Note/Discharge    Today's date: 2019  Patient name: Shahab Woodward  : 1995  MRN: [id number]  Referring provider: Jose Caro DO  Dx:   Encounter Diagnosis     ICD-10-CM    1  Back pain affecting pregnancy in second trimester O99 89     M54 9    2  Pubic bone pain M89 9                   Subjective: Pt reports that she felt good after LV but has had trouble moving throughout the week  States she has elected for an induction date of 7/15     Objective: See treatment diary below  Precautions: Pregnant, due date 19     Daily Treatment Diary      Manual      Adductor release [address]  screened [address]  Hakeem [address] HAKEEM HAKEEM HAKEEM   R side posterior muscle release  screened L sdie HAKEEM  R side HAKEEM  BL HAKEEM [address] BL  BL screened BL screened BL HAKEEM   External PF release [address]  NP            Hamstring stretch [address]  HAKEEM   [address] Sammy Delgado HAKEEM   Hip PROM [address] Deitra Severe HAKEEM   Round lig release Johnspa Harder screened  Sammy Delgado         Exercise Diary      Pt ed on breathing                Pt ed on bowel positioning                Adductor stretch standing                Butterfly stretch                Biofeedback                 glute activation                Squats with breathing    2x10 each  p-ball 2x10      p-ball 2x10    TA activation with     HEP            TA with BLKFO    HEP            Clamshells    HEP            Cat/cow    10 each  10 each  10 each  10 each 10 each 10 each 10 each    ball warm up    4/4    4/4     3/3    standing TA with march      20 each    20 each        Standing hip abduction     20 each             perineal massage demo/instrcution      10'           quad with arms          10 each       quad with legs          painful       biofeed back in birth positions          20'                                              Modalities                                                                                              Assessment: Pt has met her goal of remaining functional throughout pregnancy  She is scheduled for induction on 7/15/19  Plan: Pt discharged from this episode of care  Pt to return as need post-partum

## 2019-07-15 ENCOUNTER — HOSPITAL ENCOUNTER (INPATIENT)
Facility: HOSPITAL | Age: 24
LOS: 2 days | Discharge: HOME | End: 2019-07-17
Attending: OBSTETRICS & GYNECOLOGY | Admitting: OBSTETRICS & GYNECOLOGY
Payer: COMMERCIAL

## 2019-07-15 ENCOUNTER — HOSPITAL ENCOUNTER (OUTPATIENT)
Dept: LABOR AND DELIVERY | Facility: HOSPITAL | Age: 24
Discharge: HOME | End: 2019-07-15
Payer: COMMERCIAL

## 2019-07-15 ENCOUNTER — ANESTHESIA EVENT (INPATIENT)
Dept: ANESTHESIOLOGY | Facility: HOSPITAL | Age: 24
End: 2019-07-15
Payer: COMMERCIAL

## 2019-07-15 ENCOUNTER — ANESTHESIA (INPATIENT)
Dept: ANESTHESIOLOGY | Facility: HOSPITAL | Age: 24
End: 2019-07-15
Payer: COMMERCIAL

## 2019-07-15 DIAGNOSIS — Z3A.35 35 WEEKS GESTATION OF PREGNANCY: Primary | ICD-10-CM

## 2019-07-15 PROBLEM — Z3A.39 39 WEEKS GESTATION OF PREGNANCY: Status: ACTIVE | Noted: 2019-06-12

## 2019-07-15 LAB
ABO GROUP BLD: NORMAL
ALBUMIN SERPL BCP-MCNC: 2.7 G/DL (ref 3.5–5)
ALP SERPL-CCNC: 248 U/L (ref 46–116)
ALT SERPL W P-5'-P-CCNC: 16 U/L (ref 12–78)
ANION GAP SERPL CALCULATED.3IONS-SCNC: 7 MMOL/L (ref 4–13)
AST SERPL W P-5'-P-CCNC: 47 U/L (ref 5–45)
BASE EXCESS BLDCOA CALC-SCNC: -4.8 MMOL/L (ref 3–11)
BASE EXCESS BLDCOV CALC-SCNC: -5 MMOL/L (ref 1–9)
BILIRUB SERPL-MCNC: 0.27 MG/DL (ref 0.2–1)
BLD GP AB SCN SERPL QL: POSITIVE
BLOOD GROUP ANTIBODIES SERPL: NORMAL
BUN SERPL-MCNC: 8 MG/DL (ref 5–25)
CALCIUM SERPL-MCNC: 8.5 MG/DL (ref 8.3–10.1)
CHLORIDE SERPL-SCNC: 109 MMOL/L (ref 100–108)
CO2 SERPL-SCNC: 21 MMOL/L (ref 21–32)
CREAT SERPL-MCNC: 0.63 MG/DL (ref 0.6–1.3)
ERYTHROCYTE [DISTWIDTH] IN BLOOD BY AUTOMATED COUNT: 19.1 % (ref 11.6–15.1)
GFR SERPL CREATININE-BSD FRML MDRD: 124 ML/MIN/1.73SQ M
GLUCOSE SERPL-MCNC: 112 MG/DL (ref 65–140)
HCO3 BLDCOA-SCNC: 21.7 MMOL/L (ref 17.3–27.3)
HCO3 BLDCOV-SCNC: 18.8 MMOL/L (ref 12.2–28.6)
HCT VFR BLD AUTO: 29.8 % (ref 34.8–46.1)
HGB BLD-MCNC: 8.8 G/DL (ref 11.5–15.4)
MCH RBC QN AUTO: 23.8 PG (ref 26.8–34.3)
MCHC RBC AUTO-ENTMCNC: 29.5 G/DL (ref 31.4–37.4)
MCV RBC AUTO: 81 FL (ref 82–98)
O2 CT VFR BLDCOA CALC: 9.7 ML/DL
OXYHGB MFR BLDCOA: 46.8 %
OXYHGB MFR BLDCOV: 58 %
PCO2 BLDCOA: 45.5 MM[HG] (ref 30–60)
PCO2 BLDCOV: 31.4 MM HG (ref 27–43)
PH BLDCOA: 7.3 [PH] (ref 7.23–7.43)
PH BLDCOV: 7.39 [PH] (ref 7.19–7.49)
PLATELET # BLD AUTO: 205 THOUSANDS/UL (ref 149–390)
PMV BLD AUTO: 13 FL (ref 8.9–12.7)
PO2 BLDCOA: 24 MM HG (ref 5–25)
PO2 BLDCOV: 26.1 MM HG (ref 15–45)
POTASSIUM SERPL-SCNC: 4.3 MMOL/L (ref 3.5–5.3)
PROT SERPL-MCNC: 6.3 G/DL (ref 6.4–8.2)
RBC # BLD AUTO: 3.7 MILLION/UL (ref 3.81–5.12)
RH BLD: NEGATIVE
RPR SER QL: NORMAL
SAO2 % BLDCOV: 11.4 ML/DL
SODIUM SERPL-SCNC: 137 MMOL/L (ref 136–145)
SPECIMEN EXPIRATION DATE: NORMAL
WBC # BLD AUTO: 8.68 THOUSAND/UL (ref 4.31–10.16)

## 2019-07-15 PROCEDURE — 85027 COMPLETE CBC AUTOMATED: CPT | Performed by: OBSTETRICS & GYNECOLOGY

## 2019-07-15 PROCEDURE — 59400 OBSTETRICAL CARE: CPT | Performed by: OBSTETRICS & GYNECOLOGY

## 2019-07-15 PROCEDURE — 80053 COMPREHEN METABOLIC PANEL: CPT | Performed by: OBSTETRICS & GYNECOLOGY

## 2019-07-15 PROCEDURE — 0KQM0ZZ REPAIR PERINEUM MUSCLE, OPEN APPROACH: ICD-10-PCS | Performed by: OBSTETRICS & GYNECOLOGY

## 2019-07-15 PROCEDURE — 86850 RBC ANTIBODY SCREEN: CPT | Performed by: OBSTETRICS & GYNECOLOGY

## 2019-07-15 PROCEDURE — 82805 BLOOD GASES W/O2 SATURATION: CPT | Performed by: OBSTETRICS & GYNECOLOGY

## 2019-07-15 PROCEDURE — 86900 BLOOD TYPING SEROLOGIC ABO: CPT | Performed by: OBSTETRICS & GYNECOLOGY

## 2019-07-15 PROCEDURE — 4A1HXCZ MONITORING OF PRODUCTS OF CONCEPTION, CARDIAC RATE, EXTERNAL APPROACH: ICD-10-PCS | Performed by: OBSTETRICS & GYNECOLOGY

## 2019-07-15 PROCEDURE — NC001 PR NO CHARGE: Performed by: OBSTETRICS & GYNECOLOGY

## 2019-07-15 PROCEDURE — 86922 COMPATIBILITY TEST ANTIGLOB: CPT

## 2019-07-15 PROCEDURE — 86870 RBC ANTIBODY IDENTIFICATION: CPT | Performed by: OBSTETRICS & GYNECOLOGY

## 2019-07-15 PROCEDURE — 86901 BLOOD TYPING SEROLOGIC RH(D): CPT | Performed by: OBSTETRICS & GYNECOLOGY

## 2019-07-15 PROCEDURE — 86921 COMPATIBILITY TEST INCUBATE: CPT

## 2019-07-15 PROCEDURE — 86592 SYPHILIS TEST NON-TREP QUAL: CPT | Performed by: OBSTETRICS & GYNECOLOGY

## 2019-07-15 RX ORDER — IBUPROFEN 600 MG/1
600 TABLET ORAL EVERY 6 HOURS PRN
Status: DISCONTINUED | OUTPATIENT
Start: 2019-07-15 | End: 2019-07-17 | Stop reason: HOSPADM

## 2019-07-15 RX ORDER — LIDOCAINE HYDROCHLORIDE 10 MG/ML
INJECTION, SOLUTION EPIDURAL; INFILTRATION; INTRACAUDAL; PERINEURAL
Status: COMPLETED
Start: 2019-07-15 | End: 2019-07-15

## 2019-07-15 RX ORDER — LIDOCAINE HYDROCHLORIDE AND EPINEPHRINE 15; 5 MG/ML; UG/ML
INJECTION, SOLUTION EPIDURAL
Status: COMPLETED | OUTPATIENT
Start: 2019-07-15 | End: 2019-07-15

## 2019-07-15 RX ORDER — SENNOSIDES 8.6 MG
1 TABLET ORAL DAILY
Status: DISCONTINUED | OUTPATIENT
Start: 2019-07-16 | End: 2019-07-17 | Stop reason: HOSPADM

## 2019-07-15 RX ORDER — TRANEXAMIC ACID 100 MG/ML
1000 INJECTION, SOLUTION INTRAVENOUS ONCE
Status: COMPLETED | OUTPATIENT
Start: 2019-07-15 | End: 2019-07-15

## 2019-07-15 RX ORDER — CALCIUM CARBONATE 200(500)MG
1000 TABLET,CHEWABLE ORAL DAILY PRN
Status: DISCONTINUED | OUTPATIENT
Start: 2019-07-15 | End: 2019-07-17 | Stop reason: HOSPADM

## 2019-07-15 RX ORDER — ONDANSETRON 2 MG/ML
4 INJECTION INTRAMUSCULAR; INTRAVENOUS EVERY 8 HOURS PRN
Status: DISCONTINUED | OUTPATIENT
Start: 2019-07-15 | End: 2019-07-17 | Stop reason: HOSPADM

## 2019-07-15 RX ORDER — SODIUM CHLORIDE, SODIUM LACTATE, POTASSIUM CHLORIDE, CALCIUM CHLORIDE 600; 310; 30; 20 MG/100ML; MG/100ML; MG/100ML; MG/100ML
125 INJECTION, SOLUTION INTRAVENOUS CONTINUOUS
Status: DISCONTINUED | OUTPATIENT
Start: 2019-07-15 | End: 2019-07-15

## 2019-07-15 RX ORDER — DIPHENHYDRAMINE HCL 25 MG
25 TABLET ORAL EVERY 6 HOURS PRN
Status: DISCONTINUED | OUTPATIENT
Start: 2019-07-15 | End: 2019-07-17 | Stop reason: HOSPADM

## 2019-07-15 RX ORDER — OXYCODONE HYDROCHLORIDE 5 MG/1
5 TABLET ORAL EVERY 4 HOURS PRN
Status: DISCONTINUED | OUTPATIENT
Start: 2019-07-15 | End: 2019-07-17 | Stop reason: HOSPADM

## 2019-07-15 RX ORDER — ONDANSETRON 2 MG/ML
4 INJECTION INTRAMUSCULAR; INTRAVENOUS EVERY 6 HOURS PRN
Status: DISCONTINUED | OUTPATIENT
Start: 2019-07-15 | End: 2019-07-15

## 2019-07-15 RX ORDER — CARBOPROST TROMETHAMINE 250 UG/ML
INJECTION, SOLUTION INTRAMUSCULAR
Status: DISCONTINUED
Start: 2019-07-15 | End: 2019-07-15 | Stop reason: WASHOUT

## 2019-07-15 RX ORDER — LIDOCAINE HYDROCHLORIDE 10 MG/ML
30 INJECTION, SOLUTION EPIDURAL; INFILTRATION; INTRACAUDAL; PERINEURAL ONCE
Status: COMPLETED | OUTPATIENT
Start: 2019-07-15 | End: 2019-07-15

## 2019-07-15 RX ORDER — BUTORPHANOL TARTRATE 1 MG/ML
1 INJECTION, SOLUTION INTRAMUSCULAR; INTRAVENOUS ONCE
Status: DISCONTINUED | OUTPATIENT
Start: 2019-07-15 | End: 2019-07-15

## 2019-07-15 RX ORDER — DIAPER,BRIEF,INFANT-TODD,DISP
1 EACH MISCELLANEOUS AS NEEDED
Status: DISCONTINUED | OUTPATIENT
Start: 2019-07-15 | End: 2019-07-17 | Stop reason: HOSPADM

## 2019-07-15 RX ORDER — METHYLERGONOVINE MALEATE 0.2 MG/ML
INJECTION INTRAVENOUS
Status: DISCONTINUED
Start: 2019-07-15 | End: 2019-07-15 | Stop reason: WASHOUT

## 2019-07-15 RX ORDER — OXYTOCIN/RINGER'S LACTATE 30/500 ML
1-30 PLASTIC BAG, INJECTION (ML) INTRAVENOUS
Status: DISCONTINUED | OUTPATIENT
Start: 2019-07-15 | End: 2019-07-15

## 2019-07-15 RX ORDER — SIMETHICONE 80 MG
80 TABLET,CHEWABLE ORAL 4 TIMES DAILY PRN
Status: DISCONTINUED | OUTPATIENT
Start: 2019-07-15 | End: 2019-07-17 | Stop reason: HOSPADM

## 2019-07-15 RX ORDER — KETOROLAC TROMETHAMINE 30 MG/ML
30 INJECTION, SOLUTION INTRAMUSCULAR; INTRAVENOUS ONCE
Status: COMPLETED | OUTPATIENT
Start: 2019-07-15 | End: 2019-07-15

## 2019-07-15 RX ORDER — ALBUTEROL SULFATE 90 UG/1
2 AEROSOL, METERED RESPIRATORY (INHALATION) EVERY 6 HOURS PRN
Status: DISCONTINUED | OUTPATIENT
Start: 2019-07-15 | End: 2019-07-17 | Stop reason: HOSPADM

## 2019-07-15 RX ORDER — ACETAMINOPHEN 325 MG/1
650 TABLET ORAL EVERY 4 HOURS PRN
Status: DISCONTINUED | OUTPATIENT
Start: 2019-07-15 | End: 2019-07-17 | Stop reason: HOSPADM

## 2019-07-15 RX ORDER — BISACODYL 10 MG
10 SUPPOSITORY, RECTAL RECTAL DAILY PRN
Status: DISCONTINUED | OUTPATIENT
Start: 2019-07-15 | End: 2019-07-17 | Stop reason: HOSPADM

## 2019-07-15 RX ORDER — OXYTOCIN/RINGER'S LACTATE 30/500 ML
62.5 PLASTIC BAG, INJECTION (ML) INTRAVENOUS ONCE
Status: COMPLETED | OUTPATIENT
Start: 2019-07-15 | End: 2019-07-16

## 2019-07-15 RX ADMIN — BENZOCAINE AND LEVOMENTHOL: 200; 5 SPRAY TOPICAL at 20:42

## 2019-07-15 RX ADMIN — SODIUM CHLORIDE, SODIUM LACTATE, POTASSIUM CHLORIDE, AND CALCIUM CHLORIDE 125 ML/HR: .6; .31; .03; .02 INJECTION, SOLUTION INTRAVENOUS at 08:00

## 2019-07-15 RX ADMIN — HYDROCORTISONE 1 APPLICATION: 1 CREAM TOPICAL at 20:42

## 2019-07-15 RX ADMIN — WITCH HAZEL 1 PAD: 500 SOLUTION RECTAL; TOPICAL at 20:42

## 2019-07-15 RX ADMIN — KETOROLAC TROMETHAMINE 30 MG: 30 INJECTION, SOLUTION INTRAMUSCULAR at 18:16

## 2019-07-15 RX ADMIN — LIDOCAINE HYDROCHLORIDE 300 MG: 10 INJECTION, SOLUTION EPIDURAL; INFILTRATION; INTRACAUDAL; PERINEURAL at 18:19

## 2019-07-15 RX ADMIN — SODIUM CHLORIDE, SODIUM LACTATE, POTASSIUM CHLORIDE, AND CALCIUM CHLORIDE 125 ML/HR: .6; .31; .03; .02 INJECTION, SOLUTION INTRAVENOUS at 12:05

## 2019-07-15 RX ADMIN — SODIUM CHLORIDE, SODIUM LACTATE, POTASSIUM CHLORIDE, AND CALCIUM CHLORIDE 125 ML/HR: .6; .31; .03; .02 INJECTION, SOLUTION INTRAVENOUS at 16:22

## 2019-07-15 RX ADMIN — TRANEXAMIC ACID 1000 MG: 1 INJECTION, SOLUTION INTRAVENOUS at 18:09

## 2019-07-15 RX ADMIN — OXYCODONE HYDROCHLORIDE 5 MG: 5 TABLET ORAL at 20:38

## 2019-07-15 RX ADMIN — ROPIVACAINE HYDROCHLORIDE: 2 INJECTION, SOLUTION EPIDURAL; INFILTRATION at 11:45

## 2019-07-15 RX ADMIN — Medication 62.5 MILLI-UNITS/MIN: at 20:40

## 2019-07-15 RX ADMIN — Medication 2 MILLI-UNITS/MIN: at 08:59

## 2019-07-15 RX ADMIN — LIDOCAINE HYDROCHLORIDE AND EPINEPHRINE 5 ML: 15; 5 INJECTION, SOLUTION EPIDURAL at 11:45

## 2019-07-15 RX ADMIN — Medication 250 MILLI-UNITS/MIN: at 18:40

## 2019-07-15 NOTE — PLAN OF CARE
Problem: Knowledge Deficit  Goal: Verbalizes understanding of labor plan  Description  Assess patient/family/caregiver's baseline knowledge level and ability to understand information  Provide education via patient/family/caregiver's preferred learning method at appropriate level of understanding  1  Provide teaching at level of understanding  2  Provide teaching via preferred learning method(s)  7/15/2019 1922 by Layo Cunningham RN  Outcome: Completed  7/15/2019 0811 by Layo Cunningham RN  Outcome: Progressing     Problem: Labor & Delivery  Goal: Manages discomfort  Description  Assess and monitor for signs and symptoms of discomfort  Assess patient's pain level regularly and per hospital policy  Administer medications as ordered  Support use of nonpharmacological methods to help control pain such as distraction, imagery, relaxation, and application of heat and cold  Collaborate with interdisciplinary team and patient to determine appropriate pain management plan  1  Include patient in decisions related to comfort  2  Offer non-pharmacological pain management interventions  3  Report ineffective pain management to physician  7/15/2019 1922 by Layo Cunningham RN  Outcome: Completed  7/15/2019 0811 by Layo Cunningham RN  Outcome: Progressing  Goal: Patient vital signs are stable  Description  1  Assess vital signs - vaginal delivery    7/15/2019 1922 by Layo Cunningham RN  Outcome: Completed  7/15/2019 0811 by Layo Cunningham RN  Outcome: Progressing

## 2019-07-15 NOTE — ANESTHESIA PROCEDURE NOTES
Epidural Block    Patient location during procedure: OB  Start time: 7/15/2019 11:44 AM  Reason for block: procedure for pain, at surgeon's request and primary anesthetic  Staffing  Anesthesiologist: Garcia Rocha MD  Performed: anesthesiologist   Preanesthetic Checklist  Completed: patient identified, site marked, surgical consent, pre-op evaluation, timeout performed, IV checked, risks and benefits discussed and monitors and equipment checked  Epidural  Patient position: sitting  Prep: ChloraPrep and site prepped and draped  Patient monitoring: continuous pulse ox, frequent blood pressure checks and heart rate  Approach: midline  Location: lumbar (1-5)  Injection technique: LUIS MANUEL saline  Needle  Needle type: Tuohy   Epidural needle gauge: 17   Catheter type: side hole  Catheter size: 19   Catheter at skin depth: 11 cm  Test dose: negativelidocaine 1 5% with epinephrine 1:200,000 test dose, 5 mL  Assessment  Events: paresthesia and paresthesia immediately resolved after catheter placementnegative aspiration for CSF, negative aspiration for heme and no paresthesia on injection  patient tolerated the procedure well with no immediate complications

## 2019-07-15 NOTE — H&P
History & Physical - OB/GYN   Geetha Cordova 30 y o  female MRN: [id number]  Unit/Bed#: -01 Encounter: [id number]    Anika Zaldivar is a patient of [facility]    Chief complaint:  "I'm here for the induction"    HPI:  Anika Zaldivar is a 30 y o   female with an AMY of 2019, by Ultrasound at 39w5d weeks gestation who is being admitted for Induction of labor, elective  She was last 3- in the office  Today she thinks at around 0530 that she may have broken her water as she felt leakage of fluid after getting up out of bed to use the restroom  On exam today membranes are present around the fetal head, she has a contraction pattern every 3-4min and is mildly uncomfortable, SVE 3-4/80/-1      Contractions:  present  Fetal movement:  present  Vaginal bleeding:   none  Leaking of fluid:  Present per pt    Pregnancy Complications:  Patient Active Problem List   Diagnosis    Vitamin D deficiency    Anxiety    Chronic gastritis without bleeding    Exercise-induced asthma    Chronic fatigue    Elevated LFTs    Rh negative state in antepartum period    Encounter for supervision of other normal pregnancy, third trimester    Back pain affecting pregnancy in second trimester    High risk pregnancy with high inhibin    Polyhydramnios in third trimester    39 weeks gestation of pregnancy     PMH:  Past Medical History:   Diagnosis Date    Anemia     Anxiety     stopped medication over 1 year ago    Asthma     prn inhaler, excercise induced    Carpal tunnel syndrome during pregnancy     right wrist , no brace    Depression     Esophagitis     Gastritis     IBS (irritable bowel syndrome)     Pelvic pain affecting pregnancy     seeing a Physical therapist    Pneumonia     Varicella     had chicken pox as child/immune -blood work levels done 2018    Vitamin D deficiency     Warts     hands, legs       PSH:  Past Surgical History:   Procedure Laterality Date    TX COLONOSCOPY FLX DX W/COLLJ SPEC WHEN PFRMD N/A 2017    Procedure: EGD AND COLONOSCOPY;  Surgeon: Radha Her MD;  [facility]: [facility]; Service: Gastroenterology    SKIN GRAFT Left     left hand    WISDOM TOOTH EXTRACTION         Social Hx:  Denies EtOH, cigarette, and recreational drug use in pregnancy    OB Hx:  OB History    Para Term  AB Living   1 0 0 0 0 0   SAB TAB Ectopic Multiple Live Births   0 0 0 0 0      # Outcome Date GA Lbr Nathan/2nd Weight Sex Delivery Anes PTL Lv   1 Current               Obstetric Comments   Polyhydramnios, anemia, elevated inhibin levels       Meds:  No current facility-administered medications on file prior to encounter  Current Outpatient Medications on File Prior to Encounter   Medication Sig Dispense Refill    albuterol (PROVENTIL HFA,VENTOLIN HFA) 90 mcg/act inhaler Inhale 2 puffs every 6 (six) hours as needed for wheezing      ferrous sulfate 324 (65 Fe) mg Take 1 tablet (324 mg total) by mouth 2 (two) times a day before meals for 90 days 180 tablet 0    Prenatal MV-Min-FA-Omega-3 (PRENATAL GUMMIES/DHA & FA) 0 4-32 5 MG CHEW Chew 2 tablets daily         Allergies:  No Known Allergies    Review of Systems    Physical Exam    Cervix:  Dilation: 3-4  Effacement (%): 80  Station: -1    Fetal heart rate:   Baseline Rate: 130 bpm  Variability: Moderate 6-25 bpm  Accelerations: 15 x 15 or greater, With fetal movment, At variable times  FHR Category: Category I    [place]:   Contraction Frequency (minutes): 3  Contraction Duration (seconds): 60  Contraction Quality: Mild    EFW: 7-8 lb     Membranes: intact    Labs:  Lab Results   Component Value Date    WBC 8 68 07/15/2019    HGB 8 8 (L) 07/15/2019    HCT 29 8 (L) 07/15/2019    MCV 81 (L) 07/15/2019     07/15/2019       Blood type: O-  Antibody: negative  Group B strep: negative  HIV: negative  Hepatitis B: negative  RPR: non-reactive   Rubella: Immune  Varicella Unknown  1 hour Glucose: normal    Assessment:   30 y o   at 39w5d admit for eIOL    Plan:   1  IUP at 39w5d   - Continuous FHR and tocometry monitoring   - IOL pitocin titration, plan for rupture and epidural for augmentation  2  GBS neg  3  Anemia   - Hgb 8 8, consider T&C for 2u, currently asymptomatic no vaginal bleeding  4  Routine antepartum labs   - CBC, RPR, T&S stat  5  Analgesia   - Epidural per patient request  6   FEN   - Clear liquid diet, IV LRN for hydration    Myer Spatz, DO  PGY-3 OB/GYN   7/15/2019 8:46 AM

## 2019-07-15 NOTE — OB LABOR/OXYTOCIN SAFETY PROGRESS
Labor Progress Note - Geetha Cordova 30 y o  female MRN: [id number]    Unit/Bed#: -01 Encounter: [id number]    OB History    Para Term  AB Living   1 0 0 0 0 0   SAB TAB Ectopic Multiple Live Births   0 0 0 0 0   Obstetric Comments   Polyhydramnios, anemia, elevated inhibin levels     Gestational Age: 39w5d  Dose ([place]-units/min) Oxytocin: 8 [place]-units/min  Contraction Frequency (minutes):  2(indeterminant at times)  Contraction Quality: Moderate  Tachysystole: No   Dilation: 3        Effacement (%): 80  Station: -1  Baseline Rate: 145 bpm  Fetal Heart Rate: 151 BPM  FHR Category: Category I          Notes/comments:   Pt feeling more discomfort  AROM at this check   Pt considering epidural but unsure  Making cervical change to 4cm   FHT: Category I, ctx q2min, will continue to monitor   Attending aware           America Ivey 7/15/2019 11:10 AM

## 2019-07-15 NOTE — L&D DELIVERY NOTE
Vaginal Delivery Summary - OB/GYN   Davina Eason 30 y o  female MRN: [id number]  Unit/Bed#: -01 Encounter: [id number]          Predelivery Diagnosis:  1  Pregnancy at 39w5d   2  asthma    Postdelivery Diagnosis:  1  Same as above  2  Delivery of term     Procedure: Spontaneous Vaginal Delivery, repair of second degree perineal laceration    Attending: Tiney Cooks    Assistant: Ramses    Anesthesia: Epidural    QBL: 1366cc  Admission H 8  Admission platelets: [phone]    Complications: none apparent    Specimens: cord blood, arterial and venous cord blood gasses, placenta to storage    Findings:   1  Viable female at 1750, with APGARS of 9 and 9 at 1 and 5 minutes respectively,  2  Spontaneous delivery of intact placenta at 1800  3  2 degree laceration repaired with 3-0 Vicryl Rapide  4  Blood gases:    Umbilical Cord Venous Blood Gas:  Results from last 7 days   Lab Units 07/15/19  1754   PH COV  7 394   PCO2 COV mm HG 31 4   HCO3 COV mmol/L 18 8   BASE EXC COV mmol/L -5 0*   O2 CT CD VB mL/dL 11 4   O2 HGB, VENOUS CORD % 65 6     Umbilical Cord Arterial Blood Gas:  Results from last 7 days   Lab Units 07/15/19  1754   PH COA  7 297   PCO2 COA  45 5   PO2 COA mm HG 24 0   HCO3 COA mmol/L 21 7   BASE EXC COA mmol/L -4 8*   O2 CONTENT CORD ART ml/dl 9 7   O2 HGB, ARTERIAL CORD % 46 8       Disposition:  Patient tolerated the procedure well and was recovering in labor and delivery room     Brief history and labor course:  Ms Davina Eason is a 30 y o   at 39wk5d with a history of asthma and anemia  She presented to labor and delivery for elective induction of labor  She underwent induction with pitocin, and progressed to fully dilated at 1601  Description of procedure    Warm compresses were applied during pushing and perineal massage was performed  After pushing for 83 minutes, at 1750 patient delivered a viable female , wt pending, apgars of 9 (1 min) and 9 (5 min)   The fetal vertex delivered spontaneously  Baby was checked for nuchal  None present  The anterior shoulder delivered atraumatically with maternal expulsive forces and the assistance of gentle downward traction, with the left hand visible next to the posterior shoulder  The posterior shoulder delivered with maternal expulsive forces and the assistance of gentle upward traction  The remainder of the fetus delivered spontaneously  Upon delivery, the infant was placed on the mothers abdomen and the cord was clamped and cut  Delayed cord clamping was performed  The infant was noted to cry spontaneously and had all signs of life  There was no evidence for injury  Awaiting nurse resuscitators evaluated the   Arterial and venous cord blood gases and cord blood was collected for analysis  These were promptly sent to the lab  In the immediate post-partum, 30 units of IV pitocin was administered, and the uterus was noted to contract down well with massage and pitocin  The placenta delivered spontaneously at 1800 and was noted to have a centrally inserted 3 vessel cord  The vagina, cervix, perineum, and rectum were inspected and there was noted to be a second degree laceration  At this time, the lochia was noted to increase, and fundal massage was restarted  Pitocin rate was increased, and TXA was administered  Lower uterine segment contracted down with these measures and hemostasis was achieved  The apices of the vaginal laceration were identified and a suture of 3-0 Vicryl was placed 1cm above each apex  The vaginal mucosa and underlying rectovaginal fascia were closed using a running locked suture to the hymenal ring  The suture was then brought underneath the hymenal ring  A stitch was then placed through the bulbocavernosus muscle  Continuing with the same suture, the transverse perineal muscles were re-approximated   The suture was brought to the posterior apex of the skin laceration and then the skin was re-approximated in a subcuticular fashion to the hymenal ring  Hemostasis was achieved  At the conclusion of the procedure, all needle, sponge, and instrument counts were noted to be correct  Patient tolerated the procedure well and was allowed to recover in labor and delivery room with family and  before being transferred to the post-partum floor  Dr Mellody Buerger was present and participated in all key portions of the case          Tyler Zapata MD  7/15/2019  6:55 PM

## 2019-07-15 NOTE — OB LABOR/OXYTOCIN SAFETY PROGRESS
Oxytocin Safety Progress Check Note - Geetha Cordova 30 y o  female MRN: [id number]    Unit/Bed#: -01 Encounter: [id number]    OB History    Para Term  AB Living   1 0 0 0 0 0   SAB TAB Ectopic Multiple Live Births   0 0 0 0 0   Obstetric Comments   Polyhydramnios, anemia, elevated inhibin levels     Gestational Age: 39w5d  Dose ([place]-units/min) Oxytocin: 14 [place]-units/min  Contraction Frequency (minutes):   5-3  Contraction Quality: Moderate  Tachysystole: No   Dilation: 10  Dilation Complete Date: 07/15/19  Dilation Complete Time: 1602  Effacement (%): 100  Station: 1  Baseline Rate: 125 bpm  Fetal Heart Rate: 120 BPM  FHR Category: Category I          Notes/comments:   Feeling more pressure - complete, + 1  Will allow for team to assemble delivery cart then start pushing  Comfortable overall with epidural  Cat 1 tracing          Sindhu Bell DO 7/15/2019 4:02 PM

## 2019-07-15 NOTE — ANESTHESIA PREPROCEDURE EVALUATION
Bloating, Abdominal Pain, Finished Prep at 2030 last night  Review of Systems/Medical History  Patient summary reviewed  Chart reviewed  No history of anesthetic complications     Cardiovascular  Negative cardio ROS Exercise tolerance (METS): >4,  No NELSON,    Pulmonary  Not a smoker , Asthma , seasonal/exercise induced Last rescue: > 1 year ago Asthma type of rescue: PRN inhaler, No recent URI ,        GI/Hepatic      Comment: gastritis          Endo/Other    Obesity    GYN  Currently pregnant ,          Hematology  Anemia ,     Musculoskeletal       Neurology   Psychology   Anxiety, Depression ,              Physical Exam    Airway    Mallampati score: I  TM Distance: >3 FB  Neck ROM: full     Dental   No notable dental hx     Cardiovascular  Comment: Negative ROS, Rhythm: regular, Rate: normal, Cardiovascular exam normal    Pulmonary  Pulmonary exam normal Breath sounds clear to auscultation,     Other Findings        Anesthesia Plan  ASA Score- 2     Anesthesia Type- epidural with ASA Monitors  Additional Monitors:   Airway Plan:         Plan Factors-    Induction-     Postoperative Plan-     Informed Consent- Anesthetic plan and risks discussed with patient

## 2019-07-15 NOTE — OB LABOR/OXYTOCIN SAFETY PROGRESS
Labor Progress Note - Geetha Cordova 30 y o  female MRN: [id number]    Unit/Bed#: -01 Encounter: [id number]    OB History    Para Term  AB Living   1 0 0 0 0 0   SAB TAB Ectopic Multiple Live Births   0 0 0 0 0   Obstetric Comments   Polyhydramnios, anemia, elevated inhibin levels     Gestational Age: 39w5d  Dose ([place]-units/min) Oxytocin: 12 [place]-units/min  Contraction Frequency (minutes): 1 5-2  Contraction Quality: Moderate  Tachysystole: No   Dilation: 6-7        Effacement (%): 90  Station: -1  Baseline Rate: 125 bpm  Fetal Heart Rate: 173 BPM  FHR Category: Category I          Notes/comments:   Pt making cervical change to 6-7 cm  Pt comfortable on epidural   FHT: Category I, ctx q1-2min, will continue to monitor    Attending aware  Will recheck in 1-2 hours, sooner if pt uncomfortable          Karis Armstrong 7/15/2019 2:04 PM

## 2019-07-16 ENCOUNTER — APPOINTMENT (OUTPATIENT)
Dept: PHYSICAL THERAPY | Facility: REHABILITATION | Age: 24
End: 2019-07-16
Payer: COMMERCIAL

## 2019-07-16 LAB
ERYTHROCYTE [DISTWIDTH] IN BLOOD BY AUTOMATED COUNT: 18.9 % (ref 11.6–15.1)
HCT VFR BLD AUTO: 22.8 % (ref 34.8–46.1)
HGB BLD-MCNC: 6.7 G/DL (ref 11.5–15.4)
MCH RBC QN AUTO: 23.6 PG (ref 26.8–34.3)
MCHC RBC AUTO-ENTMCNC: 29.4 G/DL (ref 31.4–37.4)
MCV RBC AUTO: 80 FL (ref 82–98)
PLATELET # BLD AUTO: 153 THOUSANDS/UL (ref 149–390)
PMV BLD AUTO: 12.5 FL (ref 8.9–12.7)
RBC # BLD AUTO: 2.84 MILLION/UL (ref 3.81–5.12)
WBC # BLD AUTO: 11.74 THOUSAND/UL (ref 4.31–10.16)

## 2019-07-16 PROCEDURE — 99024 POSTOP FOLLOW-UP VISIT: CPT | Performed by: OBSTETRICS & GYNECOLOGY

## 2019-07-16 PROCEDURE — 85027 COMPLETE CBC AUTOMATED: CPT | Performed by: OBSTETRICS & GYNECOLOGY

## 2019-07-16 RX ORDER — DOCUSATE SODIUM 100 MG/1
100 CAPSULE, LIQUID FILLED ORAL 2 TIMES DAILY PRN
Status: DISCONTINUED | OUTPATIENT
Start: 2019-07-16 | End: 2019-07-17 | Stop reason: HOSPADM

## 2019-07-16 RX ORDER — FERROUS SULFATE 325(65) MG
325 TABLET ORAL 2 TIMES DAILY WITH MEALS
Status: DISCONTINUED | OUTPATIENT
Start: 2019-07-16 | End: 2019-07-17 | Stop reason: HOSPADM

## 2019-07-16 RX ADMIN — ACETAMINOPHEN 650 MG: 325 TABLET ORAL at 19:54

## 2019-07-16 RX ADMIN — IBUPROFEN 600 MG: 600 TABLET ORAL at 16:23

## 2019-07-16 RX ADMIN — DOCUSATE SODIUM 100 MG: 100 CAPSULE, LIQUID FILLED ORAL at 19:54

## 2019-07-16 RX ADMIN — ACETAMINOPHEN 650 MG: 325 TABLET ORAL at 04:45

## 2019-07-16 RX ADMIN — ACETAMINOPHEN 650 MG: 325 TABLET ORAL at 10:40

## 2019-07-16 RX ADMIN — IBUPROFEN 600 MG: 600 TABLET ORAL at 22:40

## 2019-07-16 RX ADMIN — IBUPROFEN 600 MG: 600 TABLET ORAL at 06:14

## 2019-07-16 RX ADMIN — IBUPROFEN 600 MG: 600 TABLET ORAL at 00:05

## 2019-07-16 RX ADMIN — Medication 325 MG: at 10:41

## 2019-07-16 RX ADMIN — SENNOSIDES 8.6 MG: 8.6 TABLET, FILM COATED ORAL at 10:41

## 2019-07-16 RX ADMIN — Medication 325 MG: at 16:30

## 2019-07-16 NOTE — DISCHARGE SUMMARY
Discharge Summary - Carlos Eduardo Rojo 30 y o  female MRN: [id number]    Unit/Bed#: -01 Encounter: [id number]    Admission Date: 7/15/2019     Discharge Date: 19    Admitting Diagnosis:   Patient Active Problem List   Diagnosis    Vitamin D deficiency    Anxiety    Chronic gastritis without bleeding    Exercise-induced asthma    Chronic fatigue    Elevated LFTs    Rh negative state in antepartum period    Encounter for supervision of other normal pregnancy, third trimester    Back pain affecting pregnancy in second trimester    High risk pregnancy with high inhibin    Polyhydramnios in third trimester    39 weeks gestation of pregnancy     (spontaneous vaginal delivery)       Discharge Diagnosis:   Same, delivered    Procedures:   spontaneous vaginal delivery    Admitting Attending: Dr Johanne Decker DO  Delivery Attending: Dr Judith Najera DO  Discharge Attending: Dr Judith Najera DO    Hospital Course:     Carlos Eduardo Rojo is a 30 y o  [facility] [place] who was admitted for induction of labor  Pregnancy was complicated by anemia and polyhydramnios  She underwent induction with pitocin and progressed to fully dilated and started pushing  She was noted to have several elevated blood pressures to the 160s during labor, which resolved without intervention  Given these pressures, she was diagnosed with gestational hypertension  She then underwent an uncomplicated spontaneous vaginal delivery and delivered a viable female  at 56 on 7/15/19  APGARS were 9, 9 at 1 and 5 minutes, respectively   weighed 8lb 0oz   was then transferred to  nursery  Postoperatively, patient was noted to have decreased uterine tone in the lower uterine segment, and was treated with TXA, pitocin, and uterine massage, with good improvement in tone and hemostasis  QBL was 1096mL  Patient tolerated the procedure well and was transferred to postpartum in stable condition       The patient's post partum course was remarkable for anemia and gHTN  Her hemoglobin dropped from 8 8 on admission to 6 7 postpartum  She was asymptomatic, able to ambulate without difficulty  Her systolic blood pressures remained under 150 postpartum  On day of discharge, she was ambulating and able to reasonably perform all ADLs  She was voiding and had appropriate bowel function  Pain was well controlled  She was discharged home on postpartum day #2 without complications  Patient was instructed to follow up with her OB as an outpatient and was given appropriate warnings to call provider if she develops signs of infection or uncontrolled pain  Plan to have her follow up in 1 week for blood pressure check and to use BID Fe supplementation for anemia  Condition at discharge:   fair     Disposition:   Home    Planned Readmission:   No    Discharge Medications:   Prenatal vitamin daily for 6 months or the duration of nursing whichever is longer  Motrin 600 mg orally every 6 hours as needed for pain  Tylenol (over the counter) per bottle directions as needed for pain  Hydrocortisone cream 1% (over the counter) applied 1-2x daily to hemorrhoids as needed  Witch hazel pads for hemorrhoidal discomfort as needed  Fe 324mg BID  Colace 100mg BID PRN constipation  Albuterol inhaler PRN    Discharge instructions :   -Do not place anything (no partner, tampons or douche) in your vagina for 6 weeks  -You may walk for exercise for the first 6 weeks then gradually return to your usual activities    -Please do not drive for 1 week if you have no stitches and for 2 weeks if you have stitches or underwent a  delivery     -You may take baths or shower per your preference    -Please look at your bust (breasts) in the mirror daily and call provider for redness or tenderness or increased warmth     - If you have had a  please look at your incision daily as well and call provider for increasing redness or steady drainage from the incision    -Please call your provider if temperature > 100 4*F or 38* C, worsening pain or a foul discharge

## 2019-07-16 NOTE — PLAN OF CARE
Problem: ALTERATION IN THE BREAST  Goal: Optimize infant feeding at the breast  Description  INTERVENTIONS:  - Latch, breast and nipple assessment  - Assess prior breast feeding history  - Hand expression of breast milk  - For cracked, bleeding and or sore nipples reassess latch, treat damaged nipple  -Educate mother on feeding cues  -Positioning/latch techniques  Outcome: Progressing     Problem: INADEQUATE LATCH, SUCK OR SWALLOW  Goal: Demonstrate ability to latch and sustain latch, audible swallowing and satiety  Description  INTERVENTIONS:  - Assess oral anatomy, notify Physician/AP for abnormal findings  - Establish milk expression  - Maximize feeding opportunity (skin to skin, behavioral state)  - Position/latch techniques  - Discourage use of pacifier-artificial nipple  - Mechanical pumping  - Nipple Shield  - Supplemental formula feeding (Physician/AP order)  - Alternative feeding method  Outcome: Progressing     Problem: PAIN - ADULT  Goal: Verbalizes/displays adequate comfort level or baseline comfort level  Description  Interventions:  - Encourage patient to monitor pain and request assistance  - Assess pain using appropriate pain scale  - Administer analgesics based on type and severity of pain and evaluate response  - Implement non-pharmacological measures as appropriate and evaluate response  - Consider cultural and social influences on pain and pain management  - Notify physician/advanced practitioner if interventions unsuccessful or patient reports new pain  Outcome: Progressing     Problem: INFECTION - ADULT  Goal: Absence or prevention of progression during hospitalization  Description  INTERVENTIONS:  - Assess and monitor for signs and symptoms of infection  - Monitor lab/diagnostic results  - Monitor all insertion sites, i e  indwelling lines, tubes, and drains  - Monitor endotracheal (as able) and nasal secretions for changes in amount and color  - [place] appropriate cooling/warming therapies per order  - Administer medications as ordered  - Instruct and encourage patient and family to use good hand hygiene technique  - Identify and instruct in appropriate isolation precautions for identified infection/condition  Outcome: Progressing  Goal: Absence of fever/infection during neutropenic period  Description  INTERVENTIONS:  - Monitor WBC  - Implement neutropenic guidelines  Outcome: Progressing     Problem: SAFETY ADULT  Goal: Patient will remain free of falls  Description  INTERVENTIONS:  - Assess patient frequently for physical needs  -  Identify cognitive and physical deficits and behaviors that affect risk of falls    -  [place] fall precautions as indicated by assessment   - Educate patient/family on patient safety including physical limitations  - Instruct patient to call for assistance with activity based on assessment  - Modify environment to reduce risk of injury  - Consider OT/PT consult to assist with strengthening/mobility  Outcome: Progressing  Goal: Maintain or return to baseline ADL function  Description  INTERVENTIONS:  -  Assess patient's ability to carry out ADLs; assess patient's baseline for ADL function and identify physical deficits which impact ability to perform ADLs (bathing, care of mouth/teeth, toileting, grooming, dressing, etc )  - Assess/evaluate cause of self-care deficits   - Assess range of motion  - Assess patient's mobility; develop plan if impaired  - Assess patient's need for assistive devices and provide as appropriate  - Encourage maximum independence but intervene and supervise when necessary  ¯ Involve family in performance of ADLs  ¯ Assess for home care needs following discharge   ¯ Request OT consult to assist with ADL evaluation and planning for discharge  ¯ Provide patient education as appropriate  Outcome: Progressing  Goal: Maintain or return mobility status to optimal level  Description  INTERVENTIONS:  - Assess patient's baseline mobility status (ambulation, transfers, stairs, etc )    - Identify cognitive and physical deficits and behaviors that affect mobility  - Identify mobility aids required to assist with transfers and/or ambulation (gait belt, sit-to-stand, lift, walker, cane, etc )  - [place] fall precautions as indicated by assessment  - Record patient progress and toleration of activity level on Mobility SBAR; progress patient to next Phase/Stage  - Instruct patient to call for assistance with activity based on assessment  - Request Rehabilitation consult to assist with strengthening/weightbearing, etc   Outcome: Progressing     Problem: Knowledge Deficit  Goal: Patient/family/caregiver demonstrates understanding of disease process, treatment plan, medications, and discharge instructions  Description  Complete learning assessment and assess knowledge base    Interventions:  - Provide teaching at level of understanding  - Provide teaching via preferred learning methods  Outcome: Progressing     Problem: DISCHARGE PLANNING  Goal: Discharge to home or other facility with appropriate resources  Description  INTERVENTIONS:  - Identify barriers to discharge w/patient and caregiver  - Arrange for needed discharge resources and transportation as appropriate  - Identify discharge learning needs (meds, wound care, etc )  - Arrange for interpretive services to assist at discharge as needed  - Refer to Case Management Department for coordinating discharge planning if the patient needs post-hospital services based on physician/advanced practitioner order or complex needs related to functional status, cognitive ability, or social support system  Outcome: Progressing     Problem: POSTPARTUM  Goal: Experiences normal postpartum course  Description  INTERVENTIONS:  - Monitor maternal vital signs  - Assess uterine involution and lochia  Outcome: Progressing  Goal: Appropriate maternal -  bonding  Description  INTERVENTIONS:  - Identify family support  - Assess for appropriate maternal/infant bonding   -Encourage maternal/infant bonding opportunities  - Referral to  or  as needed  Outcome: Progressing  Goal: Establishment of infant feeding pattern  Description  INTERVENTIONS:  - Assess breast/bottle feeding  - Refer to lactation as needed  Outcome: Progressing  Goal: Incision(s), wounds(s) or drain site(s) healing without S/S of infection  Description  INTERVENTIONS  - Assess and document risk factors for skin impairment   - Assess and document dressing, incision, wound bed, drain sites and surrounding tissue  - Initiate Nutrition services consult and/or wound management as needed  Outcome: Progressing

## 2019-07-16 NOTE — PROGRESS NOTES
Dr Rob Kong was notified regarding her hemoglobin value of 6 7  Dr Beto Stiles informed me to continue to monitor since patient asymptomatic

## 2019-07-16 NOTE — PLAN OF CARE
Problem: ALTERATION IN THE BREAST  Goal: Optimize infant feeding at the breast  Description  INTERVENTIONS:  - Latch, breast and nipple assessment  - Assess prior breast feeding history  - Hand expression of breast milk  - For cracked, bleeding and or sore nipples reassess latch, treat damaged nipple  -Educate mother on feeding cues  -Positioning/latch techniques  Outcome: Progressing     Problem: INADEQUATE LATCH, SUCK OR SWALLOW  Goal: Demonstrate ability to latch and sustain latch, audible swallowing and satiety  Description  INTERVENTIONS:  - Assess oral anatomy, notify Physician/AP for abnormal findings  - Establish milk expression  - Maximize feeding opportunity (skin to skin, behavioral state)  - Position/latch techniques  - Discourage use of pacifier-artificial nipple  - Mechanical pumping  - Nipple Shield  - Supplemental formula feeding (Physician/AP order)  - Alternative feeding method  Outcome: Progressing     Problem: PAIN - ADULT  Goal: Verbalizes/displays adequate comfort level or baseline comfort level  Description  Interventions:  - Encourage patient to monitor pain and request assistance  - Assess pain using appropriate pain scale  - Administer analgesics based on type and severity of pain and evaluate response  - Implement non-pharmacological measures as appropriate and evaluate response  - Consider cultural and social influences on pain and pain management  - Notify physician/advanced practitioner if interventions unsuccessful or patient reports new pain  Outcome: Progressing     Problem: INFECTION - ADULT  Goal: Absence or prevention of progression during hospitalization  Description  INTERVENTIONS:  - Assess and monitor for signs and symptoms of infection  - Monitor lab/diagnostic results  - Monitor all insertion sites, i e  indwelling lines, tubes, and drains  - Monitor endotracheal (as able) and nasal secretions for changes in amount and color  - [place] appropriate cooling/warming therapies per order  - Administer medications as ordered  - Instruct and encourage patient and family to use good hand hygiene technique  - Identify and instruct in appropriate isolation precautions for identified infection/condition  Outcome: Progressing  Goal: Absence of fever/infection during neutropenic period  Description  INTERVENTIONS:  - Monitor WBC  - Implement neutropenic guidelines  Outcome: Progressing     Problem: SAFETY ADULT  Goal: Patient will remain free of falls  Description  INTERVENTIONS:  - Assess patient frequently for physical needs  -  Identify cognitive and physical deficits and behaviors that affect risk of falls    -  [place] fall precautions as indicated by assessment   - Educate patient/family on patient safety including physical limitations  - Instruct patient to call for assistance with activity based on assessment  - Modify environment to reduce risk of injury  - Consider OT/PT consult to assist with strengthening/mobility  Outcome: Progressing  Goal: Maintain or return to baseline ADL function  Description  INTERVENTIONS:  -  Assess patient's ability to carry out ADLs; assess patient's baseline for ADL function and identify physical deficits which impact ability to perform ADLs (bathing, care of mouth/teeth, toileting, grooming, dressing, etc )  - Assess/evaluate cause of self-care deficits   - Assess range of motion  - Assess patient's mobility; develop plan if impaired  - Assess patient's need for assistive devices and provide as appropriate  - Encourage maximum independence but intervene and supervise when necessary  ¯ Involve family in performance of ADLs  ¯ Assess for home care needs following discharge   ¯ Request OT consult to assist with ADL evaluation and planning for discharge  ¯ Provide patient education as appropriate  Outcome: Progressing  Goal: Maintain or return mobility status to optimal level  Description  INTERVENTIONS:  - Assess patient's baseline mobility status (ambulation, transfers, stairs, etc )    - Identify cognitive and physical deficits and behaviors that affect mobility  - Identify mobility aids required to assist with transfers and/or ambulation (gait belt, sit-to-stand, lift, walker, cane, etc )  - [place] fall precautions as indicated by assessment  - Record patient progress and toleration of activity level on Mobility SBAR; progress patient to next Phase/Stage  - Instruct patient to call for assistance with activity based on assessment  - Request Rehabilitation consult to assist with strengthening/weightbearing, etc   Outcome: Progressing     Problem: Knowledge Deficit  Goal: Patient/family/caregiver demonstrates understanding of disease process, treatment plan, medications, and discharge instructions  Description  Complete learning assessment and assess knowledge base    Interventions:  - Provide teaching at level of understanding  - Provide teaching via preferred learning methods  Outcome: Progressing     Problem: DISCHARGE PLANNING  Goal: Discharge to home or other facility with appropriate resources  Description  INTERVENTIONS:  - Identify barriers to discharge w/patient and caregiver  - Arrange for needed discharge resources and transportation as appropriate  - Identify discharge learning needs (meds, wound care, etc )  - Arrange for interpretive services to assist at discharge as needed  - Refer to Case Management Department for coordinating discharge planning if the patient needs post-hospital services based on physician/advanced practitioner order or complex needs related to functional status, cognitive ability, or social support system  Outcome: Progressing     Problem: POSTPARTUM  Goal: Experiences normal postpartum course  Description  INTERVENTIONS:  - Monitor maternal vital signs  - Assess uterine involution and lochia  Outcome: Progressing  Goal: Appropriate maternal -  bonding  Description  INTERVENTIONS:  - Identify family support  - Assess for appropriate maternal/infant bonding   -Encourage maternal/infant bonding opportunities  - Referral to  or  as needed  Outcome: Progressing  Goal: Establishment of infant feeding pattern  Description  INTERVENTIONS:  - Assess breast/bottle feeding  - Refer to lactation as needed  Outcome: Progressing  Goal: Incision(s), wounds(s) or drain site(s) healing without S/S of infection  Description  INTERVENTIONS  - Assess and document risk factors for skin impairment   - Assess and document dressing, incision, wound bed, drain sites and surrounding tissue  - Initiate Nutrition services consult and/or wound management as needed  Outcome: Progressing

## 2019-07-16 NOTE — PROGRESS NOTES
Progress Note - OB/GYN   Geetha Cordova 30 y o  female MRN: [id number]  Unit/Bed#: -01 Encounter: [id number]    Assessment:  PP/POD#1 s/p Spontaneous Vaginal Delivery, improving    Plan:  1  Post partum   - Continue routine post partum care  - Encourage ambulation  - Encourage breastfeeding    2  Discharge planning  - Contraception: undecided, considering pill  - Anticipate discharge tomorrow    3  PPH  - Hgb 6 7 postpartum from 8 8  - Pt discussed with attending, unsure about receiving transfusion today, paperwork on board if she desires  - Will monitor symptoms of anemia today       Subjective/Objective   Chief Complaint:   PP/POD#1 s/p Spontaneous Vaginal Delivery  Patient doing well  Lochia WNL  Pain well controlled  Subjective: This morning, she has no major complaints  Pt feels some tightness in her chest but thinks it is from pushing so much yesterday  Denies pain, SOB, lightheadedness  Pain: cramping, responding to PO meds  Tolerating PO: yes  Voiding: yes  Flatus: yes  BM: no  Ambulating: yes  Breastfeeding: Breastfeeding  Chest pain: no  Shortness of breath: no  Leg pain: no  Lochia: improving, WNL    Objective:     Vitals: /68   Pulse 85   Temp 98 4 °F (36 9 °C) (Oral)   Resp 18   LMP 09/18/2018   SpO2 99%   Breastfeeding? Yes       Intake/Output Summary (Last 24 hours) at 7/16/2019 0642  Last data filed at 7/16/2019 0101  Gross per 24 hour   Intake 995 ml   Output 2171 ml   Net -1176 ml       Lab Results   Component Value Date    WBC 11 74 (H) 07/16/2019    HGB 6 7 (LL) 07/16/2019    HCT 22 8 (L) 07/16/2019    MCV 80 (L) 07/16/2019     07/16/2019       Physical Exam:   Physical Exam   Constitutional: She is oriented to person, place, and time  She appears well-developed and well-nourished  No distress  HENT:   Head: Normocephalic and atraumatic  Eyes: Conjunctivae are normal    Cardiovascular: Normal rate, regular rhythm and normal heart sounds     Pulmonary/Chest: Effort normal and breath sounds normal    Abdominal: Soft  She exhibits no distension  There is no tenderness  Musculoskeletal: Normal range of motion  Neurological: She is alert and oriented to person, place, and time  Skin: Skin is warm and dry         Brody Trujillo MD  PGY-1, OB/GYN  7/16/2019 6:42 AM

## 2019-07-16 NOTE — ANESTHESIA POSTPROCEDURE EVALUATION
Post-Op Assessment Note          Comments: pt resting in bed, epidural catheter removed, tip intact    Post-op block assessment: catheter intact        BP      Temp      Pulse     Resp      SpO2

## 2019-07-17 VITALS
HEART RATE: 71 BPM | OXYGEN SATURATION: 98 % | DIASTOLIC BLOOD PRESSURE: 60 MMHG | RESPIRATION RATE: 20 BRPM | TEMPERATURE: 98.3 F | SYSTOLIC BLOOD PRESSURE: 115 MMHG

## 2019-07-17 PROBLEM — Z3A.39 39 WEEKS GESTATION OF PREGNANCY: Status: RESOLVED | Noted: 2019-06-12 | Resolved: 2019-07-17

## 2019-07-17 PROCEDURE — 99024 POSTOP FOLLOW-UP VISIT: CPT | Performed by: OBSTETRICS & GYNECOLOGY

## 2019-07-17 RX ORDER — IBUPROFEN 600 MG/1
600 TABLET ORAL EVERY 6 HOURS PRN
Qty: 30 TABLET | Refills: 0 | Status: SHIPPED | OUTPATIENT
Start: 2019-07-17 | End: 2019-09-17

## 2019-07-17 RX ORDER — DOCUSATE SODIUM 100 MG/1
100 CAPSULE, LIQUID FILLED ORAL 2 TIMES DAILY PRN
Qty: 30 CAPSULE | Refills: 0 | Status: SHIPPED | OUTPATIENT
Start: 2019-07-17 | End: 2019-09-10 | Stop reason: ALTCHOICE

## 2019-07-17 RX ORDER — ACETAMINOPHEN 325 MG/1
650 TABLET ORAL EVERY 4 HOURS PRN
Qty: 30 TABLET | Refills: 0 | Status: CANCELLED
Start: 2019-07-17

## 2019-07-17 RX ORDER — FERROUS SULFATE 325(65) MG
325 TABLET ORAL 2 TIMES DAILY WITH MEALS
Refills: 0 | Status: SHIPPED | OUTPATIENT
Start: 2019-07-17 | End: 2019-09-17

## 2019-07-17 RX ADMIN — ACETAMINOPHEN 650 MG: 325 TABLET ORAL at 10:03

## 2019-07-17 RX ADMIN — DOCUSATE SODIUM 100 MG: 100 CAPSULE, LIQUID FILLED ORAL at 12:52

## 2019-07-17 RX ADMIN — IBUPROFEN 600 MG: 600 TABLET ORAL at 16:07

## 2019-07-17 RX ADMIN — ACETAMINOPHEN 650 MG: 325 TABLET ORAL at 02:01

## 2019-07-17 RX ADMIN — Medication 325 MG: at 09:55

## 2019-07-17 RX ADMIN — IBUPROFEN 600 MG: 600 TABLET ORAL at 04:58

## 2019-07-17 NOTE — PLAN OF CARE
Problem: ALTERATION IN THE BREAST  Goal: Optimize infant feeding at the breast  Description  INTERVENTIONS:  - Latch, breast and nipple assessment  - Assess prior breast feeding history  - Hand expression of breast milk  - For cracked, bleeding and or sore nipples reassess latch, treat damaged nipple  -Educate mother on feeding cues  -Positioning/latch techniques  Outcome: Progressing     Problem: INADEQUATE LATCH, SUCK OR SWALLOW  Goal: Demonstrate ability to latch and sustain latch, audible swallowing and satiety  Description  INTERVENTIONS:  - Assess oral anatomy, notify Physician/AP for abnormal findings  - Establish milk expression  - Maximize feeding opportunity (skin to skin, behavioral state)  - Position/latch techniques  - Discourage use of pacifier-artificial nipple  - Mechanical pumping  - Nipple Shield  - Supplemental formula feeding (Physician/AP order)  - Alternative feeding method  Outcome: Progressing     Problem: PAIN - ADULT  Goal: Verbalizes/displays adequate comfort level or baseline comfort level  Description  Interventions:  - Encourage patient to monitor pain and request assistance  - Assess pain using appropriate pain scale  - Administer analgesics based on type and severity of pain and evaluate response  - Implement non-pharmacological measures as appropriate and evaluate response  - Consider cultural and social influences on pain and pain management  - Notify physician/advanced practitioner if interventions unsuccessful or patient reports new pain  Outcome: Progressing     Problem: INFECTION - ADULT  Goal: Absence or prevention of progression during hospitalization  Description  INTERVENTIONS:  - Assess and monitor for signs and symptoms of infection  - Monitor lab/diagnostic results  - Monitor all insertion sites, i e  indwelling lines, tubes, and drains  - Monitor endotracheal (as able) and nasal secretions for changes in amount and color  - [place] appropriate cooling/warming therapies per order  - Administer medications as ordered  - Instruct and encourage patient and family to use good hand hygiene technique  - Identify and instruct in appropriate isolation precautions for identified infection/condition  Outcome: Progressing  Goal: Absence of fever/infection during neutropenic period  Description  INTERVENTIONS:  - Monitor WBC  - Implement neutropenic guidelines  Outcome: Progressing     Problem: SAFETY ADULT  Goal: Patient will remain free of falls  Description  INTERVENTIONS:  - Assess patient frequently for physical needs  -  Identify cognitive and physical deficits and behaviors that affect risk of falls    -  [place] fall precautions as indicated by assessment   - Educate patient/family on patient safety including physical limitations  - Instruct patient to call for assistance with activity based on assessment  - Modify environment to reduce risk of injury  - Consider OT/PT consult to assist with strengthening/mobility  Outcome: Progressing  Goal: Maintain or return to baseline ADL function  Description  INTERVENTIONS:  -  Assess patient's ability to carry out ADLs; assess patient's baseline for ADL function and identify physical deficits which impact ability to perform ADLs (bathing, care of mouth/teeth, toileting, grooming, dressing, etc )  - Assess/evaluate cause of self-care deficits   - Assess range of motion  - Assess patient's mobility; develop plan if impaired  - Assess patient's need for assistive devices and provide as appropriate  - Encourage maximum independence but intervene and supervise when necessary  ¯ Involve family in performance of ADLs  ¯ Assess for home care needs following discharge   ¯ Request OT consult to assist with ADL evaluation and planning for discharge  ¯ Provide patient education as appropriate  Outcome: Progressing  Goal: Maintain or return mobility status to optimal level  Description  INTERVENTIONS:  - Assess patient's baseline mobility status (ambulation, transfers, stairs, etc )    - Identify cognitive and physical deficits and behaviors that affect mobility  - Identify mobility aids required to assist with transfers and/or ambulation (gait belt, sit-to-stand, lift, walker, cane, etc )  - [place] fall precautions as indicated by assessment  - Record patient progress and toleration of activity level on Mobility SBAR; progress patient to next Phase/Stage  - Instruct patient to call for assistance with activity based on assessment  - Request Rehabilitation consult to assist with strengthening/weightbearing, etc   Outcome: Progressing     Problem: Knowledge Deficit  Goal: Patient/family/caregiver demonstrates understanding of disease process, treatment plan, medications, and discharge instructions  Description  Complete learning assessment and assess knowledge base    Interventions:  - Provide teaching at level of understanding  - Provide teaching via preferred learning methods  Outcome: Progressing     Problem: DISCHARGE PLANNING  Goal: Discharge to home or other facility with appropriate resources  Description  INTERVENTIONS:  - Identify barriers to discharge w/patient and caregiver  - Arrange for needed discharge resources and transportation as appropriate  - Identify discharge learning needs (meds, wound care, etc )  - Arrange for interpretive services to assist at discharge as needed  - Refer to Case Management Department for coordinating discharge planning if the patient needs post-hospital services based on physician/advanced practitioner order or complex needs related to functional status, cognitive ability, or social support system  Outcome: Progressing     Problem: POSTPARTUM  Goal: Experiences normal postpartum course  Description  INTERVENTIONS:  - Monitor maternal vital signs  - Assess uterine involution and lochia  Outcome: Progressing  Goal: Appropriate maternal -  bonding  Description  INTERVENTIONS:  - Identify family support  - Assess for appropriate maternal/infant bonding   -Encourage maternal/infant bonding opportunities  - Referral to  or  as needed  Outcome: Progressing  Goal: Establishment of infant feeding pattern  Description  INTERVENTIONS:  - Assess breast/bottle feeding  - Refer to lactation as needed  Outcome: Progressing  Goal: Incision(s), wounds(s) or drain site(s) healing without S/S of infection  Description  INTERVENTIONS  - Assess and document risk factors for skin impairment   - Assess and document dressing, incision, wound bed, drain sites and surrounding tissue  - Initiate Nutrition services consult and/or wound management as needed  Outcome: Progressing

## 2019-07-17 NOTE — PLAN OF CARE
Problem: ALTERATION IN THE BREAST  Goal: Optimize infant feeding at the breast  Description  INTERVENTIONS:  - Latch, breast and nipple assessment  - Assess prior breast feeding history  - Hand expression of breast milk  - For cracked, bleeding and or sore nipples reassess latch, treat damaged nipple  -Educate mother on feeding cues  -Positioning/latch techniques  Outcome: Adequate for Discharge     Problem: INADEQUATE LATCH, SUCK OR SWALLOW  Goal: Demonstrate ability to latch and sustain latch, audible swallowing and satiety  Description  INTERVENTIONS:  - Assess oral anatomy, notify Physician/AP for abnormal findings  - Establish milk expression  - Maximize feeding opportunity (skin to skin, behavioral state)  - Position/latch techniques  - Discourage use of pacifier-artificial nipple  - Mechanical pumping  - Nipple Shield  - Supplemental formula feeding (Physician/AP order)  - Alternative feeding method  Outcome: Adequate for Discharge     Problem: PAIN - ADULT  Goal: Verbalizes/displays adequate comfort level or baseline comfort level  Description  Interventions:  - Encourage patient to monitor pain and request assistance  - Assess pain using appropriate pain scale  - Administer analgesics based on type and severity of pain and evaluate response  - Implement non-pharmacological measures as appropriate and evaluate response  - Consider cultural and social influences on pain and pain management  - Notify physician/advanced practitioner if interventions unsuccessful or patient reports new pain  Outcome: Adequate for Discharge     Problem: INFECTION - ADULT  Goal: Absence or prevention of progression during hospitalization  Description  INTERVENTIONS:  - Assess and monitor for signs and symptoms of infection  - Monitor lab/diagnostic results  - Monitor all insertion sites, i e  indwelling lines, tubes, and drains  - Monitor endotracheal (as able) and nasal secretions for changes in amount and color  - [place] appropriate cooling/warming therapies per order  - Administer medications as ordered  - Instruct and encourage patient and family to use good hand hygiene technique  - Identify and instruct in appropriate isolation precautions for identified infection/condition  Outcome: Adequate for Discharge  Goal: Absence of fever/infection during neutropenic period  Description  INTERVENTIONS:  - Monitor WBC  - Implement neutropenic guidelines  Outcome: Adequate for Discharge     Problem: SAFETY ADULT  Goal: Patient will remain free of falls  Description  INTERVENTIONS:  - Assess patient frequently for physical needs  -  Identify cognitive and physical deficits and behaviors that affect risk of falls    -  [place] fall precautions as indicated by assessment   - Educate patient/family on patient safety including physical limitations  - Instruct patient to call for assistance with activity based on assessment  - Modify environment to reduce risk of injury  - Consider OT/PT consult to assist with strengthening/mobility  Outcome: Adequate for Discharge  Goal: Maintain or return to baseline ADL function  Description  INTERVENTIONS:  -  Assess patient's ability to carry out ADLs; assess patient's baseline for ADL function and identify physical deficits which impact ability to perform ADLs (bathing, care of mouth/teeth, toileting, grooming, dressing, etc )  - Assess/evaluate cause of self-care deficits   - Assess range of motion  - Assess patient's mobility; develop plan if impaired  - Assess patient's need for assistive devices and provide as appropriate  - Encourage maximum independence but intervene and supervise when necessary  ¯ Involve family in performance of ADLs  ¯ Assess for home care needs following discharge   ¯ Request OT consult to assist with ADL evaluation and planning for discharge  ¯ Provide patient education as appropriate  Outcome: Adequate for Discharge  Goal: Maintain or return mobility status to optimal level  Description  INTERVENTIONS:  - Assess patient's baseline mobility status (ambulation, transfers, stairs, etc )    - Identify cognitive and physical deficits and behaviors that affect mobility  - Identify mobility aids required to assist with transfers and/or ambulation (gait belt, sit-to-stand, lift, walker, cane, etc )  - [place] fall precautions as indicated by assessment  - Record patient progress and toleration of activity level on Mobility SBAR; progress patient to next Phase/Stage  - Instruct patient to call for assistance with activity based on assessment  - Request Rehabilitation consult to assist with strengthening/weightbearing, etc   Outcome: Adequate for Discharge     Problem: Knowledge Deficit  Goal: Patient/family/caregiver demonstrates understanding of disease process, treatment plan, medications, and discharge instructions  Description  Complete learning assessment and assess knowledge base    Interventions:  - Provide teaching at level of understanding  - Provide teaching via preferred learning methods  Outcome: Adequate for Discharge     Problem: DISCHARGE PLANNING  Goal: Discharge to home or other facility with appropriate resources  Description  INTERVENTIONS:  - Identify barriers to discharge w/patient and caregiver  - Arrange for needed discharge resources and transportation as appropriate  - Identify discharge learning needs (meds, wound care, etc )  - Arrange for interpretive services to assist at discharge as needed  - Refer to Case Management Department for coordinating discharge planning if the patient needs post-hospital services based on physician/advanced practitioner order or complex needs related to functional status, cognitive ability, or social support system  Outcome: Adequate for Discharge     Problem: POSTPARTUM  Goal: Experiences normal postpartum course  Description  INTERVENTIONS:  - Monitor maternal vital signs  - Assess uterine involution and lochia  Outcome: Adequate for Discharge  Goal: Appropriate maternal -  bonding  Description  INTERVENTIONS:  - Identify family support  - Assess for appropriate maternal/infant bonding   -Encourage maternal/infant bonding opportunities  - Referral to  or  as needed  Outcome: Adequate for Discharge  Goal: Establishment of infant feeding pattern  Description  INTERVENTIONS:  - Assess breast/bottle feeding  - Refer to lactation as needed  Outcome: Adequate for Discharge  Goal: Incision(s), wounds(s) or drain site(s) healing without S/S of infection  Description  INTERVENTIONS  - Assess and document risk factors for skin impairment   - Assess and document dressing, incision, wound bed, drain sites and surrounding tissue  - Initiate Nutrition services consult and/or wound management as needed  Outcome: Adequate for Discharge

## 2019-07-17 NOTE — LACTATION NOTE
This note was copied from a baby's chart  Met with mother to go over feeding log since birth for the first week  Emphasized 8 or more (12) feedings in a 24 hour period, what to expect for the number of diapers per day of life and the progression of properties of the  stooling pattern  Discussed s/s that breastfeeding is going well after day 4 and when to get help from a pediatrician or lactation support person after day 4  Booklet included Breast Pumping Instructions, When You Go Back to Work or School, and Breastfeeding Resources for after discharge including access to the number for the [employer]  Discussed s/s engorgement and how to manage with medications and cool compresses as well as s/s mastitis and when to contact physician  Worked on positioning infant up at chest level and starting to feed infant with nose arriving at the nipple  Then, using areolar compression to achieve a deep latch that is comfortable and exchanges optimum amounts of milk  Infant has been supplementing with Sim until she was voiding  Enc skin to skin and to wait until infant shows cues to attempt to breastfeed again before offering the bottle again  Ext reviewed

## 2019-07-17 NOTE — PROGRESS NOTES
Progress Note - OB/GYN   Geetha Cordova 30 y o  female MRN: [id number]  Unit/Bed#: -01 Encounter: [id number]    Assessment:  PP/POD#2 s/p Spontaneous Vaginal Delivery, improving    Plan:  1  Post partum   - Continue routine post partum care  - Encourage ambulation  - Encourage breastfeeding    2  Discharge planning  - Contraception: undecided, considering pill  - Anticipate discharge today    3  PPH  - Hgb 8 8 --> 6 7 postpartum    - Pt not symptomatic, feeling well  Subjective/Objective   Chief Complaint:   PP/POD#2 s/p Spontaneous Vaginal Delivery  Patient doing well  Lochia WNL  Pain well controlled  Subjective: This morning, she has no major complaints  Pt has some lower back soreness, believes it is from laying in bed  Otherwise denies CP, SOB, weakness, fatigue, lightheadedness  Pain: lower back , responding to PO meds  Tolerating PO: yes  Voiding: yes  Flatus: yes  BM: no  Ambulating: yes  Breastfeeding: Breastfeeding  Chest pain: no  Shortness of breath: no  Leg pain: no  Lochia: minimal    Objective:     Vitals: /68   Pulse 82   Temp 98 5 °F (36 9 °C) (Oral)   Resp 18   LMP 09/18/2018   SpO2 98%   Breastfeeding? Yes     No intake or output data in the 24 hours ending 07/17/19 0535    Lab Results   Component Value Date    WBC 11 74 (H) 07/16/2019    HGB 6 7 (LL) 07/16/2019    HCT 22 8 (L) 07/16/2019    MCV 80 (L) 07/16/2019     07/16/2019       Physical Exam:   Physical Exam   Constitutional: She is oriented to person, place, and time  She appears well-developed and well-nourished  No distress  HENT:   Head: Normocephalic and atraumatic  Eyes: Conjunctivae are normal    Cardiovascular: Normal rate, regular rhythm and normal heart sounds  Pulmonary/Chest: Effort normal and breath sounds normal    Abdominal: Soft  She exhibits no distension  There is no tenderness (uterus firm below the level of the umbilicus )  Musculoskeletal: Normal range of motion  Neurological: She is alert and oriented to person, place, and time  Skin: Skin is warm and dry          America Ivey MD  PGY-1, OB/GYN  7/17/2019 5:35 AM

## 2019-07-18 ENCOUNTER — TELEPHONE (OUTPATIENT)
Dept: OBGYN CLINIC | Facility: CLINIC | Age: 24
End: 2019-07-18

## 2019-07-18 LAB
ABO GROUP BLD BPU: NORMAL
ABO GROUP BLD BPU: NORMAL
BPU ID: NORMAL
BPU ID: NORMAL
CROSSMATCH: NORMAL
CROSSMATCH: NORMAL
UNIT DISPENSE STATUS: NORMAL
UNIT DISPENSE STATUS: NORMAL
UNIT PRODUCT CODE: NORMAL
UNIT PRODUCT CODE: NORMAL
UNIT RH: NORMAL
UNIT RH: NORMAL

## 2019-07-18 NOTE — TELEPHONE ENCOUNTER
PP3 c/o few small clots here and there but just passed a clot the slightly larger than golf ball (but flat)  Overall bleeding is not heavy, saturating a pad every 3-4 times a day (not completely saturated)  Asymptomatic  She is taking iron supplement  Advised to monitor call the office if she is saturating a pad an hour, passing any large clot or becomes symptomatic, continue iron supplement along with increase in calcium (either in diet or as supplement) and colace  Verbalized understanding  Routing to provider to update

## 2019-07-24 ENCOUNTER — PROCEDURE VISIT (OUTPATIENT)
Dept: FAMILY MEDICINE CLINIC | Facility: CLINIC | Age: 24
End: 2019-07-24
Payer: COMMERCIAL

## 2019-07-24 ENCOUNTER — CLINICAL SUPPORT (OUTPATIENT)
Dept: OBGYN CLINIC | Facility: CLINIC | Age: 24
End: 2019-07-24
Payer: COMMERCIAL

## 2019-07-24 VITALS
RESPIRATION RATE: 16 BRPM | WEIGHT: 172 LBS | DIASTOLIC BLOOD PRESSURE: 98 MMHG | HEART RATE: 92 BPM | SYSTOLIC BLOOD PRESSURE: 138 MMHG | BODY MASS INDEX: 31.46 KG/M2

## 2019-07-24 VITALS — TEMPERATURE: 98.6 F | SYSTOLIC BLOOD PRESSURE: 142 MMHG | DIASTOLIC BLOOD PRESSURE: 92 MMHG

## 2019-07-24 DIAGNOSIS — B07.8 OTHER VIRAL WARTS: Primary | ICD-10-CM

## 2019-07-24 DIAGNOSIS — R03.0 ELEVATED BLOOD PRESSURE READING: Primary | ICD-10-CM

## 2019-07-24 PROCEDURE — 88305 TISSUE EXAM BY PATHOLOGIST: CPT | Performed by: PATHOLOGY

## 2019-07-24 PROCEDURE — 11306 SHAVE SKIN LESION 0.6-1.0 CM: CPT | Performed by: FAMILY MEDICINE

## 2019-07-24 PROCEDURE — RECHECK

## 2019-07-24 RX ORDER — ASCORBIC ACID 500 MG
500 TABLET ORAL DAILY
COMMUNITY
End: 2019-11-27 | Stop reason: ALTCHOICE

## 2019-07-24 NOTE — PROGRESS NOTES
Patient here for blood pressure check  Denies any headaches, blurred vision, epigastric pain,  chest pain, SOB or palpations  +2 pitting edema in feet, legs and hands slight edema  /98      Reviewed with Dr Tiney Cooks  Please call the office for headaches,edema,  visual changes, SOB, chest pain, epigastric pain or palpations  Verbalized understanding

## 2019-07-24 NOTE — PROGRESS NOTES
Biopsy  Date/Time: 7/24/2019 8:00 AM  Performed by: Soheila Kingston DO  Authorized by: Soheila Kingston DO     Procedure Details - Skin Biopsy:     Biopsy tissue type: skin    Biopsy method: shave biopsy      Body area:  Upper extremity    Malignancy: benign lesion

## 2019-07-24 NOTE — PROGRESS NOTES
Shave lesion  Date/Time: 7/24/2019 2:11 PM  Performed by: Nicol Fonseca DO  Authorized by: Nicol Fosneca DO     Number of Lesions: 1  Lesion 1:     Body area: upper extremity    Upper extremity location: L index finger    Initial size (mm): 6    Final defect size (mm): 6    Malignancy: benign lesion      Destruction method: shave removal          biopsy today      procedure consent obtained and scanned into chart      area cleansed with alcohol and numbed with ,1 cc Lidocaine (1% with Epi)     lesion removed with shave method      bleeding stopped and lesion further destoyed with  and electrodesication x2     tolerated well      path sent - call with results     wound care & s/s of infection  reviewed - handout given           HPI  José Miguel Collazo is a 30 y o  female who presents with:    Chief Complaint     Procedure            The following portions of the patient's history were reviewed and updated as appropriate: allergies, current medications, past family history, past medical history, past social history, past surgical history and problem list   Review of Systems   see HPI  Objective:    /92 (BP Location: Right arm)   Temp 98 6 °F (37 °C)   LMP 09/18/2018           Physical Exam        Shave biopsy today   Lesion becoming larger, more painful      procedure consent obtained and scanned into chart      Location: left first finger  area cleansed with alcohol and numbed with 1 cc Lidocaine (1% with Epi)     lesion removed with shave method      bleeding stopped with aluminum choride and electrodesication              Assessment/Plan:      Geetha was seen today for procedure      Diagnoses and all orders for this visit:    Other viral warts  -     Cancel: Biopsy  -     Tissue Exam; Future  -     Tissue Exam          tolerated well      path sent - call with results     wound care & s/s of infection  reviewed - handout given   Patient Instructions     Acute Wound Care   WHAT YOU NEED TO KNOW:   An acute wound is an injury that causes a break in the skin  DISCHARGE INSTRUCTIONS:   Medicines:   · NSAIDs  help decrease swelling, pain, and fever  This medicine is available with or without a doctor's order  NSAIDs can cause stomach bleeding or kidney problems in certain people  If you take blood thinner medicine, always ask your healthcare provider if NSAIDs are safe for you  Always read the medicine label and follow directions  · Acetaminophen  decreases pain and fever  It is available without a doctor's order  Ask how much to take and how often to take it  Follow directions  Acetaminophen can cause liver damage if not taken correctly  · Antibiotics  may be given to prevent or treat an infection caused by bacteria  · Take your medicine as directed  Contact your healthcare provider if you think your medicine is not helping or if you have side effects  Tell him if you are allergic to any medicine  Keep a list of the medicines, vitamins, and herbs you take  Include the amounts, and when and why you take them  Bring the list or the pill bottles to follow-up visits  Carry your medicine list with you in case of an emergency  Follow up with your healthcare provider as directed: You may need to return to have your stitches or staples removed, wound checked, or bandage changed  Write down your questions so you remember to ask them during your visits  Wound care:   · If your wound was closed with thin strips of medical tape, keep them clean and dry  The strips of medical tape will fall off on their own  Do not pull them off  · Keep the bandage clean and dry  Do not remove the bandage over your wound unless your healthcare provider says it is okay  · Wash your hands before and after you take care of your wound to prevent infection  · Clean the wound as directed  If you cannot reach the wound, have someone help you      · If you have packing, make sure all the gauze used to pack the wound is taken out and replaced as directed  Keep track of how many gauze dressings are placed inside the wound  Contact your healthcare provider if:   · You have muscle, joint, or body aches, sweating, or a fever  · You have more swelling, redness, or bleeding in your wound  · Your skin is itchy, swollen, or you have a rash  · You have questions or concerns about your condition or care  Return to the emergency department if:   · You have pus or a foul odor coming from the wound  · You have sudden trouble breathing or chest pain  · Blood soaks through your bandage  © 2016 [employer] [employer] is for End User's use only and may not be sold, redistributed or otherwise used for commercial purposes  All illustrations and images included in CareNotes® are the copyrighted property of A D A M [employer] Inc  or [employer]  The above information is an  only  It is not intended as medical advice for individual conditions or treatments  Talk to your doctor, nurse or pharmacist before following any medical regimen to see if it is safe and effective for you  No follow-ups on file  Assessment/Plan:  Problem List Items Addressed This Visit     None      Visit Diagnoses     Other viral warts    -  Primary    Relevant Orders    Tissue Exam              No follow-ups on file  Patient Instructions     Acute Wound Care   WHAT YOU NEED TO KNOW:   An acute wound is an injury that causes a break in the skin  DISCHARGE INSTRUCTIONS:   Medicines:   · NSAIDs  help decrease swelling, pain, and fever  This medicine is available with or without a doctor's order  NSAIDs can cause stomach bleeding or kidney problems in certain people  If you take blood thinner medicine, always ask your healthcare provider if NSAIDs are safe for you  Always read the medicine label and follow directions  · Acetaminophen  decreases pain and fever  It is available without a doctor's order   Ask how much to take and how often to take it  Follow directions  Acetaminophen can cause liver damage if not taken correctly  · Antibiotics  may be given to prevent or treat an infection caused by bacteria  · Take your medicine as directed  Contact your healthcare provider if you think your medicine is not helping or if you have side effects  Tell him if you are allergic to any medicine  Keep a list of the medicines, vitamins, and herbs you take  Include the amounts, and when and why you take them  Bring the list or the pill bottles to follow-up visits  Carry your medicine list with you in case of an emergency  Follow up with your healthcare provider as directed: You may need to return to have your stitches or staples removed, wound checked, or bandage changed  Write down your questions so you remember to ask them during your visits  Wound care:   · If your wound was closed with thin strips of medical tape, keep them clean and dry  The strips of medical tape will fall off on their own  Do not pull them off  · Keep the bandage clean and dry  Do not remove the bandage over your wound unless your healthcare provider says it is okay  · Wash your hands before and after you take care of your wound to prevent infection  · Clean the wound as directed  If you cannot reach the wound, have someone help you  · If you have packing, make sure all the gauze used to pack the wound is taken out and replaced as directed  Keep track of how many gauze dressings are placed inside the wound  Contact your healthcare provider if:   · You have muscle, joint, or body aches, sweating, or a fever  · You have more swelling, redness, or bleeding in your wound  · Your skin is itchy, swollen, or you have a rash  · You have questions or concerns about your condition or care  Return to the emergency department if:   · You have pus or a foul odor coming from the wound  · You have sudden trouble breathing or chest pain      · Blood soaks through your bandage  2016 [address] [employer] [employer] is for End User's use only and may not be sold, redistributed or otherwise used for commercial purposes  All illustrations and images included in CareNotes® are the copyrighted property of [employer]  or [employer]  The above information is an  only  It is not intended as medical advice for individual conditions or treatments  Talk to your doctor, nurse or pharmacist before following any medical regimen to see if it is safe and effective for you  Subjective:   Geetha is a 30 y o  female here today for a procedure    Patient Active Problem List   Diagnosis    Vitamin D deficiency    Anxiety    Chronic gastritis without bleeding    Exercise-induced asthma    Chronic fatigue    Elevated LFTs    Rh negative state in antepartum period    Encounter for supervision of other normal pregnancy, third trimester    Back pain affecting pregnancy in second trimester    High risk pregnancy with high inhibin    Polyhydramnios in third trimester     (spontaneous vaginal delivery)    Other immediate postpartum hemorrhage          Current Outpatient Medications:     albuterol (PROVENTIL HFA,VENTOLIN HFA) 90 mcg/act inhaler, Inhale 2 puffs every 6 (six) hours as needed for wheezing, Disp: , Rfl:     ascorbic acid (VITAMIN C) 500 mg tablet, Take 500 mg by mouth daily, Disp: , Rfl:     docusate sodium (COLACE) 100 mg capsule, Take 1 capsule (100 mg total) by mouth 2 (two) times a day as needed (constipation per bowel protocol), Disp: 30 capsule, Rfl: 0    ferrous sulfate 325 (65 Fe) mg tablet, Take 1 tablet (325 mg total) by mouth 2 (two) times a day with meals, Disp: , Rfl: 0    ibuprofen (MOTRIN) 600 mg tablet, Take 1 tablet (600 mg total) by mouth every 6 (six) hours as needed for moderate pain, Disp: 30 tablet, Rfl: 0    Prenatal MV-Min-FA-Omega-3 (PRENATAL GUMMIES/DHA & FA) 0 4-32 5 MG CHEW, Chew 2 tablets daily, Disp: , Rfl:     HPI:  Chief Complaint   Patient presents with    Procedure     -- Above per clinical staff and reviewed  --    PHQ-9 Depression Screening    PHQ-9:    Frequency of the following problems over the past two weeks:                No My Sticky Note on file    Today:          Objective:  Vitals:  /92 (BP Location: Right arm)   Temp 98 6 °F (37 °C)   LMP 09/18/2018        Physical Exam   Musculoskeletal:        Hands:

## 2019-07-24 NOTE — PATIENT INSTRUCTIONS
Acute Wound Care   WHAT YOU NEED TO KNOW:   An acute wound is an injury that causes a break in the skin  DISCHARGE INSTRUCTIONS:   Medicines:   · NSAIDs  help decrease swelling, pain, and fever  This medicine is available with or without a doctor's order  NSAIDs can cause stomach bleeding or kidney problems in certain people  If you take blood thinner medicine, always ask your healthcare provider if NSAIDs are safe for you  Always read the medicine label and follow directions  · Acetaminophen  decreases pain and fever  It is available without a doctor's order  Ask how much to take and how often to take it  Follow directions  Acetaminophen can cause liver damage if not taken correctly  · Antibiotics  may be given to prevent or treat an infection caused by bacteria  · Take your medicine as directed  Contact your healthcare provider if you think your medicine is not helping or if you have side effects  Tell him if you are allergic to any medicine  Keep a list of the medicines, vitamins, and herbs you take  Include the amounts, and when and why you take them  Bring the list or the pill bottles to follow-up visits  Carry your medicine list with you in case of an emergency  Follow up with your healthcare provider as directed: You may need to return to have your stitches or staples removed, wound checked, or bandage changed  Write down your questions so you remember to ask them during your visits  Wound care:   · If your wound was closed with thin strips of medical tape, keep them clean and dry  The strips of medical tape will fall off on their own  Do not pull them off  · Keep the bandage clean and dry  Do not remove the bandage over your wound unless your healthcare provider says it is okay  · Wash your hands before and after you take care of your wound to prevent infection  · Clean the wound as directed  If you cannot reach the wound, have someone help you      · If you have packing, make sure all the gauze used to pack the wound is taken out and replaced as directed  Keep track of how many gauze dressings are placed inside the wound  Contact your healthcare provider if:   · You have muscle, joint, or body aches, sweating, or a fever  · You have more swelling, redness, or bleeding in your wound  · Your skin is itchy, swollen, or you have a rash  · You have questions or concerns about your condition or care  Return to the emergency department if:   · You have pus or a foul odor coming from the wound  · You have sudden trouble breathing or chest pain  · Blood soaks through your bandage  © 2016 [address] [employer] [employer] is for End User's use only and may not be sold, redistributed or otherwise used for commercial purposes  All illustrations and images included in CareNotes® are the copyrighted property of [employer]  or [employer]  The above information is an  only  It is not intended as medical advice for individual conditions or treatments  Talk to your doctor, nurse or pharmacist before following any medical regimen to see if it is safe and effective for you

## 2019-07-25 LAB — PLACENTA IN STORAGE: NORMAL

## 2019-07-26 ENCOUNTER — ROUTINE PRENATAL (OUTPATIENT)
Dept: OBGYN CLINIC | Facility: CLINIC | Age: 24
End: 2019-07-26
Payer: COMMERCIAL

## 2019-07-26 VITALS
WEIGHT: 169 LBS | DIASTOLIC BLOOD PRESSURE: 62 MMHG | HEIGHT: 62 IN | BODY MASS INDEX: 31.1 KG/M2 | SYSTOLIC BLOOD PRESSURE: 100 MMHG

## 2019-07-26 DIAGNOSIS — N89.8 VAGINAL ITCHING: Primary | ICD-10-CM

## 2019-07-26 PROBLEM — Z34.83 ENCOUNTER FOR SUPERVISION OF OTHER NORMAL PREGNANCY, THIRD TRIMESTER: Status: RESOLVED | Noted: 2019-01-08 | Resolved: 2019-07-26

## 2019-07-26 PROBLEM — O09.899 HIGH RISK PREGNANCY WITH HIGH INHIBIN: Status: RESOLVED | Noted: 2019-04-03 | Resolved: 2019-07-26

## 2019-07-26 PROBLEM — M54.9 BACK PAIN AFFECTING PREGNANCY IN SECOND TRIMESTER: Status: RESOLVED | Noted: 2019-02-08 | Resolved: 2019-07-26

## 2019-07-26 PROBLEM — O40.3XX0 POLYHYDRAMNIOS IN THIRD TRIMESTER: Status: RESOLVED | Noted: 2019-06-12 | Resolved: 2019-07-26

## 2019-07-26 PROBLEM — O99.891 BACK PAIN AFFECTING PREGNANCY IN SECOND TRIMESTER: Status: RESOLVED | Noted: 2019-02-08 | Resolved: 2019-07-26

## 2019-07-26 PROBLEM — Z67.91 RH NEGATIVE STATE IN ANTEPARTUM PERIOD: Status: RESOLVED | Noted: 2018-12-06 | Resolved: 2019-07-26

## 2019-07-26 PROBLEM — O28.8 HIGH RISK PREGNANCY WITH HIGH INHIBIN: Status: RESOLVED | Noted: 2019-04-03 | Resolved: 2019-07-26

## 2019-07-26 PROBLEM — O26.899 RH NEGATIVE STATE IN ANTEPARTUM PERIOD: Status: RESOLVED | Noted: 2018-12-06 | Resolved: 2019-07-26

## 2019-07-26 PROCEDURE — 99213 OFFICE O/P EST LOW 20 MIN: CPT | Performed by: OBSTETRICS & GYNECOLOGY

## 2019-07-26 NOTE — PROGRESS NOTES
Assessment Geetha was seen today for vaginitis  Diagnoses and all orders for this visit:    Vaginal itching         Plan   No evidence of infection or poor healing along suture line, no evidence of yeast or rashes  Discussed symptomatic local care - OK to use hydrocortisone, recommend barrier cream such as vaseline/aquaphor/A&D ointment while still needing pads, suspect they may be irritating her skin  F/up for postpartum visit or sooner as needed  Subjective     Sofia Narvaez is a 30 y o  female here for a problem visit  Patient is complaining of vulvar itching  Initially thought this was just along her suture line from recent vaginal delivery but now feels it is more on her bilateral labia majora  She is still having lochia and requiring pads daily  She denies any vaginal burning, foul smelling vaginal discharge  She has not had intercourse since delivery  No other complaints  Patient Active Problem List   Diagnosis    Vitamin D deficiency    Anxiety    Chronic gastritis without bleeding    Exercise-induced asthma    Chronic fatigue    Elevated LFTs     (spontaneous vaginal delivery)         Gynecologic History  Patient's last menstrual period was 2018  Obstetric History  OB History    Para Term  AB Living   1 1 1 0 0 1   SAB TAB Ectopic Multiple Live Births   0 0 0 0 1      # Outcome Date GA Lbr Nathan/2nd Weight Sex Delivery Anes PTL Lv   1 Term 07/15/19 39w5d / 01:48 3629 g (8 lb) F Vag-Spont EPI N RON      Obstetric Comments   Polyhydramnios, anemia, elevated inhibin levels       Past Medical/Surgical/Family/Social History  The following portions of the patient's history were reviewed and updated as appropriate: allergies, current medications, past family history, past medical history, past social history and past surgical history  Allergies  Patient has no known allergies      Medications    Current Outpatient Medications:     albuterol (PROVENTIL HFA,VENTOLIN HFA) 90 mcg/act inhaler, Inhale 2 puffs every 6 (six) hours as needed for wheezing, Disp: , Rfl:     ascorbic acid (VITAMIN C) 500 mg tablet, Take 500 mg by mouth daily, Disp: , Rfl:     docusate sodium (COLACE) 100 mg capsule, Take 1 capsule (100 mg total) by mouth 2 (two) times a day as needed (constipation per bowel protocol), Disp: 30 capsule, Rfl: 0    ferrous sulfate 325 (65 Fe) mg tablet, Take 1 tablet (325 mg total) by mouth 2 (two) times a day with meals, Disp: , Rfl: 0    ibuprofen (MOTRIN) 600 mg tablet, Take 1 tablet (600 mg total) by mouth every 6 (six) hours as needed for moderate pain, Disp: 30 tablet, Rfl: 0    Prenatal MV-Min-FA-Omega-3 (PRENATAL GUMMIES/DHA & FA) 0 4-32 5 MG CHEW, Chew 2 tablets daily, Disp: , Rfl:       Review of Systems  Constitutional: no fever, feels well, no tiredness, no recent weight gain or loss  Breasts:no complaints of breast pain, breast lump, or nipple discharge  Gastrointestinal: no complaints of abdominal pain, constipation, nausea, vomiting, or diarrhea or bloody stools  Genitourinary : see HPI       Objective     /62   Ht 5' 2" (1 575 m)   Wt 76 7 kg (169 lb)   LMP 09/18/2018   BMI 30 91 kg/m²     General: alert and oriented, in no acute distress  Vulva: normal, no evidence of rash or lesions   Perineum is healing well, no erythema or purulent discharge  Vagina: normal mucosa, scant blood

## 2019-07-31 ENCOUNTER — OFFICE VISIT (OUTPATIENT)
Dept: POSTPARTUM | Facility: CLINIC | Age: 24
End: 2019-07-31
Payer: COMMERCIAL

## 2019-07-31 ENCOUNTER — TELEPHONE (OUTPATIENT)
Dept: OBGYN CLINIC | Facility: CLINIC | Age: 24
End: 2019-07-31

## 2019-07-31 VITALS — DIASTOLIC BLOOD PRESSURE: 92 MMHG | SYSTOLIC BLOOD PRESSURE: 130 MMHG | TEMPERATURE: 100 F

## 2019-07-31 DIAGNOSIS — O92.70 LACTATION PROBLEM: ICD-10-CM

## 2019-07-31 DIAGNOSIS — N61.0 MASTITIS: Primary | ICD-10-CM

## 2019-07-31 DIAGNOSIS — Z71.89 ENCOUNTER FOR BREAST FEEDING COUNSELING: Primary | ICD-10-CM

## 2019-07-31 DIAGNOSIS — O92.79 PAIN AGGRAVATED BY BREAST FEEDING: ICD-10-CM

## 2019-07-31 DIAGNOSIS — R52 PAIN AGGRAVATED BY BREAST FEEDING: ICD-10-CM

## 2019-07-31 PROCEDURE — 99404 PREV MED CNSL INDIV APPRX 60: CPT | Performed by: PEDIATRICS

## 2019-07-31 RX ORDER — CEPHALEXIN 500 MG/1
500 CAPSULE ORAL EVERY 6 HOURS SCHEDULED
Qty: 28 CAPSULE | Refills: 0 | Status: SHIPPED | OUTPATIENT
Start: 2019-07-31 | End: 2019-08-07

## 2019-07-31 NOTE — TELEPHONE ENCOUNTER
Pt called this morning and thinks she has mastitis  Pt has aches, chills and a fever  Her breast is red and hurts to touch  Can you please send in a medication for her? She uses the [facility] pharmacy listed in her chart  Thank you!

## 2019-07-31 NOTE — PATIENT INSTRUCTIONS
Continue to offer the breast on demand paying close attention to positioning for a deeper latch  Refer to the instructional video "Attaching Your Baby at the Breast" on the [address] [place] [address] website for further review  Watch for sustained periods of active suckling and swallowing  When bottle feeding expressed milk, use paced bottle feeding  This method is less stressful for your baby, prevents overfeeding and protects the breastfeeding relationship  Pump whenever a feeding at the breast is missed, or at least every three hours  When pumping, Cycle your pump through stimulation and expression mode several times in a session to stimulate several let downs  Use hands on pumping and hand expression to increase your output  Maintain your pump as recommended  Warm moist compresses and gentle massage prior to pumping can help get milk flowing  Take antibiotics as prescribed  Call with any questions of concerns

## 2019-07-31 NOTE — TELEPHONE ENCOUNTER
Sent keflex 4times a day x 7 days  Continue to breast feed  Ibuprofen 600mg every 6-8 hours   Massage breast

## 2019-07-31 NOTE — PROGRESS NOTES
INITIAL BREAST FEEDING EVALUATION    Informant/Relationship: Geetha    Discussion of General Lactation Issues: Anya has struggled to latch since birth  Geetha felt things were getting better but she had a lot of pain with feedings  Last night Geetha began with chills and achiness  This morning she felt worse and had a temp of 101 3  She has erythema on the right breasts  She has started on antibiotics at the instruction of her OB  Infant is 3weeks old today   History:  Fertility Problem:no  Breast changes:yes - breasts got larger, swollen accessory breast tissue in axilla  : yes - induced at mother's request   Full term:yes - 39 5/7 weeks   labor:no  First nursing/attempt < 1 hour after birth:yes - baby was able to latch  Skin to skin following delivery:yes - until after the first feeding    Breast changes after delivery:yes - milk came in on day 4-5  Rooming in (infant in room with mother with exception of procedures, eg  Circumcision: no went to the nursery so mother could rest   Blood sugar issues:no  NICU stay:no  Jaundice:no  Phototherapy:no  Supplement given: (list supplement and method used as well as reason(s):yes - formula via bottle due to no urine output    Past Medical History:   Diagnosis Date    Anemia     Anxiety     stopped medication over 1 year ago    Asthma     prn inhaler, excercise induced    Carpal tunnel syndrome during pregnancy     right wrist , no brace    Depression     Esophagitis     Gastritis     IBS (irritable bowel syndrome)     Pelvic pain affecting pregnancy     seeing a Physical therapist    Pneumonia     Varicella     had chicken pox as child/immune -blood work levels done 2018    Vitamin D deficiency     Warts     hands, legs         Current Outpatient Medications:     albuterol (PROVENTIL HFA,VENTOLIN HFA) 90 mcg/act inhaler, Inhale 2 puffs every 6 (six) hours as needed for wheezing, Disp: , Rfl:     ascorbic acid (VITAMIN C) 500 mg tablet, Take 500 mg by mouth daily, Disp: , Rfl:     cephalexin (KEFLEX) 500 mg capsule, Take 1 capsule (500 mg total) by mouth every 6 (six) hours for 7 days, Disp: 28 capsule, Rfl: 0    docusate sodium (COLACE) 100 mg capsule, Take 1 capsule (100 mg total) by mouth 2 (two) times a day as needed (constipation per bowel protocol), Disp: 30 capsule, Rfl: 0    ferrous sulfate 325 (65 Fe) mg tablet, Take 1 tablet (325 mg total) by mouth 2 (two) times a day with meals, Disp: , Rfl: 0    ibuprofen (MOTRIN) 600 mg tablet, Take 1 tablet (600 mg total) by mouth every 6 (six) hours as needed for moderate pain, Disp: 30 tablet, Rfl: 0    Prenatal MV-Min-FA-Omega-3 (PRENATAL GUMMIES/DHA & FA) 0 4-32 5 MG CHEW, Chew 2 tablets daily, Disp: , Rfl:     No Known Allergies    Social History     Substance and Sexual Activity   Drug Use No       Social History Never a smoker    Interval Breastfeeding History:    Frequency of breast feeding: Attempting 1-2 times a day with very little success  Does mother feel breastfeeding is effective: No  Does infant appear satisfied after nursing:No  Stooling pattern normal: Yes  Urinating frequently:Yes  Using shield or shells: No    Alternative/Artificial Feedings:   Bottle: Yes, currently for every feeding  Cup: No  Syringe/Finger: No           Formula Type: Similac Advance occasionally when breastmilk is not available                     Amount: 2 5 ounces            Breast Milk:                      Amount: 2 5 ounces            Frequency Q 2-2 5  Hr between feedings  Elimination Problems: No      Equipment:  Nipple Shield             Type: none             Size: n/a             Frequency of Use: n/a  Pump            Type: Medela Freestyle            Frequency of Use: 3 times a day until today  Expressing about 4-8 ounces per session  Began pumping every 2-3 hours today    Shells            Type: none            Frequency of use: n/a    Equipment Problems: no    Mom:  Breast: Medium sized symmetrical breasts  Right breast tender in the upper medial quadrant and surface of the breast in that area is pink  Enlarged accessory breast tissue in both axilla about the size of an orange  Tender but improving since initial engorgement  Nipple Assessment in General: Normal: elongated/eraser, no discoloration and no damage noted  Mother's Awareness of Feeding Cues                 Recognizes: Yes                  Verbalizes: Yes  Support System: FOB, extended family  History of Breastfeeding: none  Changes/Stressors/Violence: Malachi Galeana is concerned about the pain associated with feeding at the breast and Anya's struggles to latch  Concerns/Goals: Geetha is feeling conflicted about she is feeling about breastfeeding  She is open to trying to improve latch today but may ultimately go to exclusive pumping or wean  Problems with Mom: Mastitis, painful damaged nipples  Physical Exam   Constitutional: She is oriented to person, place, and time  She appears well-developed and well-nourished  HENT:   Head: Normocephalic and atraumatic  Neck: Normal range of motion  Neck supple  Cardiovascular: Normal rate, regular rhythm and normal heart sounds  Pulmonary/Chest: Effort normal and breath sounds normal    Musculoskeletal: Normal range of motion  Neurological: She is alert and oriented to person, place, and time  Skin: Skin is warm and dry  Psychiatric: She has a normal mood and affect   Her behavior is normal  Judgment and thought content normal        Infant:  Behaviors: Alert  Color: Pink  Birth weight: 3629gram  Current weight: 3910gram    Problems with infant: Trouble with latch      General Appearance:  Alert, active, no distress                            Head:  Normocephalic, AFOF, sutures opposed                            Eyes:   Conjunctiva clear, no drainage                            Ears:   Normally placed, no anomolies                           Nose:   no drainage or erythema Mouth:  No lesions  Tongue extends beyond the lower lip, lateralizes well and tip elevates to mid mouth  Complete cupping of my finger while sucking with peristalsis originating at the tip of the tongue  Neck:  Supple, symmetrical, trachea midline                Respiratory:  No grunting, flaring, retractions, breath sounds clear and equal           Cardiovascular:  Regular rate and rhythm  No murmur  Adequate perfusion/capillary refill  Femoral pulse present                  Abdomen:    Soft, non-tender, no masses, bowel sounds present, no HSM            Genitourinary:  Normal female genitalia, anus patent                         Spine:   No abnormalities noted       Musculoskeletal:   Full range of motion         Skin/Hair/Nails:   Skin warm, dry, and intact, no rashes or abnormal dyspigmentation or lesions               Neurologic:   No abnormal movement, tone appropriate for gestational age     Latch:  Efficiency:               Lips Flanged: Yes              Depth of latch: wide              Audible Swallow: Yes              Visible Milk: Yes              Wide Open/ Asymmetrical: Yes              Suck Swallow Cycle: Breathing: unlabored, Coordinated: yes  Nipple Assessment after latch: Normal: elongated/eraser, no discoloration and no damage noted  Latch Problems: None  With good positioning Anya latched well after just a few attempts and began to suckle  She needed some encouragement to continue long enough to make milk flow  She nursed on both breasts until she was content  Geetha verbalized that she was comfortable during the feeding  Position:  Infant's Ergonomics/Body               Body Alignment: Yes               Head Supported: Yes               Close to Mom's body/ Lifted/ Supported: Yes               Mom's Ergonomics/Body: Yes                           Supported:  Yes                           Sitting Back: Yes                           Brings Baby to her breast: Yes  Positioning Problems: None      Handouts:   Paced bottle feeding, Hands on pumping, Hand expression and Latch Check List    Education:  Reviewed Latch: Demonstrated how to gently compress the breast and align the baby so that her nose is just above the nipple with her lower lip and chin touching the breast to encourage the deepest, widest, off-center latch  Reviewed Positioning for Dyad: Demonstrated how to position baby "bellyy to belly" with mom with her ear, shoulder and hip in alignment  Reviewed Frequency/Supply & Demand: Discussed how milk supply is established by frequently and effectively emptiyng the breasts  Reviewed Alternative/Artificial Feedings: Discussed and demonstrated paced bottle feeding  Reviewed Mom/Breast care: Warm moist compresses and gentle massage, frequent milk removal, take antibiotics as prescribed  Reviewed Equipment: Discussed the use and features of the Medela Freestyle and the elements of hands on pumping,       Plan:  Feed on demand paying close attention to positioning for an improved latch  Effective pumping when not feeding at the breast and paced bottle feeding of expressed milk as desired  Take antibiotics as prescribed  Follow up as scheduled  I have spent 90 minutes with Patient and family today in which greater than 50% of this time was spent in counseling/coordination of care regarding Patient and family education

## 2019-08-05 ENCOUNTER — TELEPHONE (OUTPATIENT)
Dept: POSTPARTUM | Facility: CLINIC | Age: 24
End: 2019-08-05

## 2019-08-05 NOTE — TELEPHONE ENCOUNTER
Ml Chaves has been primarily pumping since our last visit and has attempted to have Anya latch a few times  Some of the feedings were successful but some lead to pain and nipple soreness  Her symptoms of mastitis have resolved but she is still taking antibiotics  Geetha verbalizes that breastfeeding is very stressful for her and feels that it is taking away from her enjoyment of her new baby  She would like to wean at this time and is looking for guidance on how to proceed  Currently she is pumping 4-5 times a day  She is expressing about 4-9 ounces each session  We discussed how to gradually decrease the volumes of milk she is expressing each session and then gradually increase the interval between pumping sessions  I encouraged her to express for comfort as needed between pumping sessions to prevent engorgement  Ml Chaves has chosen to keep her scheduled appointment later this week for additional support as well

## 2019-08-08 ENCOUNTER — OFFICE VISIT (OUTPATIENT)
Dept: POSTPARTUM | Facility: CLINIC | Age: 24
End: 2019-08-08
Payer: COMMERCIAL

## 2019-08-08 VITALS — DIASTOLIC BLOOD PRESSURE: 76 MMHG | TEMPERATURE: 98.6 F | SYSTOLIC BLOOD PRESSURE: 122 MMHG

## 2019-08-08 DIAGNOSIS — R52 PAIN AGGRAVATED BY BREAST FEEDING: ICD-10-CM

## 2019-08-08 DIAGNOSIS — Z71.89 ENCOUNTER FOR BREAST FEEDING COUNSELING: Primary | ICD-10-CM

## 2019-08-08 DIAGNOSIS — O92.79 PAIN AGGRAVATED BY BREAST FEEDING: ICD-10-CM

## 2019-08-08 DIAGNOSIS — O92.70 LACTATION PROBLEM: ICD-10-CM

## 2019-08-08 PROCEDURE — 99404 PREV MED CNSL INDIV APPRX 60: CPT | Performed by: PEDIATRICS

## 2019-08-08 NOTE — PATIENT INSTRUCTIONS
Warm moist compresses, gentle massage/vibration prior to pumping  Cold compresses afterward  Consider therapeutic breast ultrasound if breast still feels blocked tomorrow  Offer the breast whenever you are comfortable doing so  Continue with paced bottle feeding of expressed milk or formula on demand  Continue to gradually decrease supply by decreasing the volumes pumped at each session and slowly decrease the number of times you are pumping each day  Speak with your OB about pseudophedrine if you are interested in accelerating the weaning process  If you reconsider weaning, call for additional instructions  Please call with any questions or concerns

## 2019-08-08 NOTE — PROGRESS NOTES
BREAST FEEDING FOLLOW UP VISIT    Informant/Relationship: Geetha    Discussion of General Lactation Issues: Geetha and Anya still struggle to achieve a comfortable effective latch  There have been a few effective feedings but Geetha is primarily pumping and bottle feeding her milk  She has chosen to wean due to the stressors of breastfeeding and has begun to taper down her pumping  She is feeding formula and expressed milk (she is storing some breastmilk for later use) For the last 24 hours she feels some tenderness in her left breast (lower quadrants) and feels she is unable to completely empty the breast when pumping  She denies chills or fever or feeling ill  She is still taking antibiotics for mastitis  Infant is 4 weeks old today  Interval Breastfeeding History:    Frequency of breast feeding: Attempting occasionally  Does mother feel breastfeeding is effective: Yes, when she is able to latch effectively  Does infant appear satisfied after nursing:Yes, when she is able to latch effectively  Stooling pattern normal:Yes  Urinating frequently:Yes  Using shield or shells:No    Alternative/Artificial Feedings:   Bottle: Yes, currently for every feeding  Cup: No  Syringe/Finger: No           Formula Type: similac advance                     Amount: 3 ounces once or twice a day  Breast Milk:                      Amount: 3 ounces            Frequency Q 2-3  Hr between feedings  Elimination Problems: No      Equipment:  Nipple Shield             Type: none             Size: n/a             Frequency of Use: n/a  Pump            Type: Medela Freestyle            Frequency of Use: 4-5 times a day  Expresses 4-8 ounces per session  Unable to completely empty the left beast for the last 24 hours  Shells            Type: none            Frequency of use: n/a    Equipment Problems: no    Mom:  Breast: Medium sized symmetrical breasts  Left breast tender in the lower quadrants  No palpable masses  Enlarged accessory breast tissue in both axilla about the size of an orange  Tender but improving since initial engorgement  Nipple Assessment in General: Normal: elongated/eraser, no discoloration and no damage noted  Mother's Awareness of Feeding Cues                 Recognizes: Yes                  Verbalizes: Yes  Support System: FOB, extended family  History of Breastfeeding: none  Changes/Stressors/Violence: Ann Jones is concerned about the pain associated with feeding at the breast and Anya's struggles to latch  Concerns/Goals: Geetha is feeling conflicted about she is feeling about breastfeeding  Today she is verbalizing that she chooses to wean but has asked for help getting Anya to the breast     Problems with Mom: Pain with feedings  Tender left breast   Unable to effectively empty the left breast for the last day  OBGyn Exam  Physical Exam   Constitutional: She is oriented to person, place, and time  She appears well-developed and well-nourished  HENT:   Head: Normocephalic and atraumatic  Neck: Normal range of motion  Neck supple  Cardiovascular: Normal rate, regular rhythm and normal heart sounds  Pulmonary/Chest: Effort normal and breath sounds normal    Musculoskeletal: Normal range of motion  Neurological: She is alert and oriented to person, place, and time  Skin: Skin is warm and dry  Psychiatric: She has a normal mood and affect  Her behavior is normal  Judgment and thought content normal       Infant:  Behaviors: Alert  Color: Pink  Birth weight: 3629gram  Current weight: 4165gram    Problems with infant: Trouble with latch    General Appearance:  Alert, active, no distress                            Head:  Normocephalic, AFOF, sutures opposed                            Eyes:   Conjunctiva clear, no drainage                            Ears:   Normally placed, no anomolies                           Nose:   no drainage or erythema                          Mouth:  No lesions    Tongue extends beyond the lower lip, lateralizes well and tip elevates to mid mouth  Complete cupping of my finger while sucking with peristalsis originating at the tip of the tongue  Neck:  Supple, symmetrical, trachea midline                Respiratory:  No grunting, flaring, retractions, breath sounds clear and equal           Cardiovascular:  Regular rate and rhythm  No murmur  Adequate perfusion/capillary refill  Femoral pulse present                  Abdomen:    Soft, non-tender, no masses, bowel sounds present, no HSM            Genitourinary:  Normal female genitalia, anus patent                         Spine:   No abnormalities noted       Musculoskeletal:   Full range of motion         Skin/Hair/Nails:   Skin warm, dry, and intact, no rashes or abnormal dyspigmentation or lesions               Neurologic:   No abnormal movement, tone appropriate for gestational age       Dewey Latch:  Efficiency:               Lips Flanged: Yes              Depth of latch: wide              Audible Swallow: Yes              Visible Milk: Yes              Wide Open/ Asymmetrical: Yes              Suck Swallow Cycle: Breathing: unlabored, Coordinated: yes  Nipple Assessment after latch: Normal: elongated/eraser, no discoloration and no damage noted  Latch Problems: Anya needed several attempts to achieve and maintain a deep latch  Geetha's positioning was good but Anya would latch, suck once, come off and cry  Geetha continued to try and eventually Anya sucked enough to stimulate milk flow and then nursed beautifully for an extended period on both breasts  During the feeding on the left breast, Mukesh Samayoa felt something "moving" in the lateral aspect of the breast and after the feeding her breast felt much more comfortable  Position:  Infant's Ergonomics/Body               Body Alignment: Yes               Head Supported: Yes               Close to Mom's body/ Lifted/ Supported:  Yes               Mom's Ergonomics/Body: Yes                           Supported: Yes                           Sitting Back: Yes                           Brings Baby to her breast: Yes  Positioning Problems: None      Handouts:   None    Education:  Reviewed Mom/Breast care: Warm moist compresses and gentle breast massage/vibration prior to pumping  Cold compresses after pumping  Consider therapeutic breast ultrasound if breast still feels blocked tomorrow  Plan:  Warm moist compresses, gentle massage/vibration prior to pumping  Cold compresses afterward  Consider therapeutic breast ultrasound if breast still feels blocked tomorrow  Offer the breast whenever you are comfortable doing so  Continue with paced bottle feeding of expressed milk or formula on demand  Continue to gradually decrease supply by decreasing the volumes pumped at each session and slowly decrease the number of times you are pumping each day  Speak with your OB about pseudophedrine if you are interested in accelerating the weaning process  I have spent 60 minutes with Patient and family today in which greater than 50% of this time was spent in counseling/coordination of care regarding Patient and family education

## 2019-08-09 ENCOUNTER — TELEPHONE (OUTPATIENT)
Dept: POSTPARTUM | Facility: CLINIC | Age: 24
End: 2019-08-09

## 2019-08-09 NOTE — TELEPHONE ENCOUNTER
Gatoyennifer Tavarez still has not determined if she plans to wean or not but is looking for more information regarding medication to help accelerate the process  She did feed Anya at the breast last night and her tender blocked area noted on exam yesterday has resolved  She spoke with her OB today about pseudophedrine and was told that she has no contraindications for taking the medication  She is asking what dose of the medication to take and for how long  I explained that it is out of my scope of practice to prescribe and that she should speak with her doctor for recommendations  I did review with her the research available in Dr Maureen Holland Medications and Mother's Milk  Geetha verbalized understanding and will call back with any additional questions or concerns

## 2019-08-12 ENCOUNTER — TELEPHONE (OUTPATIENT)
Dept: POSTPARTUM | Facility: CLINIC | Age: 24
End: 2019-08-12

## 2019-08-12 NOTE — TELEPHONE ENCOUNTER
I spoke with Dr Booker Jesus who recommends milk cultures at this time as well as continued caution during weaning to prevent engorgement  An appointment was scheduled to obtain the cultures and for additional evaluation

## 2019-08-12 NOTE — TELEPHONE ENCOUNTER
Geetha is continuing to wean from the pump  She took pseudephedrine for the first time last night  She noticed that her breasts did not feel as full this morning as they usually do  She is generally expressing less milk at each pumping session  The blocked area in the left breast has resolved  Her breasts now appear uniformly pink and will occasionally itch since Friday  The breasts do not feel engorged although they are tender in some areas  She does not have chills, fever or flu like symptoms  She has not used any new products on her skin  She completed the antibiotics she was taking for mastitis last Wednesday  She is unsure what is causing her symptoms or how to resolve them  I encouraged Geetha to continue with her current plan  She can use cool compresses after pumping if she feels that helps  I will speak with Dr Joe Negrete for additional instructions

## 2019-08-12 NOTE — TELEPHONE ENCOUNTER
Geetha called - seen you several times - weaning off breastfeeding & pumping - she noticed this weekend her breasts are redish/pinkish color & itchy - feels warm to touch - no fever or chills at this time - had mastitis & finished the antibiotics - said doesn't feel like that

## 2019-08-14 ENCOUNTER — OFFICE VISIT (OUTPATIENT)
Dept: POSTPARTUM | Facility: CLINIC | Age: 24
End: 2019-08-14
Payer: COMMERCIAL

## 2019-08-14 ENCOUNTER — APPOINTMENT (OUTPATIENT)
Dept: LAB | Facility: HOSPITAL | Age: 24
End: 2019-08-14
Attending: PEDIATRICS
Payer: COMMERCIAL

## 2019-08-14 ENCOUNTER — OFFICE VISIT (OUTPATIENT)
Dept: FAMILY MEDICINE CLINIC | Facility: CLINIC | Age: 24
End: 2019-08-14
Payer: COMMERCIAL

## 2019-08-14 ENCOUNTER — TELEPHONE (OUTPATIENT)
Dept: FAMILY MEDICINE CLINIC | Facility: CLINIC | Age: 24
End: 2019-08-14

## 2019-08-14 VITALS
HEART RATE: 87 BPM | TEMPERATURE: 97 F | WEIGHT: 162.6 LBS | HEIGHT: 63 IN | RESPIRATION RATE: 16 BRPM | DIASTOLIC BLOOD PRESSURE: 88 MMHG | SYSTOLIC BLOOD PRESSURE: 124 MMHG | OXYGEN SATURATION: 98 % | BODY MASS INDEX: 28.81 KG/M2

## 2019-08-14 VITALS — TEMPERATURE: 97.7 F | SYSTOLIC BLOOD PRESSURE: 124 MMHG | DIASTOLIC BLOOD PRESSURE: 84 MMHG

## 2019-08-14 DIAGNOSIS — N60.12 CHRONIC MASTITIS OF LEFT BREAST: ICD-10-CM

## 2019-08-14 DIAGNOSIS — F41.9 ANXIETY: ICD-10-CM

## 2019-08-14 DIAGNOSIS — Q83.1 ACCESSORY BREAST TISSUE OF AXILLA: Primary | ICD-10-CM

## 2019-08-14 DIAGNOSIS — F33.1 MODERATE EPISODE OF RECURRENT MAJOR DEPRESSIVE DISORDER (HCC): Primary | ICD-10-CM

## 2019-08-14 DIAGNOSIS — N60.11 CHRONIC MASTITIS OF RIGHT BREAST: Primary | ICD-10-CM

## 2019-08-14 PROCEDURE — 99404 PREV MED CNSL INDIV APPRX 60: CPT | Performed by: PEDIATRICS

## 2019-08-14 PROCEDURE — 87205 SMEAR GRAM STAIN: CPT

## 2019-08-14 PROCEDURE — 1036F TOBACCO NON-USER: CPT | Performed by: FAMILY MEDICINE

## 2019-08-14 PROCEDURE — 87070 CULTURE OTHR SPECIMN AEROBIC: CPT

## 2019-08-14 PROCEDURE — 3008F BODY MASS INDEX DOCD: CPT | Performed by: FAMILY MEDICINE

## 2019-08-14 PROCEDURE — 99214 OFFICE O/P EST MOD 30 MIN: CPT | Performed by: FAMILY MEDICINE

## 2019-08-14 PROCEDURE — 87186 SC STD MICRODIL/AGAR DIL: CPT

## 2019-08-14 RX ORDER — PSEUDOEPHEDRINE HYDROCHLORIDE 30 MG/1
30 TABLET ORAL EVERY 8 HOURS PRN
COMMUNITY
End: 2019-09-10 | Stop reason: ALTCHOICE

## 2019-08-14 RX ORDER — CITALOPRAM 20 MG/1
20 TABLET ORAL DAILY
Qty: 30 TABLET | Refills: 1 | Status: SHIPPED | OUTPATIENT
Start: 2019-08-14 | End: 2019-10-23 | Stop reason: SDUPTHER

## 2019-08-14 NOTE — PATIENT INSTRUCTIONS
·         [address] [facility] [address], [place], [address]     [phone]      Hotlines:   program these numbers into your phone in case you need them    Crisis Line:  Rebekah [phone],  [facility] [phone]  WarmLine:  Alyx Santamaria [address] Suicide Prevention Lifeline:  [address] [facility] [phone]

## 2019-08-14 NOTE — TELEPHONE ENCOUNTER
Placed call to patient and informed her that she will be able to get in, in around a week but she needs to call and schedule  Patient stated she will call and schedule

## 2019-08-14 NOTE — PATIENT INSTRUCTIONS
Continue with conservative approach to weaning to prevent engorgement  Warm moist compresses prior to pumping and cool compresses after  Apply warmed protective ointment or olive oil to your sore nipple and cover with wax paper after pumping to help heal and resolve pain  Take over the counter pain medication such as ibuprofen if approved by your physician  Milk cultures pending  We will call with results when available and give further instructions  Call if your symptoms worsen or you develop chills, fever or redness and extreme tenderness in your breasts  Call with any questions or concerns

## 2019-08-14 NOTE — TELEPHONE ENCOUNTER
Saw pt today for flare of depression/anxiety after her baby was born one month ago  Unable to see therapist recommended by baby and me until sept   Please call [facility] to see if they can get her in for an appointment soon

## 2019-08-14 NOTE — PROGRESS NOTES
Assessment/Plan:      1  Moderate episode of recurrent major depressive disorder (HCC)  - citalopram (CeleXA) 20 mg tablet; Take 1 tablet (20 mg total) by mouth daily  Dispense: 30 tablet; Refill: 1    2  Anxiety  - citalopram (CeleXA) 20 mg tablet; Take 1 tablet (20 mg total) by mouth daily  Dispense: 30 tablet; Refill: 1     Total visit time 25 minutes and greater than 50% spent counseling/coordinating care of the patient regarding above  Mood has flared since having baby  Treatment options reviewed  Recommend we try to get her a sooner counseling appointment  She agrees to us calling to look into this  Also agrees to restarting Celexa for mood  Reviewed pros and cons and side effects  She is no longer breast feeding  Crisis numbers given  She will call if she is feelign worse  Close follow up in 4 weeks, sooner if needed  Patient Instructions          ·         [address] [place] [address] 36, [place], [address]     [phone]      Hotlines:   program these numbers into your phone in case you need them  Crisis Line:  [facility] [place] [phone],  [facility] [phone]  WarmLine:  [phone]  National Suicide Prevention Lifeline:  [address] [facility] [phone]        Return in about 1 month (around 9/14/2019) for mood check   HPI  Melvin Wong is a 30 y o  female who presents for follow up on mood  HPI     Follow-up      Additional comments: mood f/u,           Last edited by Gita Guzman MA on 8/14/2019 12:08 PM  (History)        refer to Dr Humza Tim 2 -3 months after nursing   Today:    Pt here today for her daughter's 1 month WCV and has a positive Valentine screen - score of 12  Answered 1 to question #10  Says she has had fleeting thoughts of suicide - never a plan  Has never tried in the past but had thoughts in the past of driving her car off the road   She has not had thoughts of harming her baby, but does have thoughts about other people harming their baby  She thinks for a moment of not caring for her baby when she cries, but then always does care for her  She talks opening with her  about this     Admits to some depression   overwhemed quickly  Found breast feeding too much and made a decision to stop       Took celexa a few years after her mother  and saw therapist when her mother   Stopped the med before she got pregnant because she felt she ws doing fine and did not need it  Tolerated celexa well       No flowsheet data found  No flowsheet data found  The following portions of the patient's history were reviewed and updated as appropriate: allergies, current medications, past family history, past medical history, past social history, past surgical history and problem list   Review of Systems   see HPI    Objective:    /88   Pulse 87   Temp (!) 97 °F (36 1 °C) (Tympanic)   Resp 16   Ht 5' 2 6" (1 59 m)   Wt 73 8 kg (162 lb 9 6 oz)   SpO2 98%   BMI 29 17 kg/m²     Current Outpatient Medications   Medication Sig Dispense Refill    albuterol (PROVENTIL HFA,VENTOLIN HFA) 90 mcg/act inhaler Inhale 2 puffs every 6 (six) hours as needed for wheezing      ascorbic acid (VITAMIN C) 500 mg tablet Take 500 mg by mouth daily      docusate sodium (COLACE) 100 mg capsule Take 1 capsule (100 mg total) by mouth 2 (two) times a day as needed (constipation per bowel protocol) 30 capsule 0    ferrous sulfate 325 (65 Fe) mg tablet Take 1 tablet (325 mg total) by mouth 2 (two) times a day with meals  0    ibuprofen (MOTRIN) 600 mg tablet Take 1 tablet (600 mg total) by mouth every 6 (six) hours as needed for moderate pain 30 tablet 0    Prenatal MV-Min-FA-Omega-3 (PRENATAL GUMMIES/DHA & FA) 0 4-32 5 MG CHEW Chew 2 tablets daily      pseudoephedrine (SUDAFED) 30 mg tablet Take 30 mg by mouth every 8 (eight) hours as needed for congestion      citalopram (CeleXA) 20 mg tablet Take 1 tablet (20 mg total) by mouth daily 30 tablet 1     No current facility-administered medications for this visit  Physical Exam:  Constitutional: she appears well-developed and well-nourished  Psychiatric: she has a depressed mood and affect  her speech is normal and behavior are normal  Judgment and thought content normal  Cognition and memory are normal  She is very loving and caring toward her daughter  Quickly attends to her needs

## 2019-08-14 NOTE — TELEPHONE ENCOUNTER
Placed call to [facility] and they stated they will be able to get her in, in about a week but patient needs to call to schedule because they need to ask some personal questions

## 2019-08-14 NOTE — TELEPHONE ENCOUNTER
Will give Geetha referral to breast surgeon while she is here with her daughter today  She has chosen to stop breastfeeding

## 2019-08-14 NOTE — PROGRESS NOTES
BREAST FEEDING FOLLOW UP VISIT    Informant/Relationship: Geetha    Discussion of General Lactation Issues: Judy Webber has been experiencing dull achy pain in her breasts for a few days  The blocked area in the lower quadrants of the left breast noted last week have improved but some tenderness persists  She denies and chills, fever or flu like symptoms  Since early this AM she is also experiencing "stinging" pain in her right breast  She is actively trying to decrease her milk supply by slowly reducing pumping volumes and frequency  She has taken pseudephedrine a few times as well  She does occasionally nurse Anya in an effort to more fully empty the breasts when she feels an area is blocked  Infant is 2 month old today  Interval Breastfeeding History:    Frequency of breast feeding: Occasionally  Does mother feel breastfeeding is effective: Yes  Does infant appear satisfied after nursing:Yes  Stooling pattern normal:Yes  Urinating frequently:Yes  Using shield or shells:No    Alternative/Artificial Feedings:   Bottle: Yes, Currently for most feeding  Cup: No  Syringe/Finger: No           Formula Type: Similac Advance                     Amount: 4 ounces every other feeding            Breast Milk:                      Amount: 4 ounces every other feeding            Frequency Q 2 Hr between feedings  Elimination Problems: No      Equipment:  Nipple Shield             Type: none             Size: n/a             Frequency of Use: n/a  Pump            Type: Medela Freestyle            Frequency of Use: 4 times a day  Expresses about 4-6 ounces per session  Shells            Type: none            Frequency of use: n/a    Equipment Problems: no      Mom:  Breast: Medium sized symmetrical breasts  Both breasts tender on palpation in all quadrants  No palpable blocked areas  No erythema  Enlarged accessory breast tissue in both axilla about the size of a small orange  They are tender on palpation today    Nipple Assessment in General: Everted nipples bilaterally  Small white flat area on the face of the right nipple with surrounding erythema  Edgardo Rothman admits that this became tender last night when she unintentionally scratched the area  Mother's Awareness of Feeding Cues                 Recognizes: Yes                  Verbalizes: Yes  Support System: FOB, extended family  History of Breastfeeding: none  Changes/Stressors/Violence: Edgardo Rothman is stressed about her ongoing issues with breast pain  She is feeling more relaxed since making the decision to wean  Concerns/Goals: Geetha would like to resolve her pain and continue to wean    Problems with Mom: Breast pain    Physical Exam   Constitutional: She is oriented to person, place, and time  She appears well-developed and well-nourished  HENT:   Head: Normocephalic and atraumatic  Neck: Normal range of motion  Neck supple  Pulmonary/Chest: Effort normal    Musculoskeletal: Normal range of motion  Neurological: She is alert and oriented to person, place, and time  Skin: Skin is warm and dry  Psychiatric: She has a normal mood and affect  Her behavior is normal  Judgment and thought content normal            Handouts:   None    Education:  Reviewed Mom/Breast care: Reviewed conservative weaning approach, comfort measures for breast pain, treatment of sore nipple and need for milk cultures  Plan:  Continue with conservative plan for weaning  Warm moist compresses prior to pumping and cool compresses after  Milk cultures pending  Protective ointment and occlusive dressing for treatment of damaged nipple  OTC pain medication as approved by her OB provider  Will discuss additional medication options to accelerate weaning with Dr Mariam Campos  I have spent 60 minutes with Patient  today in which greater than 50% of this time was spent in counseling/coordination of care regarding Patient and family education

## 2019-08-16 ENCOUNTER — TELEPHONE (OUTPATIENT)
Dept: OBGYN CLINIC | Facility: CLINIC | Age: 24
End: 2019-08-16

## 2019-08-16 NOTE — TELEPHONE ENCOUNTER
----- Message from Roxanna Archuleta RN sent at 2019 11:06 AM EDT -----  Girl    ----- Message -----  From: Johanna Hand DO  Sent: 2019   6:34 AM EDT  To: Valeri Sacks, CASIMIRO, #    Hi! Nany Diamond had an uncomplicated  with me yesterday, 7/15  She does need to come in for a nurse visit BP check in 1 week and then again in 6 weeks with me for a postpartum visit  Thanks!   Kaylyn Wilcox

## 2019-08-17 LAB
BACTERIA SPEC BFLD CULT: ABNORMAL
BACTERIA SPEC BFLD CULT: ABNORMAL
GRAM STN SPEC: ABNORMAL
GRAM STN SPEC: ABNORMAL

## 2019-08-19 DIAGNOSIS — N61.0 MASTITIS: Primary | ICD-10-CM

## 2019-08-19 RX ORDER — SULFAMETHOXAZOLE AND TRIMETHOPRIM 800; 160 MG/1; MG/1
1 TABLET ORAL EVERY 12 HOURS SCHEDULED
Qty: 20 TABLET | Refills: 1 | Status: SHIPPED | OUTPATIENT
Start: 2019-08-19 | End: 2019-08-29

## 2019-08-19 NOTE — TELEPHONE ENCOUNTER
I spoke with Yumikodragan Guillen regarding her positive milk culture results  She does report she is still having intermittent breast pain and at times feels tired and a little sick  I reviewed with her the medication prescribed by Dr Cristóbal Marinelli and the recommended dose  I explained she should take the medication as directed for 10 days and if her symptoms have not completely resolved, she should refill for an additional 10 day course  If she is still having pain after two courses of treatment, she will need additional evaluation  I also reviewed Dr Sophia Collazo instructions to take Pseudephedrine 60mg TID to help accelerate weaning  Geetha verbalized understanding and will call back with any additional questions or concerns

## 2019-08-19 NOTE — TELEPHONE ENCOUNTER
Geetha called - looking for update on her breast culture result  Wanted to see what Dr Jen Urrutia could recommend on medications fast track for weaning

## 2019-08-20 ENCOUNTER — TELEPHONE (OUTPATIENT)
Dept: HEMATOLOGY ONCOLOGY | Facility: CLINIC | Age: 24
End: 2019-08-20

## 2019-08-20 NOTE — TELEPHONE ENCOUNTER
New Patient Encounter      New Patient Intake Form   Patient Details:  Carlos Eduardo Rojo  1995  [id number]    Background Information:  [address] starts by opening a telephone encounter and gathering the following information   Who is calling to schedule? If not self, relationship to patient? self   Referring Provider Vani Bella   What is the diagnosis? excessary breast tissue   When was the diagnosis? During pregnancy    Is patient aware of diagnosis? Yes   Reason for visit? NP DX   Have you had any testing done? If so: when, where? No   Are records in EPIC? No   Was the patient told to bring a disk? N/A   Scheduling Information:   Preferred [place]:  Pratima Chan   Requesting Specific Provider? Dr Spike Martines   Are there any dates/time the patient cannot be seen? no   Counseling Pre-Screen:  If the patient answers YES to any of the below questions and has a cancer diagnosis, please route to the appropriate location specific counselor    Have you felt anxious or worried about your diagnosis and/or the treatment you are receiving? Yes   Has your diagnosis caused physical, emotional, or financial hardship for you? Yes, physical   Intermittent pain , on/off   Do you anticipate any transportation issues to get to your appointment? No   Miscellaneous: Per Dr Spike Martines she will see the pt  Non Urgent appt  After completing the above information, please route to Financial Counselor and the appropriate Nurse Navigator for review

## 2019-08-20 NOTE — TELEPHONE ENCOUNTER
How are you feeling?pretty good  Are you having any vaginal bleeding?yes less flow than a normal period  Have you had any problems voiding?no  Have you had any problems moving your bowels?no  Type of Delivery?   Vaginal delivery - any issues with healing? No   - is your incision healing well? NA  Is there any redness or drainage? NA      I see you had a baby : female  How is the baby doing? Really well  Are you breast/bottle feeding? Breast and Bottle  How is that going? Weaning to bottle - issues with mastitis  Have you seen lactation consultant? yes    Are you having any baby blues? yes -has an appt with a [facility] for counseling  Do you have any help at home?yes  EPDS - 7    Follow up appointment scheduled - 19  Patient advised to call the office for abnormal bleeding, mastitis, infection, any signs of postpartum depression  Patient verbalized understanding  Routing to provider to update

## 2019-08-26 ENCOUNTER — SOCIAL WORK (OUTPATIENT)
Dept: BEHAVIORAL/MENTAL HEALTH CLINIC | Facility: CLINIC | Age: 24
End: 2019-08-26
Payer: COMMERCIAL

## 2019-08-26 ENCOUNTER — PATIENT MESSAGE (OUTPATIENT)
Dept: FAMILY MEDICINE CLINIC | Facility: CLINIC | Age: 24
End: 2019-08-26

## 2019-08-26 PROCEDURE — 90834 PSYTX W PT 45 MINUTES: CPT | Performed by: SOCIAL WORKER

## 2019-08-26 NOTE — PSYCH
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 [place] - all her family in [facility], in laws in [place]  Raised by both parents - mom passed away almost 7yrs ago - had cancer - sick for over a year  Pt is 2nd of five - youngest sibling was 1yo when mom passed away  Older brother and she one yr apart then sister, 7yrs younger then 4yrs then another 4yrs  Had 3 miscarriages - mom loved having kids  Dad will do anything for anyone but can be manipulative - they get along with him but are very careful about interactions  When mom got sick pt helped raised kids  Brother best friends with   First pregnancy and planned   for 2yrs  2nd grandchild  Brother has one yr old  First grandchild on in law's side  Great relationship with mil - first girl in family - spoiled  Pt had counseling after mom passed away - outpatient only, not hospitalized/psychosis/denies suicidal/homicidal ideations now but has thoughts of suicide after mom passed away - no plan or intent and none since, denies addictions/self harm   drinks nightly and pt doesn't like - no DUI, not drunk, no domestic violence, safety issues  Pt social drinker - rare      Lactation/breastfeeding issues - mastitis - chronic - other infection - antibiotics multiple times, difficulty latching  Was pumping and bottle feeding then frozen milk    Using formula and some frozen milk  Gassy since formula and cries at night - trying gas drops, massage and bicycle legs, burping - called doctor - feels since 100% formula  Gaining weight  Going to bathroom ok  Main reason for coming in due to feeling overwhelmed easily since mom passed away, baby has increased feelings  Crying easily, enjoys activities but when very overwhelmed, depressed feeling sets in - lactation issues made pt feel depression  Pt felt torn between pumping and other needs  Felt slightly disconnected to baby at first but more connected now  Starting to smile and make noises  A times baby will sleep 5-7hrs but then waking every 2hrs at times  Pt started Celexa about 5 days ago  Took before and was helpful - some fatigue with it  Denies anxiety - feels more sadness  Baby in crib in own room due to moving a lot in bassinet - rolling over now - uses monitor  Pt considering birth control pills for birth control  Did not like IUD last time - pain  HPI    Review of Systems      Objective:     Physical Exam  oriented, engaged, calm, somewhat sad, full range affect, good eye contact, appropriate speech, denies suicidal/homicidal ideations/psychosis/desire to harm baby, fair insight/judgement, positive interaction with baby - holding/feeding/burping

## 2019-08-26 NOTE — TELEPHONE ENCOUNTER
From: Yoon Cristobal  To: Hang Hess DO  Sent: 8/26/2019 12:03 AM EDT  Subject: Non-Urgent Medical Question    Dr Efrem Denton,    Within the past 2 weeks, my  and I have been having a really hard time with Anya at in the late afternoon and into the night  She gets really gassy, and really fussy  I don't know if its just a coincidence, but it seems to have started right around the time we started her on all formula and no breastmilk  I don't know if it could be an allergy or intolerance to the formula  She is currently drinking the Similac Pro Advance, but we have samples of the Similac Pro Sensitive and Pro Total Comfort that we could try  I've also been doing some reading, and am now suspecting that maybe its colic? We've tried the mylicon drops, and it seems like it helps somewhat, she usually begins to pass gas shortly after we give it to her  We just aren't sure what to do, and what is actually going on with her

## 2019-09-10 ENCOUNTER — POSTPARTUM VISIT (OUTPATIENT)
Dept: OBGYN CLINIC | Facility: CLINIC | Age: 24
End: 2019-09-10

## 2019-09-10 VITALS — DIASTOLIC BLOOD PRESSURE: 84 MMHG | SYSTOLIC BLOOD PRESSURE: 120 MMHG | BODY MASS INDEX: 28.35 KG/M2 | WEIGHT: 158 LBS

## 2019-09-10 DIAGNOSIS — Z30.011 ENCOUNTER FOR INITIAL PRESCRIPTION OF CONTRACEPTIVE PILLS: ICD-10-CM

## 2019-09-10 DIAGNOSIS — O34.90: ICD-10-CM

## 2019-09-10 PROCEDURE — 99024 POSTOP FOLLOW-UP VISIT: CPT | Performed by: OBSTETRICS & GYNECOLOGY

## 2019-09-10 RX ORDER — DROSPIRENONE AND ETHINYL ESTRADIOL 0.02-3(28)
1 KIT ORAL DAILY
Qty: 84 TABLET | Refills: 4 | Status: SHIPPED | OUTPATIENT
Start: 2019-09-10 | End: 2019-09-17

## 2019-09-10 NOTE — PROGRESS NOTES
Assessment/Plan     Normal postpartum exam      1  Contraception: OCP (estrogen/progesterone)  2  Annual exam due in February  Pap due 2/2020  3  Lactation consult, [address] information discussed  4  Increase activity as tolerated, may resume all normal activity  5  Anticipated return to work: 6 - 12 weeks post partum  6  Pelvic floor PT referral placed at patient request    Mary Alice Blackmon is a 30 y o  female who presents for a postpartum visit  She is 6 weeks postpartum following a spontaneous vaginal delivery  I have fully reviewed the prenatal and intrapartum course  The delivery was at 44 gestational weeks  Anesthesia: epidural  Laceration: 2nd degree  Bleeding no bleeding  Bowel function is normal  Bladder function is normal  Patient has not been sexually active  Desired contraception method is OCP (estrogen/progesterone)     Postpartum depression screening: negative  EPDS : 4    Baby's course has been uncomplicated  Baby is feeding by bottle - formula    Last Pap : 2/2017 ; no abnormalities  Gestational Diabetes: no  Pregnancy Complications: none    The following portions of the patient's history were reviewed and updated as appropriate: allergies, current medications, past family history, past medical history, past social history and past surgical history        Current Outpatient Medications:     albuterol (PROVENTIL HFA,VENTOLIN HFA) 90 mcg/act inhaler, Inhale 2 puffs every 6 (six) hours as needed for wheezing, Disp: , Rfl:     ascorbic acid (VITAMIN C) 500 mg tablet, Take 500 mg by mouth daily, Disp: , Rfl:     citalopram (CeleXA) 20 mg tablet, Take 1 tablet (20 mg total) by mouth daily, Disp: 30 tablet, Rfl: 1    ferrous sulfate 325 (65 Fe) mg tablet, Take 1 tablet (325 mg total) by mouth 2 (two) times a day with meals, Disp: , Rfl: 0    ibuprofen (MOTRIN) 600 mg tablet, Take 1 tablet (600 mg total) by mouth every 6 (six) hours as needed for moderate pain, Disp: 30 tablet, Rfl: 0    Prenatal MV-Min-FA-Omega-3 (PRENATAL GUMMIES/DHA & FA) 0 4-32 5 MG CHEW, Chew 2 tablets daily, Disp: , Rfl:     No Known Allergies    Review of Systems  Constitutional: no fever, feels well  Breasts: no complaints of breast pain, breast lump, or nipple discharge  Gastrointestinal: no complaints nausea, vomiting  Genitourinary: as noted in HPI  Neurological: no complaints of headache      Objective      /84   Wt 71 7 kg (158 lb)   LMP  (Approximate) Comment: n/a absent since    Breastfeeding?  No   BMI 28 35 kg/m²     OBGyn Exam  General: alert and oriented, in no acute distress  Abdomen: soft and nondistended  Vulva: normal, Bartholin's, Urethra, [place]'s normal, perineum well healed  Vagina: normal mucosa  Cervix: no cervical motion tenderness  Uterus:  normal size, mobile, non-tender  Adnexa: no mass, fullness, tenderness

## 2019-09-10 NOTE — LETTER
September 10, 2019     Patient: Rudolph Jason   YOB: 1995   Date of Visit: 9/10/2019       To Whom it May Concern:    Rudolph Jason is under my professional care  She was seen in my office on 9/10/2019  She may safely have massages  If you have any questions or concerns, please don't hesitate to call           Sincerely,          Danish Garrison DO        CC: Rudolph Jason

## 2019-09-12 ENCOUNTER — CONSULT (OUTPATIENT)
Dept: SURGICAL ONCOLOGY | Facility: CLINIC | Age: 24
End: 2019-09-12
Payer: COMMERCIAL

## 2019-09-12 VITALS
TEMPERATURE: 98.2 F | RESPIRATION RATE: 16 BRPM | WEIGHT: 158 LBS | HEIGHT: 63 IN | BODY MASS INDEX: 28 KG/M2 | DIASTOLIC BLOOD PRESSURE: 82 MMHG | SYSTOLIC BLOOD PRESSURE: 130 MMHG | HEART RATE: 84 BPM

## 2019-09-12 DIAGNOSIS — Q83.1 ACCESSORY BREAST IN FEMALE: Primary | ICD-10-CM

## 2019-09-12 PROCEDURE — 99203 OFFICE O/P NEW LOW 30 MIN: CPT | Performed by: SURGERY

## 2019-09-12 NOTE — PROGRESS NOTES
Breast Consultation-Surgical Oncology     [address] [facility]  [facility] [place]  [address] 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            HPI: Shahab Woodward is a 30y o  year old female who presents with bilateral accessory breast tissue  Pt is post partum 8 weeks  She breast fed for 1 month  She stopped 19  She has felt some improvement in the axilla since she has delivered and stopped breast feeding  There is no redness in axilla  Surgical treatment to date consisted of not applicable  Oncology History:     No history exists         Pertinent reproductive history:  Age at menarche:  13  OB History        1    Para   1    Term   1       0    AB   0    Living   1       SAB   0    TAB   0    Ectopic   0    Multiple   0    Live Births   1           Obstetric Comments   Menarche 13  Hx of BCP x 9 years   Hx of IUD x 1 year            Age at first live birth:  30  Age at menopause:  Not applicable  Hysterectomy/Oophrectomy:  No  Hormone replacement therapy:  No  Birth control pills:  Yes    Problem List:   Patient Active Problem List   Diagnosis    Vitamin D deficiency    Anxiety    Chronic gastritis without bleeding    Exercise-induced asthma    Chronic fatigue    Elevated LFTs     Past Medical History:   Diagnosis Date    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     Anemia     Anxiety     stopped medication over 1 year ago    Asthma     prn inhaler, excercise induced    Carpal tunnel syndrome during pregnancy     right wrist , no brace    Depression     Esophagitis     Gastritis     Headache     IBS (irritable bowel syndrome)     Night sweats     Pneumonia     Shoulder pain     Varicella     had chicken pox as child/immune -blood work levels done 2018    Vitamin D deficiency     Warts hands, legs     Past Surgical History:   Procedure Laterality Date    LA COLONOSCOPY FLX DX W/COLLJ SPEC WHEN PFRMD N/A 2/21/2017    Procedure: EGD AND COLONOSCOPY;  Surgeon: Karla Quinones MD;  [facility]; Service: Gastroenterology    SKIN GRAFT Left     left hand    WISDOM TOOTH EXTRACTION       Family History   Problem Relation Age of Onset    Leukemia Mother 40    Hypertension Father         Essential    Heart disease Father     Sleep apnea Father     Gout Father     Eczema Sister     No Known Problems Brother     No Known Problems Maternal Grandmother     Cancer Maternal Grandfather         skin    Stroke Maternal Grandfather     Heart attack Paternal Grandmother     Deep vein thrombosis Paternal Grandmother     Diabetes Paternal Grandfather     Cancer Paternal Grandfather         lump in neck    No Known Problems Sister     No Known Problems Brother     Diabetes Other     No Known Problems Daughter      Social History     Socioeconomic History    Marital status: /Civil Union     Spouse name: Shara Cho    Number of children: Not on file    Years of education: Some college    Highest education level: Not on file   Occupational History    Occupation:      Comment: Working part-time   Social Needs    Financial resource strain: Not on file    Food insecurity:     Worry: Not on file     Inability: Not on file   Zipscene needs:     Medical: Not on file     Non-medical: Not on file   Tobacco Use    Smoking status: Never Smoker    Smokeless tobacco: Never Used   Substance and Sexual Activity    Alcohol use:  Yes     Alcohol/week: 3 0 standard drinks     Types: 1 Glasses of wine, 1 Cans of beer, 1 Shots of liquor per week     Comment: socially    Drug use: Never    Sexual activity: Not Currently     Partners: Male     Birth control/protection: None   Lifestyle    Physical activity:     Days per week: Not on file     Minutes per session: Not on file    Stress: Not on file   Relationships    Social connections:     Talks on phone: Not on file     Gets together: Not on file     Attends Caodaism service: Not on file     Active member of club or organization: Not on file     Attends meetings of clubs or organizations: Not on file     Relationship status: Not on file    Intimate partner violence:     Fear of current or ex partner: Not on file     Emotionally abused: Not on file     Physically abused: Not on file     Forced sexual activity: Not on file   Other Topics Concern    Not on file   Social History Narrative     to Yanet Hanson at [employer] - not working now, plans to stay home with baby     Baby due July 17, dtr      Current Outpatient Medications   Medication Sig Dispense Refill    albuterol (PROVENTIL HFA,VENTOLIN HFA) 90 mcg/act inhaler Inhale 2 puffs every 6 (six) hours as needed for wheezing      citalopram (CeleXA) 20 mg tablet Take 1 tablet (20 mg total) by mouth daily 30 tablet 1    drospirenone-ethinyl estradiol (ALEXANDER) 3-0 02 MG per tablet Take 1 tablet by mouth daily 84 tablet 4    ibuprofen (MOTRIN) 600 mg tablet Take 1 tablet (600 mg total) by mouth every 6 (six) hours as needed for moderate pain 30 tablet 0    Prenatal MV-Min-FA-Omega-3 (PRENATAL GUMMIES/DHA & FA) 0 4-32 5 MG CHEW Chew 2 tablets daily      ascorbic acid (VITAMIN C) 500 mg tablet Take 500 mg by mouth daily      ferrous sulfate 325 (65 Fe) mg tablet Take 1 tablet (325 mg total) by mouth 2 (two) times a day with meals (Patient not taking: Reported on 9/12/2019)  0     No current facility-administered medications for this visit  No Known Allergies      The following portions of the patient's history were reviewed and updated as appropriate: allergies, current medications, past family history, past medical history, past social history, past surgical history and problem list     Review of Systems:  Review of Systems   Constitutional: Negative  Negative for appetite change and fever  Eyes: Negative  Respiratory: Negative for shortness of breath  Cardiovascular: Negative  Gastrointestinal: Negative  Endocrine: Negative  Genitourinary: Negative  Musculoskeletal: Positive for back pain  Negative for arthralgias and myalgias  Skin: Negative  Allergic/Immunologic: Negative  Neurological: Positive for headaches  Hematological: Negative  Negative for adenopathy  Does not bruise/bleed easily  Psychiatric/Behavioral: Negative  Physical Exam:  Physical Exam   Constitutional: She is oriented to person, place, and time  She appears well-developed and well-nourished  HENT:   Head: Normocephalic and atraumatic  Pulmonary/Chest: Right breast exhibits no inverted nipple, no mass, no nipple discharge, no skin change and no tenderness  Left breast exhibits no inverted nipple, no mass, no nipple discharge, no skin change and no tenderness  Accessory tissue in the bilateral axilla       Lymphadenopathy:        Right axillary: No pectoral and no lateral adenopathy present  Left axillary: No pectoral and no lateral adenopathy present  Right: No supraclavicular adenopathy present  Left: No supraclavicular adenopathy present  Neurological: She is alert and oriented to person, place, and time  Psychiatric: She has a normal mood and affect  Laboratory:      Imaging:  none        Discussion/Summary:  20-year-old female who is roughly eight weeks postpartum  She developed swelling in the bilateral axilla during her pregnancy  She was nursing for a short period but stopped roughly three weeks ago  She states that the area has gone down since then  On examination today she continues to have prominent swelling in the axilla bilateral which is consistent with accessory breast tissue  This will likely continue to resolve  She continues to have lactational changes    I will see her again in three months for follow-up exam or sooner should the need arise

## 2019-09-17 ENCOUNTER — OFFICE VISIT (OUTPATIENT)
Dept: FAMILY MEDICINE CLINIC | Facility: CLINIC | Age: 24
End: 2019-09-17
Payer: COMMERCIAL

## 2019-09-17 VITALS
RESPIRATION RATE: 16 BRPM | OXYGEN SATURATION: 98 % | BODY MASS INDEX: 27.82 KG/M2 | HEIGHT: 63 IN | TEMPERATURE: 97.2 F | HEART RATE: 62 BPM | DIASTOLIC BLOOD PRESSURE: 80 MMHG | WEIGHT: 157 LBS | SYSTOLIC BLOOD PRESSURE: 116 MMHG

## 2019-09-17 DIAGNOSIS — F41.9 ANXIETY: ICD-10-CM

## 2019-09-17 DIAGNOSIS — F33.1 MODERATE EPISODE OF RECURRENT MAJOR DEPRESSIVE DISORDER (HCC): Primary | ICD-10-CM

## 2019-09-17 DIAGNOSIS — B07.8 OTHER VIRAL WARTS: ICD-10-CM

## 2019-09-17 PROCEDURE — 17110 DESTRUCTION B9 LES UP TO 14: CPT | Performed by: FAMILY MEDICINE

## 2019-09-17 PROCEDURE — 3008F BODY MASS INDEX DOCD: CPT | Performed by: FAMILY MEDICINE

## 2019-09-17 PROCEDURE — 99213 OFFICE O/P EST LOW 20 MIN: CPT | Performed by: FAMILY MEDICINE

## 2019-09-17 NOTE — PROGRESS NOTES
Assessment/Plan:      Problem List Items Addressed This Visit        Active Problems    Anxiety    Moderate episode of recurrent major depressive disorder ([facility] [address] ) - Primary      Other Visit Diagnoses     Other viral warts        Relevant Orders    Lesion Destruction - treated today with cryo x 30 seconds  Tolerate well  Total visit time 15 minutes and greater than 50% spent counseling/coordinating care of the patient regarding mood, meds, stressors, coping mechanisms  Patient feels she is doing much better with celexa and with support  Going to group and getting out of house has been helpful   We talked about expectations and ways to help manage these with new baby  Continue celexa 20 mg  Follow up in 2 months, sooner if needed      Patient Instructions   Try a salicylic acid tape/bandage like Mediplast  apply to wart/callus  Leave on 48 - 72 hours  Keep dry, apply waterproof tape if needed  Remove  Restart if needed  Return in about 4 months (around 1/17/2020) for mood check  HPI  Shamar Merchant is a 30 y o  female who presents for follow up on mood  HPI     Follow-up      Additional comments: 1 month mood, wants to review wart on finger  Last edited by Ruba Duggan MA on 9/17/2019  6:07 PM  (History)        refer to Dr Ranjit Echols 2 -3 months after nursing   Today:    No flowsheet data found  No flowsheet data found      PHQ-9 Depression Screening    PHQ-9:    Frequency of the following problems over the past two weeks:       Little interest or pleasure in doing things:  0 - not at all  Feeling down, depressed, or hopeless:  1 - several days  PHQ-2 Score:  1               Has not gotten to meet with the therapist at the support group yet   [facility]  - good to get out of the house   Baby is sleeping a bit better - she is sleeping better too   Now  is getting up at 6:30 to feed the baby and letting her sleep    Now she is not napping and show is not able to do things and gettign frusturated       Lesion Destruction  Date/Time: 9/17/2019 7:34 PM  Performed by: Cullen Rios DO  Authorized by: Cullen Rios DO     Procedure Details - Lesion Destruction:     Number of Lesions:  1  Lesion 1:     Body area:  Upper extremity    Upper extremity location:  R index finger    Initial size (mm):  6    Final defect size (mm):  6    Malignancy: benign lesion      Destruction method: cryotherapy              The following portions of the patient's history were reviewed and updated as appropriate: allergies, current medications, past family history, past medical history, past social history, past surgical history and problem list   Review of Systems   see HPI  Objective:    /80   Pulse 62   Temp (!) 97 2 °F (36 2 °C) (Tympanic)   Resp 16   Ht 5' 2 6" (1 59 m)   Wt 71 2 kg (157 lb)   SpO2 98%   BMI 28 17 kg/m²     Current Outpatient Medications   Medication Sig Dispense Refill    albuterol (PROVENTIL HFA,VENTOLIN HFA) 90 mcg/act inhaler Inhale 2 puffs every 6 (six) hours as needed for wheezing      ascorbic acid (VITAMIN C) 500 mg tablet Take 500 mg by mouth daily      citalopram (CeleXA) 20 mg tablet Take 1 tablet (20 mg total) by mouth daily 30 tablet 1    Prenatal MV-Min-FA-Omega-3 (PRENATAL GUMMIES/DHA & FA) 0 4-32 5 MG CHEW Chew 2 tablets daily       No current facility-administered medications for this visit  Physical Exam:  Constitutional: she appears well-developed and well-nourished  Psychiatric: she has a normal mood and affect  her speech is normal and behavior are normal  Judgment and thought content normal  Cognition and memory are normal    6 mm verrucous lesion right first finger

## 2019-09-17 NOTE — PATIENT INSTRUCTIONS
Try a salicylic acid tape/bandage like Mediplast  apply to wart/callus  Leave on 48 - 72 hours  Keep dry, apply waterproof tape if needed  Remove  Restart if needed

## 2019-09-19 ENCOUNTER — TELEPHONE (OUTPATIENT)
Dept: OBGYN CLINIC | Facility: CLINIC | Age: 24
End: 2019-09-19

## 2019-09-19 DIAGNOSIS — Z30.011 ENCOUNTER FOR INITIAL PRESCRIPTION OF CONTRACEPTIVE PILLS: Primary | ICD-10-CM

## 2019-09-19 RX ORDER — NORETHINDRONE ACETATE AND ETHINYL ESTRADIOL AND FERROUS FUMARATE 1MG-20(24)
1 KIT ORAL DAILY
Qty: 84 TABLET | Refills: 1 | Status: SHIPPED | OUTPATIENT
Start: 2019-09-19 | End: 2019-09-24

## 2019-09-19 NOTE — TELEPHONE ENCOUNTER
10wk PP she is not breast feeding at last 9/10/19 she was ordered Mónica  but it cost about $50 a month wants to know if she can get a different pill  Pharmacy updated  Routing to provider to order

## 2019-09-23 NOTE — TELEPHONE ENCOUNTER
Patient called states Quinn Bruce is still very expensive - advised patient to call insurance and find out what they cover  Verbalized understanding  Routing to provider to update

## 2019-09-24 ENCOUNTER — TELEPHONE (OUTPATIENT)
Dept: OBGYN CLINIC | Facility: CLINIC | Age: 24
End: 2019-09-24

## 2019-09-24 DIAGNOSIS — Z30.011 ENCOUNTER FOR INITIAL PRESCRIPTION OF CONTRACEPTIVE PILLS: Primary | ICD-10-CM

## 2019-09-24 RX ORDER — NORETHINDRONE ACETATE AND ETHINYL ESTRADIOL 1MG-20(21)
1 KIT ORAL DAILY
Qty: 84 TABLET | Refills: 4 | Status: SHIPPED | OUTPATIENT
Start: 2019-09-24

## 2019-09-24 NOTE — TELEPHONE ENCOUNTER
----- Message from Sneha Gonzalez DO sent at 9/24/2019  8:39 AM EDT -----  Regarding: FW: Prescription Question  Contact: [phone]  Sent a new Rx from the list    ----- Message -----  From: Daron Holt RN  Sent: 9/23/2019   4:07 PM EDT  To: Sneha Gonzalez DO  Subject: FW: Prescription Question                        Patient attached list of OCP that is covered by insurance  We have already tried [place] they are not covered  Can you please order one on list     Thank you  ----- Message -----  From: Dariana Israel  Sent: 9/23/2019   3:58 PM EDT  To: [address] [place] [address] Clinical  Subject: Prescription Question                            Here are the list that is fully covered by my insurance

## 2019-09-27 NOTE — PROGRESS NOTES
I have reviewed the notes, assessments, and/or procedures performed by Nathalie Lombardo RN, IBCLC, I concur with her/his documentation of Carlos Eduardo Rojo

## 2019-09-30 ENCOUNTER — SOCIAL WORK (OUTPATIENT)
Dept: BEHAVIORAL/MENTAL HEALTH CLINIC | Facility: CLINIC | Age: 24
End: 2019-09-30
Payer: COMMERCIAL

## 2019-09-30 ENCOUNTER — EVALUATION (OUTPATIENT)
Dept: PHYSICAL THERAPY | Facility: REHABILITATION | Age: 24
End: 2019-09-30
Payer: COMMERCIAL

## 2019-09-30 DIAGNOSIS — F33.1 MODERATE EPISODE OF RECURRENT MAJOR DEPRESSIVE DISORDER (HCC): Primary | ICD-10-CM

## 2019-09-30 DIAGNOSIS — M54.50 LUMBAR PAIN: Primary | ICD-10-CM

## 2019-09-30 DIAGNOSIS — N39.3 STRESS INCONTINENCE OF URINE: ICD-10-CM

## 2019-09-30 PROCEDURE — 97140 MANUAL THERAPY 1/> REGIONS: CPT | Performed by: PHYSICAL THERAPIST

## 2019-09-30 PROCEDURE — 90834 PSYTX W PT 45 MINUTES: CPT | Performed by: SOCIAL WORKER

## 2019-09-30 PROCEDURE — 97112 NEUROMUSCULAR REEDUCATION: CPT | Performed by: PHYSICAL THERAPIST

## 2019-09-30 NOTE — PROGRESS NOTES
I have reviewed the notes, assessments, and/or procedures performed by Orman Simmonds, RN, IBCLC, I concur with her/his documentation of Jorge Alberto Malik

## 2019-09-30 NOTE — PROGRESS NOTES
PT Evaluation     Today's date: 2019  Patient name: Reno Calderon  : 1995  MRN: [id number]  Referring provider: Hector Baez DO  Dx:   Encounter Diagnosis     ICD-10-CM    1  Lumbar pain M54 5    2  Stress incontinence of urine N39 3                   Assessment  Assessment details: Reno Calderon is a 30 y o  female with significant medical history of chronic lumbopelvic pain who recently gave birth to her first child  She presents with chief complaint of lumbar pain and secondary complaint of stress incontinence  Petty Quezada presents with evaluation findings of decreased proximal muscle activation including pelvic floor, TA and multifidus, decreased thoracic mobility  These deficits are manifested functionally in difficulty with returning to fitness activity and episodes of stress incontinence   Petty Quezada will benefit from physical therapy to improve proximal muscle activation, decrease lumbar pain and allow for return to activity without pain or difficulty  Impairments: abnormal muscle firing, activity intolerance, impaired physical strength and pain with function    Symptom irritability: lowUnderstanding of Dx/Px/POC: excellent  Goals  Short Term  1: Patient will be able to maintain TA with perturbations in 4 weeks  2: Pt will be able to demonstrate pelvic floor contraction >10 seconds in 4 weeks  Long Term  1: Patient will demonstrate independence in home exercise program by discharge  2: Patient will be able to return to desired fitness activity by discharge       Plan  Patient would benefit from: skilled physical therapy  Planned therapy interventions: joint mobilization, manual therapy, neuromuscular re-education, patient education, strengthening, stretching, therapeutic exercise, home exercise program, abdominal trunk stabilization and body mechanics training  Frequency: 2x week  Duration in weeks: 12  Treatment plan discussed with: patient        PT Pelvic Floor Subjective:   History of Present Illness:   Pt reports that she went into spontanous labor on 7/15/19  Pt reports that she was in labor for 12 hours  Pushed for 1 5 hours  Pt did sustain a second degree tear and reports she bleed a lot  States for the "most part" it is fine but she still has sensitivity in that area  States she has attempted sex one time since birth and feels like her muscles were weak afterwards, states there was some pain but attributes the pain to lack of lubercation  Pt reports she has had some stress incontince with sneezing  States that she has had mild pubic pain since birth but states it has not been as bad as prior to birth  She did notice some abdominal weakness with quick walking and is generally unsatisfied with her core strength  States she has had some spinal pain in her mid lumbar region states she thinks it is where she received her epidural  Pt does report that she has been having a lot of headaches and thinks it is due to looking down at the baby  Pt would like to start working out again but has not had time to start trying  Babys name is Anya  Baby is formula fed  Goals include strengthening pelvic floor and returning to working out including cardio and lifting  Mechanism of injury: childbirth    Social Support:     Relationship status: /committed    History of Depression: yes    Currently in therapy and support group for post-partum  Diet and Exercise:      Is not currently exercising   Would like to return     Not exercising due to: lack of time  OB/ gyn History    Gestational History:     Number of prior pregnancies: 1    Number of term pregnancies: 1    Delivery Type: vaginal delivery      Menstrual History:      Birth control method: birth control pills  Bladder Function:     Voiding Difficulties negative for: urgency and straining      Voiding Difficulties comments:     Urinary leakage aggravated by: coughing and sneezing  Bowel Function:     Voiding DIfficulties: constipation Bowel Function comments:  " going everyday but not a lot"     Bowel frequency: daily  Sexual Function:     Sexually Active:  Sexually active    Lubrication Use: Yes  Pain:     No pain reported by patient  Objective   Pelvic Floor Exam   Abdominal assessment: Lumbar: pain with extension, hidging noted at TLJ which is the site of lumbar pain  Unable to stabilize to stand on one leg  Hip flexion/abduciton 5/5  Glute activation- fair    (+) prone in stability test  SLR >90*  SI special tests (-)   Thoracic hypomobile  TLJ hypermobile     Diastatis   Diastasis recti present: yes  3" above umbilicus (# fingers): 2  Umbilicus (# fingers): 4  3" below umbilicus (# fingers): 2  Connective tissue integrity at linea alba: boggy  tenderness at linea alba  able to engage transverse abdominis     Skin inspection:   scars present  Number of scars: 2  Scar 1 location: external perineal body  Sensitivity level low   Scar 2 location: internal- bulbocavernousis   Sensitivity level medium Restriction level medium     General Perineum Exam:   perineum intact     Positive for no pelvic organ prolapse at rest    Negative for swelling, lesion, gaping introitus and perianal erythema    Visual Inspection of Perineum:   Excursion of perineal body in cephalad direction with contraction of pelvic floor muscles (PFM): unable  Excursion of perineal body in caudal direction with relaxation of pelvic floor muscles (PFM): weak  Involuntary contraction with coughing: no  Involuntary relaxation with bearing down: no  Cotton swab test: non-tender    Pelvic Organ Prolapse   no pelvic organ prolapse  Position: hook-lying  At rest: none  With bearing down: none    Pelvic Floor Muscle Exam     Palpation   No increased muscle tension in the puborectalis, pubococcygeus, iIliococcygeus, coccygeus and obturator internus  No tenderness on right in the bulbospongiosus, ischiocavernosus and super transverse perineal  Minimal tenderness on left in the ischiocavernosus and super transverse perineal  Moderate tenderness on left in the bulbospongiosus    Muscle Contraction: overflow and improved with cuing  Able to contract with TA, then able to contract isolated  Breathing pattern with contraction: apical  Pelvic floor muscle relaxation is complete       PERFECT Score   Power right: 2+/5  Power left: 2+/5  Endurance (seconds to max): 6  Repetitions (before fatigue): 5  Fast flicks (in 10 seconds): 4               Precautions: standard      Manual  9/30            PF cuing 8'            L sided scar release BO            thoracic grade 5 prone BO                                          Exercise Diary  9/30            TA contraction HEP            PF contraction HEP            Glute contraction HEP            Biofeedback  NV            Multifidus press             Resisted side stepping             Quad with TA                                                                                                                                                                                          Modalities

## 2019-09-30 NOTE — PSYCH
Assessment/Plan:      Diagnoses and all orders for this visit:    Moderate episode of recurrent major depressive disorder (HCC)          Subjective:     Patient ID: Dick Bland is a 30 y o  female  Pt counseled for 50 minutes from 1:10 - 2:00pm   Feels like Celexa helping, enjoys mom's group  Traveling to in laws more now - enjoys family support and thinks about within the yr  Less overemotional - feels like sad/upset about an issues but able to "roll with it "  Feels Anya very fussy lately - teething  Feels like can't wait til  comes home at times to provide relief but handling well  Pt's  not physically affectionate and pt feels like it's getting worse  Feels that he goes into bathroom for 45 minutes at time  Used to help in am but not as much as he will stay asleep  Wedding this weekend for an overnight and one two night trip at end of October - mil watching baby  Feels  does hug her at times but feels not intimate  Trying to set realistic expectations, especially with spouse - often sets high expectations that are unmet  Taking $100-200 of cash out a week - newer this yr and he's typically a saver  Feels  buys a lot of beer and drinks regularly  Pt feels parenting going well but sad and scared about marital changes - encouraged talking about - marital counseling, role played ways to discuss      HPI    Review of Systems      Objective:     Physical Exam  oriented, engaged, sad/calm, full range affect, good eye contact, appropriate speech, denies suicidal/homicidal ideations/desire to harm baby/children, fair insight/judgement

## 2019-10-02 NOTE — PROGRESS NOTES
I have reviewed the notes, assessments, and/or procedures performed by Devan Marquez, RN, IBCLC, I concur with her/his documentation of Jessie Gay

## 2019-10-07 ENCOUNTER — OFFICE VISIT (OUTPATIENT)
Dept: PHYSICAL THERAPY | Facility: REHABILITATION | Age: 24
End: 2019-10-07
Payer: COMMERCIAL

## 2019-10-07 DIAGNOSIS — N39.3 STRESS INCONTINENCE OF URINE: ICD-10-CM

## 2019-10-07 DIAGNOSIS — M54.50 LUMBAR PAIN: Primary | ICD-10-CM

## 2019-10-07 PROCEDURE — 97140 MANUAL THERAPY 1/> REGIONS: CPT | Performed by: PHYSICAL THERAPIST

## 2019-10-07 PROCEDURE — 97112 NEUROMUSCULAR REEDUCATION: CPT | Performed by: PHYSICAL THERAPIST

## 2019-10-07 NOTE — PROGRESS NOTES
Daily Note     Today's date: 10/7/2019  Patient name: Tariq Kaminski  : 1995  MRN: [id number]  Referring provider: Arlene Epsp DO  Dx:   Encounter Diagnosis     ICD-10-CM    1  Lumbar pain M54 5    2  Stress incontinence of urine N39 3                   Subjective: Pt reports that she has been doing HEP but not as much as she would like to secondary to caring for her baby  Pt reports that she had some soreness after last visit that she attributes to the manual therapy but does not feel that it was inappropriate   States back pain remains intermittent  Pt has not had any issues with incontinence since LV  Pt admits throughout the session that she is stressed and anxious and is having difficulty accepting her post-partum body  Objective: See treatment diary below  Assessment: Tolerated treatment well  Patient demonstrated fatigue post treatment and would benefit from continued PT  Biofeedback suggested that the pt has difficulty fully relaxing pelvic floor  This is consistent with palpation  Discussed the importance of bringing mindfulness to her physical stress response and using breath work to try to decrease physical response  Pt shows good initial pelvic floor contraction but has difficulty maintaining contraction strength for a full 10 seconds  Increased L sided tissue tension persists  Plan: Continue per plan of care  Progress treatment as tolerated         Precautions: standard      Manual  9/30 10/7           PF cuing 8'            L sided scar release BO BO           thoracic grade 5 prone BO                                          Exercise Diary  9/30 10/7           TA contraction HEP            PF contraction HEP            Glute contraction HEP            Biofeedback  NV 40'           Multifidus press             Resisted side stepping             Quad with TA Modalities

## 2019-10-10 ENCOUNTER — OFFICE VISIT (OUTPATIENT)
Dept: PHYSICAL THERAPY | Facility: REHABILITATION | Age: 24
End: 2019-10-10
Payer: COMMERCIAL

## 2019-10-10 DIAGNOSIS — N39.3 STRESS INCONTINENCE OF URINE: ICD-10-CM

## 2019-10-10 DIAGNOSIS — M54.50 LUMBAR PAIN: Primary | ICD-10-CM

## 2019-10-10 PROCEDURE — 97140 MANUAL THERAPY 1/> REGIONS: CPT | Performed by: PHYSICAL THERAPIST

## 2019-10-10 PROCEDURE — 97112 NEUROMUSCULAR REEDUCATION: CPT | Performed by: PHYSICAL THERAPIST

## 2019-10-10 NOTE — PROGRESS NOTES
Daily Note     Today's date: 10/10/2019  Patient name: Roberto Moody  : 1995  MRN: [id number]  Referring provider: Aj Feldman DO  Dx:   Encounter Diagnosis     ICD-10-CM    1  Lumbar pain M54 5    2  Stress incontinence of urine N39 3                   Subjective: Pt reports that she has had pain at the thoracolumbar junction  She also has had an episode of L hip pain that has resolved  Objective: See treatment diary below  Assessment: Tolerated treatment well  Patient demonstrated fatigue post treatment and would benefit from continued PT  Pt continues with hypomobility throughout thoracic spine  Showed excellent engagement of TA with cuing  Tension present throughout DR  Denied pain post treatment  Plan: Continue per plan of care  Progress treatment as tolerated         Precautions: standard      Manual  9/30 10/7 10/10          PF cuing 8'            L sided scar release BO BO           thoracic grade 5 prone BO  BO          Paraspinal STM   BO                           Exercise Diary  9/30 10/7 10/10          TA contraction HEP  10 x 3 breaths          PF contraction HEP            Glute contraction HEP            Biofeedback  NV 40'           Multifidus press   BTB 1x10 1x6          Resisted side stepping   YTB 2 laps          Quad with TA             TA with marching   20 each          Bridge   1x10  1x8                                                                                                                                                             Modalities

## 2019-10-14 ENCOUNTER — SOCIAL WORK (OUTPATIENT)
Dept: BEHAVIORAL/MENTAL HEALTH CLINIC | Facility: CLINIC | Age: 24
End: 2019-10-14
Payer: COMMERCIAL

## 2019-10-14 ENCOUNTER — APPOINTMENT (OUTPATIENT)
Dept: PHYSICAL THERAPY | Facility: REHABILITATION | Age: 24
End: 2019-10-14
Payer: COMMERCIAL

## 2019-10-14 DIAGNOSIS — F33.1 MODERATE EPISODE OF RECURRENT MAJOR DEPRESSIVE DISORDER (HCC): Primary | ICD-10-CM

## 2019-10-14 PROCEDURE — 90834 PSYTX W PT 45 MINUTES: CPT | Performed by: SOCIAL WORKER

## 2019-10-14 NOTE — PSYCH
Assessment/Plan:      Diagnoses and all orders for this visit:    Moderate episode of recurrent major depressive disorder (HCC)          Subjective:     Patient ID: Shannan Vail is a 30 y o  female  Pt counseled for 50 minutes from 1:00 - 1:50pm    Pt attempted to address issues with  but feels like no good time   often meets pt with "blank stare," when addresses issues  Pt has anxiety about money although very stable financially - feels  taken advantage of by family - father pays him only 20% of business,  pays for others a lot  Wishes he would be more assertive but when attempting to engage, he ignores  Role played ways to engage, exercises to engage in and how to have focused/productive conversations  Brookamparo Sousa feels that having the baby and postpartum issues have resolved a lot and that her concerns about her relationship are more of a challenge now  Brook Merry will attempt to address feelings/fears about marital changes and get  to realize it's not able anger, more fears about negative marital interactions/feeling like they've become awkward with one another at times        HPI    Review of Systems      Objective:     Physical Exam  oriented, engaged, calm/sad, full range affect, good eye contact, appropriate speech, denies suicidal/homicidal ideations/psychosis/desire to harm baby, fair insight/judgement

## 2019-10-14 NOTE — PROGRESS NOTES
Daily Note     Today's date: 10/14/2019  Patient name: Daniel Matthews  : 1995  MRN: [id number]  Referring provider: Elbert Melo DO  Dx:   No diagnosis found  Subjective: Pt reports that she has had pain at the thoracolumbar junction  She also has had an episode of L hip pain that has resolved  Objective: See treatment diary below  Assessment: Tolerated treatment well  Patient demonstrated fatigue post treatment and would benefit from continued PT  Pt continues with hypomobility throughout thoracic spine  Showed excellent engagement of TA with cuing  Tension present throughout DR  Denied pain post treatment  Plan: Continue per plan of care  Progress treatment as tolerated         Precautions: standard      Manual  9/30 10/7 10/10          PF cuing 8'            L sided scar release BO BO           thoracic grade 5 prone BO  BO          Paraspinal STM   BO                           Exercise Diary  9/30 10/7 10/10          TA contraction HEP  10 x 3 breaths          PF contraction HEP            Glute contraction HEP            Biofeedback  NV 40'           Multifidus press   BTB 1x10 1x6          Resisted side stepping   YTB 2 laps          Quad with TA             TA with marching   20 each          Bridge   1x10  1x8                                                                                                                                                             Modalities

## 2019-10-21 ENCOUNTER — APPOINTMENT (OUTPATIENT)
Dept: PHYSICAL THERAPY | Facility: REHABILITATION | Age: 24
End: 2019-10-21
Payer: COMMERCIAL

## 2019-10-21 NOTE — PROGRESS NOTES
Daily Note     Today's date: 10/21/2019  Patient name: Estephanie Ca  : 1995  MRN: [id number]  Referring provider: Preethi Dickerson DO  Dx:   No diagnosis found  Subjective: Pt reports that she has had pain at the thoracolumbar junction  She also has had an episode of L hip pain that has resolved  Objective: See treatment diary below  Assessment: Tolerated treatment well  Patient demonstrated fatigue post treatment and would benefit from continued PT  Pt continues with hypomobility throughout thoracic spine  Showed excellent engagement of TA with cuing  Tension present throughout DR  Denied pain post treatment  Plan: Continue per plan of care  Progress treatment as tolerated         Precautions: standard      Manual  9/30 10/7 10/10 10/21         PF cuing 8'            L sided scar release BO BO           thoracic grade 5 prone BO  BO          Paraspinal STM   BO                           Exercise Diary  9/30 10/7 10/10 10/21         TA contraction HEP  10 x 3 breaths          PF contraction HEP            Glute contraction HEP            Biofeedback  NV 40'           Multifidus press   BTB 1x10 1x6          Resisted side stepping   YTB 2 laps          Quad with TA             TA with marching   20 each          Bridge   1x10  1x8                                                                                                                                                             Modalities

## 2019-10-23 DIAGNOSIS — F33.1 MODERATE EPISODE OF RECURRENT MAJOR DEPRESSIVE DISORDER (HCC): ICD-10-CM

## 2019-10-23 DIAGNOSIS — F41.9 ANXIETY: ICD-10-CM

## 2019-10-23 NOTE — TELEPHONE ENCOUNTER
Medication:  Celexa 20 mg   Last refilled: 8/14/19  Last Office Visit: 9/17/19  Next Office Visit: 1/21/20  Pharmacy:

## 2019-10-24 ENCOUNTER — OFFICE VISIT (OUTPATIENT)
Dept: PHYSICAL THERAPY | Facility: REHABILITATION | Age: 24
End: 2019-10-24
Payer: COMMERCIAL

## 2019-10-24 DIAGNOSIS — M54.50 LUMBAR PAIN: Primary | ICD-10-CM

## 2019-10-24 DIAGNOSIS — N39.3 STRESS INCONTINENCE OF URINE: ICD-10-CM

## 2019-10-24 PROCEDURE — 97140 MANUAL THERAPY 1/> REGIONS: CPT | Performed by: PHYSICAL THERAPIST

## 2019-10-24 PROCEDURE — 97112 NEUROMUSCULAR REEDUCATION: CPT | Performed by: PHYSICAL THERAPIST

## 2019-10-24 RX ORDER — CITALOPRAM 20 MG/1
20 TABLET ORAL DAILY
Qty: 30 TABLET | Refills: 3 | Status: SHIPPED | OUTPATIENT
Start: 2019-10-24 | End: 2020-02-25 | Stop reason: SDUPTHER

## 2019-10-24 NOTE — PROGRESS NOTES
Daily Note     Today's date: 10/24/2019  Patient name: Chelsey Sarmiento  : 1995  MRN: [id number]  Referring provider: Chirag Caraballo DO  Dx:   Encounter Diagnosis     ICD-10-CM    1  Lumbar pain M54 5    2  Stress incontinence of urine N39 3                   Subjective: Pt reports that she has not had any episodes of incontinence  Objective: See treatment diary below  Assessment: Low pressure fitness initiated today  Pt shows fair activation of abdominal vacuum  Upper quarter fatigue noted with postures  Tolerated treatment well  Patient demonstrated fatigue post treatment and would benefit from continued PT  Plan: Continue per plan of care  Progress treatment as tolerated         Precautions: standard      Manual  9/30 10/7 10/10 10/21 10/24        PF cuing 8'            L sided scar release BO BO           thoracic grade 5 prone BO  BO          Paraspinal STM   BO          Abdominal assessment     8'            Exercise Diary  9/30 10/7 10/10 10/21 10/24        TA contraction HEP  10 x 3 breaths          PF contraction HEP            Glute contraction HEP            Biofeedback  NV 40'           Multifidus press   BTB 1x10 1x6          Resisted side stepping   YTB 2 laps          Quad with TA             TA with marching   20 each          Bridge   1x10  1x8          Vacuum      10'        Series 1     40'                                                                                                                                 Modalities

## 2019-10-28 ENCOUNTER — SOCIAL WORK (OUTPATIENT)
Dept: BEHAVIORAL/MENTAL HEALTH CLINIC | Facility: CLINIC | Age: 24
End: 2019-10-28
Payer: COMMERCIAL

## 2019-10-28 ENCOUNTER — OFFICE VISIT (OUTPATIENT)
Dept: PHYSICAL THERAPY | Facility: REHABILITATION | Age: 24
End: 2019-10-28
Payer: COMMERCIAL

## 2019-10-28 DIAGNOSIS — M54.50 LUMBAR PAIN: Primary | ICD-10-CM

## 2019-10-28 DIAGNOSIS — N39.3 STRESS INCONTINENCE OF URINE: ICD-10-CM

## 2019-10-28 DIAGNOSIS — F33.1 MODERATE EPISODE OF RECURRENT MAJOR DEPRESSIVE DISORDER (HCC): Primary | ICD-10-CM

## 2019-10-28 PROCEDURE — 97112 NEUROMUSCULAR REEDUCATION: CPT | Performed by: PHYSICAL THERAPIST

## 2019-10-28 PROCEDURE — 90834 PSYTX W PT 45 MINUTES: CPT | Performed by: SOCIAL WORKER

## 2019-10-28 PROCEDURE — 97140 MANUAL THERAPY 1/> REGIONS: CPT | Performed by: PHYSICAL THERAPIST

## 2019-10-28 NOTE — PSYCH
Assessment/Plan:      Diagnoses and all orders for this visit:    Moderate episode of recurrent major depressive disorder (HCC)          Subjective:     Patient ID: Dick Bland is a 30 y o  female  Geetha counseled for 50 minutes from 1:00 - 1:50pm     Baby had first ear infection - doing better  Communicated openly with  when arguing - told him that if he wanted to address issues, he would take time to talk  Had an impact - told him she was questioning marriage, staleness, changes with baby -  felt agreed on a lot - was willing open  One sad issues was him saying "This may be a lifeline estrada" related to physical affection  Discussed ways to engage, ask for what she needs and allow time for affection to become more of a habit for her   Ulises helping - less stressed  Brother in [address] [place] [address] over weekend - away 2 days - busy and enjoyed break although sad  Cameron  did not spend as much time with her  Patient and spouse definitely moving near in laws next summer  Provided [employer] phone number for evening marital counseling if  interested  HPI    Review of Systems      Objective:     Physical Exam  oriented, engaged, sad/calm, full range affect, good eye contact, appropriate speech, denies suicidal/homicidal ideations/psychosis/desire to harm daughter, fair insight/judgement  Baby present - positive interaction - rocking/holding baby

## 2019-10-28 NOTE — PROGRESS NOTES
Daily Note     Today's date: 10/28/2019  Patient name: Dick Bland  : 1995  MRN: [id number]  Referring provider: Starr Alberts DO  Dx:   Encounter Diagnosis     ICD-10-CM    1  Lumbar pain M54 5    2  Stress incontinence of urine N39 3                   Subjective: Pt reports that she had fatigue and DOM  following last visit  Objective: See treatment diary below  Assessment: Pt presented with improved rib expansion noted this visit  Thoracic spine hypomobile and mild diaphragm hypomobility noted  Pt shows improved vacuum in a supine position but is unable to obtain in other positioning  Pt continues to fatigue with postural activity  Tolerated treatment well  Patient demonstrated fatigue post treatment and would benefit from continued PT  Plan: Continue per plan of care  Progress treatment as tolerated  Re-assess PF NV        Precautions: standard      Manual  9/30 10/7 10/10 10/21 10/24 10/28       PF cuing 8'            L sided scar release BO BO           thoracic grade 5 prone BO  BO   BO       Paraspinal STM   BO          Abdominal assessment     8'        Diaphragm release      BO           Exercise Diary  9/30 10/7 10/10 10/21 10/24 10/28       TA contraction HEP  10 x 3 breaths          PF contraction HEP            Glute contraction HEP            Biofeedback  NV 40'           Multifidus press   BTB 1x10 1x6          Resisted side stepping   YTB 2 laps          Quad with TA             TA with marching   20 each          Bridge   1x10  1x8          Vacuum      10' 8'       Series 1     40' 40'       BL ER      2x10       LT holds      3x20" each                                                                                                      Modalities

## 2019-11-06 DIAGNOSIS — D50.9 IRON DEFICIENCY ANEMIA, UNSPECIFIED IRON DEFICIENCY ANEMIA TYPE: Primary | ICD-10-CM

## 2019-11-07 RX ORDER — FERROUS SULFATE TAB EC 324 MG (65 MG FE EQUIVALENT) 324 (65 FE) MG
324 TABLET DELAYED RESPONSE ORAL
Qty: 180 TABLET | Refills: 0 | Status: SHIPPED | OUTPATIENT
Start: 2019-11-07 | End: 2019-11-27 | Stop reason: ALTCHOICE

## 2019-11-25 ENCOUNTER — SOCIAL WORK (OUTPATIENT)
Dept: BEHAVIORAL/MENTAL HEALTH CLINIC | Facility: CLINIC | Age: 24
End: 2019-11-25
Payer: COMMERCIAL

## 2019-11-25 DIAGNOSIS — F33.1 MODERATE EPISODE OF RECURRENT MAJOR DEPRESSIVE DISORDER (HCC): Primary | ICD-10-CM

## 2019-11-25 PROCEDURE — 90834 PSYTX W PT 45 MINUTES: CPT | Performed by: SOCIAL WORKER

## 2019-11-25 NOTE — PSYCH
Assessment/Plan:      Diagnoses and all orders for this visit:    Moderate episode of recurrent major depressive disorder (HCC)          Subjective:     Patient ID: Chelsey Sarmiento is a 30 y o  female  Geetha was counseled for 50 minutes from 1:00 - 1:50pm    Baby doing well - 4 5 months old - sleeping through night - eating well - almost 15lbs - start cereal at 5 months  Thanksgiving day at patient's house with her family  Weekend with in laws - big dinner - 60 people  Some stress beforehand but will be ok after - excited to do and have fun - stress about illness near baby  Celexa helping - noticing able to calm down easier  One moment of wanting/feeling like crying but "couldn't "    Argued with  and feels like when her expectations aren't met, she's sad and disappointed  2/2 - 7yr anniversary of mom's death - 7 was mom's number and is now here - feels :something spectacular will happen "   Shakila Rosa and she went out on the 7th, engaged on a 9,  in 2017, baby born in July, found out pregnant on 11/7  Pt has gained a lot of insight - CBt - cognitive distortions and ways to combat - role played assertive communication and ways to clarify vs arguing   has been responding wlel - feels more positive about communication and support      HPI    Review of Systems      Objective:     Physical Exam   oriented, engaged, calm/happy, full range affect, good eye contact, appropriate speech, denies suicidal/homicidal ideations/psychosis/desire to harm baby, fair insight/judgement

## 2019-11-27 ENCOUNTER — OFFICE VISIT (OUTPATIENT)
Dept: FAMILY MEDICINE CLINIC | Facility: CLINIC | Age: 24
End: 2019-11-27
Payer: COMMERCIAL

## 2019-11-27 VITALS
HEART RATE: 90 BPM | TEMPERATURE: 98.2 F | BODY MASS INDEX: 27.82 KG/M2 | WEIGHT: 157 LBS | DIASTOLIC BLOOD PRESSURE: 80 MMHG | OXYGEN SATURATION: 98 % | RESPIRATION RATE: 16 BRPM | SYSTOLIC BLOOD PRESSURE: 122 MMHG | HEIGHT: 63 IN

## 2019-11-27 DIAGNOSIS — E66.3 OVERWEIGHT: ICD-10-CM

## 2019-11-27 DIAGNOSIS — J01.00 ACUTE MAXILLARY SINUSITIS, RECURRENCE NOT SPECIFIED: Primary | ICD-10-CM

## 2019-11-27 DIAGNOSIS — J02.9 PHARYNGITIS, UNSPECIFIED ETIOLOGY: ICD-10-CM

## 2019-11-27 LAB — S PYO AG THROAT QL: NEGATIVE

## 2019-11-27 PROCEDURE — 87880 STREP A ASSAY W/OPTIC: CPT | Performed by: FAMILY MEDICINE

## 2019-11-27 PROCEDURE — 99214 OFFICE O/P EST MOD 30 MIN: CPT | Performed by: FAMILY MEDICINE

## 2019-11-27 RX ORDER — AMOXICILLIN AND CLAVULANATE POTASSIUM 875; 125 MG/1; MG/1
1 TABLET, FILM COATED ORAL EVERY 12 HOURS SCHEDULED
Qty: 20 TABLET | Refills: 0 | Status: SHIPPED | OUTPATIENT
Start: 2019-11-27 | End: 2019-12-07

## 2019-11-27 RX ORDER — FLUTICASONE PROPIONATE 50 MCG
2 SPRAY, SUSPENSION (ML) NASAL DAILY PRN
Qty: 1 BOTTLE | Refills: 0 | Status: SHIPPED | OUTPATIENT
Start: 2019-11-27 | End: 2020-02-25

## 2019-11-27 NOTE — PROGRESS NOTES
Assessment/Plan:  Problem List Items Addressed This Visit        Other    Overweight     Stable  Recommend lifestyle modifications  Plans to start exercising soon  Other Visit Diagnoses     Acute maxillary sinusitis, recurrence not specified    -  Primary    Relevant Medications    amoxicillin-clavulanate (AUGMENTIN) 875-125 mg per tablet    fluticasone (FLONASE) 50 mcg/act nasal spray    Antibiotic Rx for double-sickening and prolonged illness  Advise [facility] Davidson med sinus rinse kit, Mucinex, Claritin/Zyrtec/Allegra, Flonase  Avoid decongestants if you have high blood pressure  Pharyngitis, unspecified etiology        Relevant Orders    POCT rapid strepA (Completed)    Negative strep  PND Likely contributing  Patient declines Magic Mouthwash  Return in about 1 week (around 12/4/2019) for Nurse Visit Flu Vaccine  Future Appointments   Date Time Provider Kendra Pollard   12/2/2019 11:00 AM [facility] NURSE [facility]   12/2/2019  2:00 PM Keke Booth, PT [facility]   12/9/2019 10:00 AM Keke Booth PT [facility]   12/9/2019  1:00  [facility] PSYCH BAB[facility]   12/12/2019  2:00 PM Hanny Damon MD SURG [facility]C [facility]   1/6/2020  1:00 PM Ciaran Shannon PSYCH BABY [facility]   1/21/2020  6:00 PM Jorge Carcamo DO [facility]   2/3/2020  1:00  [facility] PSYCH BABY [facility]        Subjective:     Janeth Parada is a 30 y o  female who presents today for a follow-up on her acute medical conditions  HPI:  Chief Complaint   Patient presents with    Sore Throat     left side, on and off sick x 2 weeks, last 2 days its become severe, mouth hurting  OTC Advil and halls  Cough non productive, stuck in the chest      -- Above per clinical staff and reviewed  --    HPI      Today:      PTO c 35 month old daughter Anya  Not breastfeeding  Plans to get Flu vaccine next week  Overweight - Trying to watch diet    No regular exercise, but plans to use Cardio, lifting machines at home soon  Left-sided Sore throat - Intermittent symptoms x 2 weeks, worse in the past 2 days  +Left Mouth pain - denies blisters  Using Advil 600mg 1-2 times per day and [place] c some benefit   +Fluids  +Eating  No sick contacts  URI - +Non-productive cough - worse in AM and night, no insomnia due to cough, chest congestion  The following portions of the patient's history were reviewed and updated as appropriate: allergies, current medications, past family history, past medical history, past social history, past surgical history and problem list       Review of Systems   Constitutional: Positive for fatigue  Negative for appetite change, chills, diaphoresis and fever  HENT: Positive for sore throat  Negative for mouth sores  Respiratory: Positive for cough  Negative for chest tightness, shortness of breath and wheezing  Cardiovascular: Negative for chest pain  Gastrointestinal: Positive for nausea (Slight, intermittent)  Negative for abdominal pain, diarrhea and vomiting  Genitourinary: Negative for dysuria  Current Outpatient Medications   Medication Sig Dispense Refill    albuterol (PROVENTIL HFA,VENTOLIN HFA) 90 mcg/act inhaler Inhale 2 puffs every 6 (six) hours as needed for wheezing      citalopram (CeleXA) 20 mg tablet Take 1 tablet (20 mg total) by mouth daily 30 tablet 3    norethindrone-ethinyl estradiol (JUNEL FE 1/20) 1-20 MG-MCG per tablet Take 1 tablet by mouth daily 84 tablet 4    Prenatal MV-Min-FA-Omega-3 (PRENATAL GUMMIES/DHA & FA) 0 4-32 5 MG CHEW Chew 2 tablets daily      amoxicillin-clavulanate (AUGMENTIN) 875-125 mg per tablet Take 1 tablet by mouth every 12 (twelve) hours for 10 days 20 tablet 0    fluticasone (FLONASE) 50 mcg/act nasal spray 2 sprays into each nostril daily as needed for rhinitis for up to 10 days 1 Bottle 0     No current facility-administered medications for this visit  Objective:  /80   Pulse 90   Temp 98 2 °F (36 8 °C) (Tympanic)   Resp 16   Ht 5' 2 6" (1 59 m)   Wt 71 2 kg (157 lb)   LMP 11/13/2019 (Within Weeks)   SpO2 98%   Breastfeeding? No   BMI 28 17 kg/m²    Wt Readings from Last 3 Encounters:   11/27/19 71 2 kg (157 lb)   09/17/19 71 2 kg (157 lb)   09/12/19 71 7 kg (158 lb)      BP Readings from Last 3 Encounters:   11/27/19 122/80   09/17/19 116/80   09/12/19 130/82          Physical Exam   Constitutional: She is oriented to person, place, and time  She appears well-developed and well-nourished  HENT:   Head: Normocephalic and atraumatic  Right Ear: Tympanic membrane, external ear and ear canal normal    Left Ear: Tympanic membrane, external ear and ear canal normal    Nose: Mucosal edema present  Right sinus exhibits no maxillary sinus tenderness and no frontal sinus tenderness  Left sinus exhibits maxillary sinus tenderness  Left sinus exhibits no frontal sinus tenderness  Mouth/Throat: Uvula is midline, oropharynx is clear and moist and mucous membranes are normal  No oropharyngeal exudate, posterior oropharyngeal edema, posterior oropharyngeal erythema or tonsillar abscesses  No tonsillar exudate  No oral lesions   +Cobblestoning  Eyes: Conjunctivae are normal    Neck: Neck supple  Cardiovascular: Normal rate, regular rhythm, normal heart sounds and intact distal pulses  Pulmonary/Chest: Effort normal and breath sounds normal    Musculoskeletal: She exhibits no edema or tenderness  Lymphadenopathy:     She has cervical adenopathy (Left anterior - tender, rubbery, mobile)  Neurological: She is alert and oriented to person, place, and time  Skin: No rash noted  Psychiatric: She has a normal mood and affect  Nursing note and vitals reviewed        Lab Results:      Lab Results   Component Value Date    WBC 11 74 (H) 07/16/2019    HGB 6 7 (LL) 07/16/2019    HCT 22 8 (L) 07/16/2019     07/16/2019    ALT 16 07/15/2019 AST 47 (H) 07/15/2019     02/01/2017    K 4 3 07/15/2019     (H) 07/15/2019    CREATININE 0 63 07/15/2019    BUN 8 07/15/2019    CO2 21 07/15/2019     No results found for: URICACID  Invalid input(s): BASENAME Vitamin D    No results found  POCT Labs      BMI Counseling: Body mass index is 28 17 kg/m²  The BMI is above normal  Nutrition recommendations include encouraging healthy choices of fruits and vegetables  Exercise recommendations include exercising 3-5 times per week  No pharmacotherapy was ordered

## 2019-11-27 NOTE — PATIENT INSTRUCTIONS
Advise Man Holcomb med sinus rinse kit, Mucinex, Claritin/Zyrtec/Allegra, Flonase  Avoid decongestants if you have high blood pressure  You may use Motrin (Ibuprofen) up to 800mg 3 times daily with food (in 24 hours) as needed for pain or fever  You may use Tylenol (Acetaminophen) up to 3,000mg daily (in 24 hours) as needed for pain or fever

## 2019-12-02 ENCOUNTER — OFFICE VISIT (OUTPATIENT)
Dept: PHYSICAL THERAPY | Facility: REHABILITATION | Age: 24
End: 2019-12-02
Payer: COMMERCIAL

## 2019-12-02 ENCOUNTER — CLINICAL SUPPORT (OUTPATIENT)
Dept: FAMILY MEDICINE CLINIC | Facility: CLINIC | Age: 24
End: 2019-12-02
Payer: COMMERCIAL

## 2019-12-02 DIAGNOSIS — Z23 ENCOUNTER FOR IMMUNIZATION: ICD-10-CM

## 2019-12-02 DIAGNOSIS — M54.50 LUMBAR PAIN: Primary | ICD-10-CM

## 2019-12-02 DIAGNOSIS — N39.3 STRESS INCONTINENCE OF URINE: ICD-10-CM

## 2019-12-02 PROCEDURE — 90471 IMMUNIZATION ADMIN: CPT

## 2019-12-02 PROCEDURE — 97140 MANUAL THERAPY 1/> REGIONS: CPT | Performed by: PHYSICAL THERAPIST

## 2019-12-02 PROCEDURE — 97110 THERAPEUTIC EXERCISES: CPT | Performed by: PHYSICAL THERAPIST

## 2019-12-02 PROCEDURE — 90686 IIV4 VACC NO PRSV 0.5 ML IM: CPT

## 2019-12-02 NOTE — PROGRESS NOTES
Discharge    Today's date: 2019  Patient name: Brenda Knutson  : 1995  MRN: [id number]  Referring provider: Jonatan Funez DO  Dx:   Encounter Diagnosis     ICD-10-CM    1  Lumbar pain M54 5    2  Stress incontinence of urine N39 3                   Subjective: Pt reports that she is getting over a sinus infection and now she feels like her baby is getting sick  Pt reports that she has been feeling pretty good  States that she has not been working on everything as much as she knows she should  States that overall things have been really crazy  Pt reports that she has not had many pelvic symptoms but continues to have some pain with intercourse  Has not had any back pain or episodes of incontinence  Pts current concerns are pain with sex and DR however she would like to make today her last days secondary to scheduling difficulty  Objective: See treatment diary below  Objective   Pelvic Floor Exam     Diastatis   Diastasis recti present: yes  3" above umbilicus (# fingers): 1  Umbilicus (# fingers): 1  3" below umbilicus (# fingers): 1  Connective tissue integrity at linea alba: boggy and firm  no tenderness at linea alba  unable to engage transverse abdominis     Skin inspection:   scars present  Number of scars: 2  Scar 1 location: external perineal body  Sensitivity level low   Scar 2 location: internal- bulbocavernousis   Sensitivity level medium Restriction level medium     General Perineum Exam:   perineum intact     Negative for swelling, lesion, gaping introitus, no pelvic organ prolapse at rest and perianal erythema    Visual Inspection of Perineum:   Excursion of perineal body in cephalad direction with contraction of pelvic floor muscles (PFM): unable  Excursion of perineal body in caudal direction with relaxation of pelvic floor muscles (PFM): weak  Involuntary contraction with coughing: no  Involuntary relaxation with bearing down: no  Cotton swab test: non-tender    Pelvic Organ Prolapse   no pelvic organ prolapse  Position: hook-lyingno pelvic organ prolapse at rest  With bearing down: none    Pelvic Floor Muscle Exam     Palpation   No increased muscle tension in the puborectalis, pubococcygeus, iIliococcygeus and obturator internus  No tenderness on right in the bulbospongiosus, ischiocavernosus and super transverse perineal  Minimal tenderness on left in the ischiocavernosus and super transverse perineal  Moderate tenderness on left in the bulbospongiosus and coccygeus    Muscle Contraction: overflow  Breathing pattern with contraction: within normal limits and apical  Pelvic floor muscle relaxation is complete  PERFECT Score   Power right: 3+/5  Power left: 3+/5  Endurance (seconds to max): 10  Repetitions (before fatigue): 4  Fast flicks (in 10 seconds): 6        Assessment: Geetha has made improvement since beginning physical therapy  Pt presents currently with significantly improved tension throughout her DR, resolved lumbar pain and resolved incontinence  Despite these gains she continues to present with pelvic floor tightness throughout her left side  Pt education performed on dilator use to continue treatment indepedently  She was able to demonstrate independence in both Kegel and abdominal vacuum this visit       Plan:      Precautions: standard      Manual  9/30 10/7 10/10 10/21 10/24 10/28 12/2      PF cuing 8'            L sided scar release BO BO     BO      thoracic grade 5 prone BO  BO   BO       Paraspinal STM   BO          Abdominal assessment     8'  BO      Diaphragm release      Darlin Evangelista          Exercise Diary  9/30 10/7 10/10 10/21 10/24 10/28 12/2      TA contraction HEP  10 x 3 breaths          PF contraction HEP            Glute contraction HEP            Biofeedback  NV 40'           Multifidus press   BTB 1x10 1x6          Resisted side stepping   YTB 2 laps          Quad with TA             TA with marching   20 each          Bridge   1x10  1x8          Vacuum 10' 8'       Series 1     40' 40'       BL ER      2x10       LT holds      3x20" each       HEP review       10'                                                                                        Modalities

## 2019-12-09 ENCOUNTER — APPOINTMENT (OUTPATIENT)
Dept: PHYSICAL THERAPY | Facility: REHABILITATION | Age: 24
End: 2019-12-09
Payer: COMMERCIAL

## 2019-12-09 ENCOUNTER — SOCIAL WORK (OUTPATIENT)
Dept: BEHAVIORAL/MENTAL HEALTH CLINIC | Facility: CLINIC | Age: 24
End: 2019-12-09
Payer: COMMERCIAL

## 2019-12-09 DIAGNOSIS — F33.1 MODERATE EPISODE OF RECURRENT MAJOR DEPRESSIVE DISORDER (HCC): Primary | ICD-10-CM

## 2019-12-09 PROCEDURE — 90834 PSYTX W PT 45 MINUTES: CPT | Performed by: SOCIAL WORKER

## 2019-12-09 NOTE — PSYCH
Assessment/Plan:      Diagnoses and all orders for this visit:    Moderate episode of recurrent major depressive disorder (HCC)          Subjective:     Patient ID: Daniel Landaverde is a 30 y o  female  Geetha was counseled for 50 minutes from 1:00 - 1:50pm     Toribio King reports baby not sleeping as well, wants to be held - feels less time and long process  Has been asking for help more - sister coming over a lot  Patient cried yesterday then  came home and told her to get out alone for a bit  Otherwise baby doing well - starting cereal next week  Thanksgiving with in laws family/friends went well  Was worried about germs but washed baby's hands a lot - only anxiety and felt positive about it  Xmas jazmin hosted at her house -  helps - "Realizes I stress because I'm a little bit of a control freak," easily frustrated when house gets messier  Wedding on the  - mil watching baby then going out with sister and rosanna  Discussed mom's death 7yrs ago in February - mom taped Night Before Xmas on tape and family listens to it together - family awkward talking about her mom - siblings range from 8yr to 28yr - brother was only 1 when mom   Today is anniversary of being told mom was terminal    Mom left her a tape and she never watched - unsure of how - feeling like it's time now that she has a child  Discussed grief, family dynamics and coping  HPI    Review of Systems      Objective:     Physical Exam  oriented, engaged, sad/calm, full range affect, good eye contact, appropriate speech, denies suicidal/homicidal ideations/psychosis/desire to harm baby, good insight/judgement  Anya present, playing, talking, feeding - positive engagement

## 2019-12-12 ENCOUNTER — OFFICE VISIT (OUTPATIENT)
Dept: SURGICAL ONCOLOGY | Facility: CLINIC | Age: 24
End: 2019-12-12
Payer: COMMERCIAL

## 2019-12-12 VITALS
HEART RATE: 98 BPM | WEIGHT: 158.5 LBS | DIASTOLIC BLOOD PRESSURE: 80 MMHG | SYSTOLIC BLOOD PRESSURE: 110 MMHG | TEMPERATURE: 97.7 F | RESPIRATION RATE: 18 BRPM | HEIGHT: 63 IN | BODY MASS INDEX: 28.08 KG/M2

## 2019-12-12 DIAGNOSIS — Q83.1 ACCESSORY BREAST IN FEMALE: Primary | ICD-10-CM

## 2019-12-12 PROCEDURE — 99213 OFFICE O/P EST LOW 20 MIN: CPT | Performed by: SURGERY
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
## 2019-12-18 ENCOUNTER — OFFICE VISIT (OUTPATIENT)
Dept: SURGICAL ONCOLOGY | Facility: CLINIC | Age: 24
End: 2019-12-18
Payer: COMMERCIAL

## 2019-12-18 VITALS
HEART RATE: 91 BPM | HEIGHT: 63 IN | DIASTOLIC BLOOD PRESSURE: 80 MMHG | BODY MASS INDEX: 27.64 KG/M2 | RESPIRATION RATE: 18 BRPM | TEMPERATURE: 97.2 F | SYSTOLIC BLOOD PRESSURE: 120 MMHG | WEIGHT: 156 LBS

## 2019-12-18 DIAGNOSIS — Q83.1 ACCESSORY BREAST IN FEMALE: Primary | ICD-10-CM

## 2019-12-18 PROCEDURE — 99215 OFFICE O/P EST HI 40 MIN: CPT | Performed by: SURGERY

## 2019-12-18 RX ORDER — CEFAZOLIN SODIUM 1 G/50ML
1000 SOLUTION INTRAVENOUS ONCE
Status: CANCELLED | OUTPATIENT
Start: 2019-12-31 | End: 2019-12-18

## 2019-12-18 RX ORDER — IBUPROFEN 600 MG/1
600 TABLET ORAL EVERY 6 HOURS PRN
Qty: 30 TABLET | Refills: 0 | Status: SHIPPED | OUTPATIENT
Start: 2019-12-18
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
## 2019-12-22 ENCOUNTER — ANESTHESIA EVENT (OUTPATIENT)
Dept: PERIOP | Facility: HOSPITAL | Age: 24
End: 2019-12-22
Payer: COMMERCIAL

## 2019-12-22 RX ORDER — SODIUM CHLORIDE 9 MG/ML
125 INJECTION, SOLUTION INTRAVENOUS CONTINUOUS
Status: CANCELLED | OUTPATIENT
Start: 2019-12-31

## 2019-12-23 ENCOUNTER — APPOINTMENT (OUTPATIENT)
Dept: LAB | Facility: HOSPITAL | Age: 24
End: 2019-12-23
Attending: SURGERY
Payer: COMMERCIAL

## 2019-12-23 ENCOUNTER — APPOINTMENT (OUTPATIENT)
Dept: PREADMISSION TESTING | Facility: HOSPITAL | Age: 24
End: 2019-12-23
Payer: COMMERCIAL

## 2019-12-23 DIAGNOSIS — Q83.1 ACCESSORY BREAST IN FEMALE: ICD-10-CM

## 2019-12-23 LAB
ALBUMIN SERPL BCP-MCNC: 3.6 G/DL (ref 3.5–5)
ALP SERPL-CCNC: 97 U/L (ref 46–116)
ALT SERPL W P-5'-P-CCNC: 18 U/L (ref 12–78)
ANION GAP SERPL CALCULATED.3IONS-SCNC: 9 MMOL/L (ref 4–13)
APTT PPP: 27 SECONDS (ref 23–37)
AST SERPL W P-5'-P-CCNC: 15 U/L (ref 5–45)
BASOPHILS # BLD AUTO: 0.02 THOUSANDS/ΜL (ref 0–0.1)
BASOPHILS NFR BLD AUTO: 0 % (ref 0–1)
BILIRUB SERPL-MCNC: 0.15 MG/DL (ref 0.2–1)
BUN SERPL-MCNC: 13 MG/DL (ref 5–25)
CALCIUM SERPL-MCNC: 9.1 MG/DL (ref 8.3–10.1)
CHLORIDE SERPL-SCNC: 106 MMOL/L (ref 100–108)
CO2 SERPL-SCNC: 25 MMOL/L (ref 21–32)
CREAT SERPL-MCNC: 0.69 MG/DL (ref 0.6–1.3)
EOSINOPHIL # BLD AUTO: 0.04 THOUSAND/ΜL (ref 0–0.61)
EOSINOPHIL NFR BLD AUTO: 1 % (ref 0–6)
ERYTHROCYTE [DISTWIDTH] IN BLOOD BY AUTOMATED COUNT: 15.2 % (ref 11.6–15.1)
GFR SERPL CREATININE-BSD FRML MDRD: 121 ML/MIN/1.73SQ M
GLUCOSE SERPL-MCNC: 99 MG/DL (ref 65–140)
HCT VFR BLD AUTO: 38.4 % (ref 34.8–46.1)
HGB BLD-MCNC: 12.1 G/DL (ref 11.5–15.4)
IMM GRANULOCYTES # BLD AUTO: 0.02 THOUSAND/UL (ref 0–0.2)
IMM GRANULOCYTES NFR BLD AUTO: 0 % (ref 0–2)
INR PPP: 0.99 (ref 0.84–1.19)
LYMPHOCYTES # BLD AUTO: 1.75 THOUSANDS/ΜL (ref 0.6–4.47)
LYMPHOCYTES NFR BLD AUTO: 38 % (ref 14–44)
MCH RBC QN AUTO: 28.1 PG (ref 26.8–34.3)
MCHC RBC AUTO-ENTMCNC: 31.5 G/DL (ref 31.4–37.4)
MCV RBC AUTO: 89 FL (ref 82–98)
MONOCYTES # BLD AUTO: 0.43 THOUSAND/ΜL (ref 0.17–1.22)
MONOCYTES NFR BLD AUTO: 9 % (ref 4–12)
NEUTROPHILS # BLD AUTO: 2.39 THOUSANDS/ΜL (ref 1.85–7.62)
NEUTS SEG NFR BLD AUTO: 52 % (ref 43–75)
NRBC BLD AUTO-RTO: 0 /100 WBCS
PLATELET # BLD AUTO: 237 THOUSANDS/UL (ref 149–390)
PMV BLD AUTO: 10.7 FL (ref 8.9–12.7)
POTASSIUM SERPL-SCNC: 4 MMOL/L (ref 3.5–5.3)
PROT SERPL-MCNC: 7.7 G/DL (ref 6.4–8.2)
PROTHROMBIN TIME: 13.2 SECONDS (ref 11.6–14.5)
RBC # BLD AUTO: 4.31 MILLION/UL (ref 3.81–5.12)
SODIUM SERPL-SCNC: 140 MMOL/L (ref 136–145)
WBC # BLD AUTO: 4.65 THOUSAND/UL (ref 4.31–10.16)

## 2019-12-23 PROCEDURE — 85025 COMPLETE CBC W/AUTO DIFF WBC: CPT

## 2019-12-23 PROCEDURE — 85610 PROTHROMBIN TIME: CPT

## 2019-12-23 PROCEDURE — 80053 COMPREHEN METABOLIC PANEL: CPT

## 2019-12-23 PROCEDURE — 85730 THROMBOPLASTIN TIME PARTIAL: CPT

## 2019-12-23 PROCEDURE — 36415 COLL VENOUS BLD VENIPUNCTURE: CPT

## 2019-12-23 NOTE — ANESTHESIA PREPROCEDURE EVALUATION
Bloating, Abdominal Pain, Finished Prep at 2030 last night  Review of Systems/Medical History  Patient summary reviewed  Chart reviewed  No history of anesthetic complications     Cardiovascular  Negative cardio ROS Exercise tolerance (METS): >4,  No NELSON,    Pulmonary  Not a smoker , Asthma , seasonal/exercise induced Last rescue: > 1 year ago Asthma type of rescue: PRN inhaler, No recent URI ,        GI/Hepatic  Negative GI/hepatic ROS     Comment: gastritis     Negative  ROS        Endo/Other     GYN       Hematology  Anemia ,     Musculoskeletal       Neurology  Seizures (age 1-febrile) well controlled,  Headaches,    Psychology   Anxiety (celexa), Depression ,              Physical Exam    Airway    Mallampati score: I  TM Distance: >3 FB  Neck ROM: full     Dental   No notable dental hx     Cardiovascular  Comment: Negative ROS, Rhythm: regular, Rate: normal, Cardiovascular exam normal    Pulmonary  Pulmonary exam normal Breath sounds clear to auscultation,     Other Findings        Anesthesia Plan  ASA Score- 2     Anesthesia Type- general with ASA Monitors  Additional Monitors:   Airway Plan: LMA  Comment: 11 month old baby girl--not breast feeding now        Plan Factors-Patient not instructed to abstain from smoking on day of procedure       Induction-     Postoperative Plan-     Informed Consent- Anesthetic plan and risks discussed with patient

## 2019-12-23 NOTE — PRE-PROCEDURE INSTRUCTIONS
Pre-Surgery Instructions:   Medication Instructions    albuterol (PROVENTIL HFA,VENTOLIN HFA) 90 mcg/act inhaler Patient was instructed by Physician and understands   citalopram (CeleXA) 20 mg tablet Patient was instructed by Physician and understands   ibuprofen (MOTRIN) 600 mg tablet Patient was instructed by Physician and understands   norethindrone-ethinyl estradiol (JUNEL FE 1/20) 1-20 MG-MCG per tablet Patient was instructed by Physician and understands   Prenatal MV-Min-FA-Omega-3 (PRENATAL GUMMIES/DHA & FA) 0 4-32 5 MG CHEW Patient was instructed by Physician and understands  Patient seen by Charlene Dean  Patient given/ instructed on use of chlorhexidine soap per hospital protocol    Patient instructed to stop all ASA, NSAIDS, vitamins and herbal supplements one week prior to surgery or Dr Moi Duran

## 2019-12-31 ENCOUNTER — HOSPITAL ENCOUNTER (OUTPATIENT)
Facility: HOSPITAL | Age: 24
Setting detail: OUTPATIENT SURGERY
Discharge: HOME | End: 2019-12-31
Attending: SURGERY | Admitting: SURGERY
Payer: COMMERCIAL

## 2019-12-31 ENCOUNTER — ANESTHESIA (OUTPATIENT)
Dept: PERIOP | Facility: HOSPITAL | Age: 24
End: 2019-12-31
Payer: COMMERCIAL

## 2019-12-31 VITALS
HEART RATE: 70 BPM | WEIGHT: 159 LBS | DIASTOLIC BLOOD PRESSURE: 78 MMHG | OXYGEN SATURATION: 96 % | BODY MASS INDEX: 29.26 KG/M2 | RESPIRATION RATE: 15 BRPM | SYSTOLIC BLOOD PRESSURE: 136 MMHG | TEMPERATURE: 98.1 F | HEIGHT: 62 IN

## 2019-12-31 DIAGNOSIS — Q83.1 ACCESSORY BREAST IN FEMALE: Primary | ICD-10-CM

## 2019-12-31 DIAGNOSIS — Q83.1 ACCESSORY BREAST IN FEMALE: ICD-10-CM

## 2019-12-31 LAB
EXT PREGNANCY TEST URINE: NEGATIVE
EXT. CONTROL: NORMAL

## 2019-12-31 PROCEDURE — 88341 IMHCHEM/IMCYTCHM EA ADD ANTB: CPT | Performed by: PATHOLOGY

## 2019-12-31 PROCEDURE — 88305 TISSUE EXAM BY PATHOLOGIST: CPT | Performed by: PATHOLOGY

## 2019-12-31 PROCEDURE — 88342 IMHCHEM/IMCYTCHM 1ST ANTB: CPT | Performed by: PATHOLOGY

## 2019-12-31 PROCEDURE — 19120 REMOVAL OF BREAST LESION: CPT | Performed by: SURGERY

## 2019-12-31 PROCEDURE — 81025 URINE PREGNANCY TEST: CPT | Performed by: ANESTHESIOLOGY

## 2019-12-31 RX ORDER — DEXAMETHASONE SODIUM PHOSPHATE 4 MG/ML
INJECTION, SOLUTION INTRA-ARTICULAR; INTRALESIONAL; INTRAMUSCULAR; INTRAVENOUS; SOFT TISSUE AS NEEDED
Status: DISCONTINUED | OUTPATIENT
Start: 2019-12-31 | End: 2019-12-31 | Stop reason: SURG

## 2019-12-31 RX ORDER — BUPIVACAINE HYDROCHLORIDE 5 MG/ML
INJECTION, SOLUTION PERINEURAL AS NEEDED
Status: DISCONTINUED | OUTPATIENT
Start: 2019-12-31 | End: 2019-12-31 | Stop reason: HOSPADM

## 2019-12-31 RX ORDER — CEFAZOLIN SODIUM 1 G/50ML
1000 SOLUTION INTRAVENOUS ONCE
Status: COMPLETED | OUTPATIENT
Start: 2019-12-31 | End: 2019-12-31

## 2019-12-31 RX ORDER — IBUPROFEN 600 MG/1
600 TABLET ORAL EVERY 6 HOURS PRN
Status: DISCONTINUED | OUTPATIENT
Start: 2019-12-31 | End: 2019-12-31 | Stop reason: HOSPADM

## 2019-12-31 RX ORDER — SODIUM CHLORIDE 9 MG/ML
125 INJECTION, SOLUTION INTRAVENOUS CONTINUOUS
Status: DISCONTINUED | OUTPATIENT
Start: 2019-12-31 | End: 2019-12-31 | Stop reason: HOSPADM

## 2019-12-31 RX ORDER — FENTANYL CITRATE/PF 50 MCG/ML
50 SYRINGE (ML) INJECTION
Status: DISCONTINUED | OUTPATIENT
Start: 2019-12-31 | End: 2019-12-31 | Stop reason: HOSPADM

## 2019-12-31 RX ORDER — FENTANYL CITRATE 50 UG/ML
INJECTION, SOLUTION INTRAMUSCULAR; INTRAVENOUS AS NEEDED
Status: DISCONTINUED | OUTPATIENT
Start: 2019-12-31 | End: 2019-12-31 | Stop reason: SURG

## 2019-12-31 RX ORDER — TRAMADOL HYDROCHLORIDE 50 MG/1
50 TABLET ORAL EVERY 6 HOURS PRN
Status: DISCONTINUED | OUTPATIENT
Start: 2019-12-31 | End: 2019-12-31 | Stop reason: HOSPADM

## 2019-12-31 RX ORDER — MAGNESIUM HYDROXIDE 1200 MG/15ML
LIQUID ORAL AS NEEDED
Status: DISCONTINUED | OUTPATIENT
Start: 2019-12-31 | End: 2019-12-31 | Stop reason: HOSPADM

## 2019-12-31 RX ORDER — ONDANSETRON 2 MG/ML
INJECTION INTRAMUSCULAR; INTRAVENOUS AS NEEDED
Status: DISCONTINUED | OUTPATIENT
Start: 2019-12-31 | End: 2019-12-31 | Stop reason: SURG

## 2019-12-31 RX ORDER — PROPOFOL 10 MG/ML
INJECTION, EMULSION INTRAVENOUS AS NEEDED
Status: DISCONTINUED | OUTPATIENT
Start: 2019-12-31 | End: 2019-12-31 | Stop reason: SURG

## 2019-12-31 RX ORDER — TRAMADOL HYDROCHLORIDE 50 MG/1
50 TABLET ORAL EVERY 8 HOURS PRN
Qty: 9 TABLET | Refills: 0 | Status: SHIPPED | OUTPATIENT
Start: 2019-12-31 | End: 2020-01-23 | Stop reason: HOSPADM

## 2019-12-31 RX ORDER — HYDROMORPHONE HCL/PF 1 MG/ML
0.5 SYRINGE (ML) INJECTION
Status: DISCONTINUED | OUTPATIENT
Start: 2019-12-31 | End: 2019-12-31 | Stop reason: HOSPADM

## 2019-12-31 RX ORDER — MIDAZOLAM HYDROCHLORIDE 2 MG/2ML
INJECTION, SOLUTION INTRAMUSCULAR; INTRAVENOUS AS NEEDED
Status: DISCONTINUED | OUTPATIENT
Start: 2019-12-31 | End: 2019-12-31 | Stop reason: SURG

## 2019-12-31 RX ORDER — ONDANSETRON 2 MG/ML
4 INJECTION INTRAMUSCULAR; INTRAVENOUS ONCE AS NEEDED
Status: DISCONTINUED | OUTPATIENT
Start: 2019-12-31 | End: 2019-12-31 | Stop reason: HOSPADM

## 2019-12-31 RX ADMIN — SODIUM CHLORIDE: 0.9 INJECTION, SOLUTION INTRAVENOUS at 13:19

## 2019-12-31 RX ADMIN — MIDAZOLAM 4 MG: 1 INJECTION INTRAMUSCULAR; INTRAVENOUS at 12:54

## 2019-12-31 RX ADMIN — FENTANYL CITRATE 50 MCG: 50 INJECTION, SOLUTION INTRAMUSCULAR; INTRAVENOUS at 13:08

## 2019-12-31 RX ADMIN — FENTANYL CITRATE 50 MCG: 50 INJECTION, SOLUTION INTRAMUSCULAR; INTRAVENOUS at 13:01

## 2019-12-31 RX ADMIN — LIDOCAINE HYDROCHLORIDE 100 MG: 20 INJECTION, SOLUTION INTRAVENOUS at 12:59

## 2019-12-31 RX ADMIN — FENTANYL CITRATE 50 MCG: 50 INJECTION, SOLUTION INTRAMUSCULAR; INTRAVENOUS at 14:43

## 2019-12-31 RX ADMIN — CEFAZOLIN SODIUM 1000 MG: 1 SOLUTION INTRAVENOUS at 12:54

## 2019-12-31 RX ADMIN — FENTANYL CITRATE 50 MCG: 50 INJECTION, SOLUTION INTRAMUSCULAR; INTRAVENOUS at 13:44

## 2019-12-31 RX ADMIN — IBUPROFEN 600 MG: 600 TABLET ORAL at 16:42

## 2019-12-31 RX ADMIN — PROPOFOL 200 MG: 10 INJECTION, EMULSION INTRAVENOUS at 12:59

## 2019-12-31 RX ADMIN — TRIMETHOBENZAMIDE HYDROCHLORIDE 200 MG: 100 INJECTION INTRAMUSCULAR at 15:47

## 2019-12-31 RX ADMIN — SODIUM CHLORIDE 125 ML/HR: 0.9 INJECTION, SOLUTION INTRAVENOUS at 12:04

## 2019-12-31 RX ADMIN — ONDANSETRON 4 MG: 2 INJECTION INTRAMUSCULAR; INTRAVENOUS at 12:59

## 2019-12-31 RX ADMIN — TRAMADOL HYDROCHLORIDE 50 MG: 50 TABLET, FILM COATED ORAL at 16:43

## 2019-12-31 RX ADMIN — FENTANYL CITRATE 50 MCG: 50 INJECTION, SOLUTION INTRAMUSCULAR; INTRAVENOUS at 13:17

## 2019-12-31 RX ADMIN — FENTANYL CITRATE 50 MCG: 50 INJECTION, SOLUTION INTRAMUSCULAR; INTRAVENOUS at 14:53

## 2019-12-31 RX ADMIN — DEXAMETHASONE SODIUM PHOSPHATE 4 MG: 4 INJECTION, SOLUTION INTRAMUSCULAR; INTRAVENOUS at 12:59

## 2019-12-31 NOTE — DISCHARGE INSTRUCTIONS
POST-OPERATIVE CARE INSTRUCTIONS       Care after your procedure:   General  · Rest and relax for 24 hours, then gradually return to normal activities  · Do not preform any heavy lifting or strenuous physical activities for 14 days  · Your activity restrictions will be re-evaluated at your post op visit  · Drink clear liquids until you are certain there is no nausea, then resume a normal diet  · Do not drink alcohol, drive any vehicle, operate mechanical equipment or make critical decisions for at least 24 hours and until you are off any narcotic pain medications  The Incision  · Your incision is closed with:   dissolvable stiches just underneath the skin  · The incision is also covered with:                          clear waterproof glue  · A gauze-pad is covering the wound  Wound care  · Remove your gauze-pad after 24 hours  · You may then shower using soap and water to clean your incision  Gently dry the wound  · You may redress your wound with additional gauze and tape if you choose  · A little bruising at the wound site is normal     Medication  · Resume all previous medications  · Take Ibuprofen one(1) tablet every 6-8 hours around the clock for the first 2-3 days and then as needed thereafter  Take this even if you don't think you need it for at least the first 24 hours  · Pain Medication Instructions: may use tramadol if needed in addition to the ibuprofen or over the counter tylenol          Other (If applicable)  · Wear a post-surgical bra around the clock  · May use ice to the incision site(s) for the next 24-48 hours, twice daily     Call your  doctor if you have any of the following:  · Redness, swelling, heat, drainage, and/or bleeding from your wound  · Chills or fever ( above 101' F )  · Pain, not relieved with the above medications  · If you have any questions or problems call our office [phone]    Follow-up appointment:  · As scheduled

## 2019-12-31 NOTE — INTERVAL H&P NOTE
H&P reviewed  After examining the patient I find no changes in the patients condition since the H&P had been written      Vitals:    12/31/19 1208   BP: 143/87   Pulse:    Resp:    Temp:    SpO2:

## 2019-12-31 NOTE — OP NOTE
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]    Procedure(s) (LRB):  EXCISION OF ACCESSORY BREAST TISSUE (Bilateral)    Specimen(s):  ID Type Source Tests Collected by Time Destination   1 : Accessory Tissue Left Axilla Tissue Soft Tissue, Other TISSUE EXAM Adarsh Galvan MD 2019 1321    2 : Accessory Tissue Right Axilla Tissue Soft Tissue, Other TISSUE EXAM Adarsh Galvan MD 2019 1342        Estimated Blood Loss:   Minimal    Drains:  * No LDAs found *    Anesthesia Type:   General    Operative Indications:  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]      Operative Findings:  n/a    Complications:   None    Procedure and Technique:  Jai Ryan is a 58-year-old female presented to my office with symptomatic accessory tissue in the bilateral axilla  She requested surgical excision  She presented the day of surgery to the operating room  She had preoperative antibiotics  She was given general anesthesia  She was prepped and draped in the usual standard fashion  Time-out was performed  Attention was turned to the left axilla  10 cc of 0 5% Marcaine plain was injected for supplemental anesthesia  An elliptical incision was created in the left axilla around the visible and palpable accessory tissue  Electrocautery was used to dissect the entire area of accessory tissue  This was sent to pathology in formalin  Her wound was irrigated and hemostasis was achieved  The wound was then closed in a layered fashion using multiple interrupted 3-0 Monocryl suture and a running 4-0 Monocryl subcuticular stitch  Attention was then turned to the right axilla  An additional 10 cc of 0 5% Marcaine plain was injected for supplemental anesthesia    A similar incision was created  Again electrocautery was used to dissect the entire area of accessory tissue  This was sent to pathology in formalin  Her wound was irrigated and hemostasis was achieved  The wound was then closed in a layered fashion using multiple interrupted 3-0 Monocryl suture and a running 4-0 Monocryl subcuticular stitch  The skin was cleaned and dried  Surgical glue, sterile gauze and Tegaderm dressings were applied  The patient was then extubated and taken recovery in stable condition        Patient Disposition:  extubated and stable    SIGNATURE: Elen Junior MD  DATE: December 31, 2019  TIME: 2:35 PM

## 2020-01-13 ENCOUNTER — TELEPHONE (OUTPATIENT)
Dept: SURGICAL ONCOLOGY | Facility: CLINIC | Age: 25
End: 2020-01-13

## 2020-01-13 NOTE — TELEPHONE ENCOUNTER
Pt had called answering service this weekend with concerns regarding of swelling at surgical site  She had spoken with Dr Siddharth Arenas who recommended she apply warm soaks to site  Spoke with her today in follow up  She shares she did not have time to apply the warm soaks as directed  The swelling is firm although she feels it has decreased a bit  She denies any fever, redness or drainage  Spoke with Dr Ryan Gonzáles who recommended pt do warm soaks and keep appt as scheduled  For Wednesday  She will call office if any fever, redness or warmth develop

## 2020-01-15 ENCOUNTER — OFFICE VISIT (OUTPATIENT)
Dept: SURGICAL ONCOLOGY | Facility: CLINIC | Age: 25
End: 2020-01-15

## 2020-01-15 VITALS
DIASTOLIC BLOOD PRESSURE: 70 MMHG | TEMPERATURE: 96.3 F | WEIGHT: 158.2 LBS | RESPIRATION RATE: 18 BRPM | BODY MASS INDEX: 29.11 KG/M2 | SYSTOLIC BLOOD PRESSURE: 120 MMHG | HEART RATE: 80 BPM | HEIGHT: 62 IN

## 2020-01-15 DIAGNOSIS — Q83.1 ACCESSORY BREAST IN FEMALE: Primary | ICD-10-CM

## 2020-01-15 PROCEDURE — 99024 POSTOP FOLLOW-UP VISIT: CPT | Performed by: SURGERY

## 2020-01-15 NOTE — PROGRESS NOTES
30 y o  female is here today s/p bilateral excision of accessory breast tissue    She reports swelling and tenderness at both surgical sites  Her bilateral incision lines are dry, intact and healing  Physical Exam   Constitutional: She appears well-developed and well-nourished  Pulmonary/Chest: Right breast exhibits skin change (well healing incisions with no signs of infection, mild ecchymosis ) and tenderness  Left breast exhibits skin change (well healing incisions with no signs of infection) and tenderness  There is breast swelling (bilateral axillae)  Aspirated 112ccs of serous fluid from the left axilla, aspirated 82 ccs of serosanguinous fluid from the right axilla   Neurological: She is alert  Psychiatric: She has a normal mood and affect  Data:     12/31/19 bilateral excision accessory tissue with fibrocystic changes and compound nevus on the right        Diagnoses and all orders for this visit:    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

## 2020-01-23 ENCOUNTER — OFFICE VISIT (OUTPATIENT)
Dept: SURGICAL ONCOLOGY | Facility: CLINIC | Age: 25
End: 2020-01-23

## 2020-01-23 VITALS
SYSTOLIC BLOOD PRESSURE: 130 MMHG | DIASTOLIC BLOOD PRESSURE: 90 MMHG | TEMPERATURE: 98 F | RESPIRATION RATE: 16 BRPM | BODY MASS INDEX: 29.08 KG/M2 | HEIGHT: 62 IN | HEART RATE: 76 BPM | WEIGHT: 158 LBS

## 2020-01-23 DIAGNOSIS — N64.89 SEROMA OF BREAST: ICD-10-CM

## 2020-01-23 DIAGNOSIS — Q83.1 ACCESSORY BREAST IN FEMALE: Primary | ICD-10-CM

## 2020-01-23 PROCEDURE — 99024 POSTOP FOLLOW-UP VISIT: CPT | Performed by: SURGERY

## 2020-01-23 NOTE — PROGRESS NOTES
30 y o  female is here today s/p excision of bilateral accessory tissue  She reports swelling bilateral, left greater than right  Physical Exam   Constitutional: She appears well-developed and well-nourished  Pulmonary/Chest: Right breast exhibits skin change (Well-healing incision in the axilla with no signs of infection)  Left breast exhibits skin change ( well-healing incision in the axilla with no signs of infection)  There is breast swelling ( bilateral axilla consistent with seromas)  Aspirated 42 cc of serous sanguinous fluid from the right axilla and 80 cc of serous fluid from the left axilla   Neurological: She is alert  Psychiatric: She has a normal mood and affect  Diagnoses and all orders for this visit:    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

## 2020-02-03 ENCOUNTER — SOCIAL WORK (OUTPATIENT)
Dept: BEHAVIORAL/MENTAL HEALTH CLINIC | Facility: CLINIC | Age: 25
End: 2020-02-03
Payer: COMMERCIAL

## 2020-02-03 DIAGNOSIS — F33.1 MODERATE EPISODE OF RECURRENT MAJOR DEPRESSIVE DISORDER (HCC): Primary | ICD-10-CM

## 2020-02-03 PROCEDURE — 1036F TOBACCO NON-USER: CPT | Performed by: SOCIAL WORKER

## 2020-02-03 PROCEDURE — 90834 PSYTX W PT 45 MINUTES: CPT | Performed by: SOCIAL WORKER

## 2020-02-03 NOTE — PSYCH
Assessment/Plan:      Diagnoses and all orders for this visit:    Moderate episode of recurrent major depressive disorder (HCC)          Subjective:     Patient ID: Asia Chang is a 30 y o  female  Geetha was counseled for 50 minutes from 1:00 - 1:50pm    Had breast surgery an went well - draining extra fluid at times  Listing house on March 1st - family helped paint entire house  Looking for houses in [place] near family  Baby clingy lately - only wants patient  7yr anniversary of mom's death yesterday - difficult day  Watched mom's video first time - 5 minutes long  Nice but felt relived some grief - processed grief - developing a positive ritual to celebrate life  Pt sad when  forgot - didn't remind him - reviewed positive communication in relationship, not expecting him to know how she feels - communicating needs openly  Excited about upcoming move, feels postpartum sadness resolved  Anya healthy and doing well - eating some food now    Patient would like to spread out appt to check in and review stress management related to change/communication    HPI    Review of Systems      Objective:     Physical Exam  oriented, engaged, sad/calm, full range affect, good eye contact, appropriate speech, denies suicidal/homicidal ideations/psychosis/desire to harm baby, good insight/judgement

## 2020-02-03 NOTE — PATIENT INSTRUCTIONS
Follow up in 5weeks - Issues resolved but will check in      Continue attending [facility]

## 2020-02-06 ENCOUNTER — OFFICE VISIT (OUTPATIENT)
Dept: SURGICAL ONCOLOGY | Facility: CLINIC | Age: 25
End: 2020-02-06

## 2020-02-06 VITALS
TEMPERATURE: 97.4 F | WEIGHT: 159 LBS | BODY MASS INDEX: 29.26 KG/M2 | SYSTOLIC BLOOD PRESSURE: 110 MMHG | HEIGHT: 62 IN | HEART RATE: 97 BPM | DIASTOLIC BLOOD PRESSURE: 80 MMHG | RESPIRATION RATE: 18 BRPM

## 2020-02-06 DIAGNOSIS — N64.89 SEROMA OF BREAST: Primary | ICD-10-CM

## 2020-02-06 PROCEDURE — 99024 POSTOP FOLLOW-UP VISIT: CPT | Performed by: SURGERY

## 2020-02-06 PROCEDURE — 3008F BODY MASS INDEX DOCD: CPT | Performed by: FAMILY MEDICINE

## 2020-02-06 NOTE — PROGRESS NOTES
30 y o  female is here today s/p bilateral excision of accessory breast tissue on 12/31/19  She reports feeling some swelling in her left axilla  She feels some firmness  in her right axillary incision   Bilateral incision lines are dry, intact and healing  Physical Exam   Constitutional: She appears well-developed and well-nourished  Pulmonary/Chest: Right breast exhibits skin change (Well-healing incision with no signs of infection)  Left breast exhibits skin change ( well-healing incision with no signs of infection)  There is breast swelling ( swelling at medial aspect of the right incision but no appreciable seroma, there is a visible seroma on the left)  Aspirated 32 cc from the left axilla   Neurological: She is alert  Psychiatric: She has a normal mood and affect  Diagnoses and all orders for this visit:    Seroma of breast        Assessment/Plan:  53-year-old female status post excision of bilateral accessory breast tissue  I aspirated the left sided seroma again today in the office  She will not likely need any further aspirations  I advised her to continue using the warm compresses and to start massage to the scar  I will see her on an as-needed basis

## 2020-02-25 ENCOUNTER — TELEPHONE (OUTPATIENT)
Dept: FAMILY MEDICINE CLINIC | Facility: CLINIC | Age: 25
End: 2020-02-25

## 2020-02-25 ENCOUNTER — OFFICE VISIT (OUTPATIENT)
Dept: FAMILY MEDICINE CLINIC | Facility: CLINIC | Age: 25
End: 2020-02-25
Payer: COMMERCIAL

## 2020-02-25 VITALS
TEMPERATURE: 98 F | SYSTOLIC BLOOD PRESSURE: 126 MMHG | OXYGEN SATURATION: 97 % | DIASTOLIC BLOOD PRESSURE: 82 MMHG | HEART RATE: 84 BPM | RESPIRATION RATE: 16 BRPM

## 2020-02-25 DIAGNOSIS — M25.531 RIGHT WRIST PAIN: ICD-10-CM

## 2020-02-25 DIAGNOSIS — F41.9 ANXIETY: ICD-10-CM

## 2020-02-25 DIAGNOSIS — F33.1 MODERATE EPISODE OF RECURRENT MAJOR DEPRESSIVE DISORDER (HCC): ICD-10-CM

## 2020-02-25 DIAGNOSIS — M67.40 GANGLION CYST: ICD-10-CM

## 2020-02-25 DIAGNOSIS — M79.602 LEFT ARM PAIN: Primary | ICD-10-CM

## 2020-02-25 PROCEDURE — 1036F TOBACCO NON-USER: CPT | Performed by: FAMILY MEDICINE

## 2020-02-25 PROCEDURE — 99214 OFFICE O/P EST MOD 30 MIN: CPT | Performed by: FAMILY MEDICINE

## 2020-02-25 RX ORDER — CITALOPRAM 20 MG/1
20 TABLET ORAL
Qty: 90 TABLET | Refills: 1 | Status: SHIPPED | OUTPATIENT
Start: 2020-02-25 | End: 2020-05-25

## 2020-02-25 NOTE — TELEPHONE ENCOUNTER
Patient called stating that she is having right hand and wrist pain  Started a couple months ago but went away, now it came back and is getting worse  She cannot put pressure on her hand, it is very painful  If she flexes her wrist it is painful  Patient's pain level is at a 10  Patient scheduled for this afternoon with Dr Altagraica Peterson

## 2020-02-25 NOTE — TELEPHONE ENCOUNTER
Medication:Celexa  Last office visit:11/27/19  Next office visit:2/25/20  Labs:  Last refilled:10/24/19#30x3  Pharmacy: on file

## 2020-02-25 NOTE — PATIENT INSTRUCTIONS
For constipation in infants try prune, apple, or pear juice - 100% fruit juice  For infants 3- 6months of age you can give 2 - 4 oz per day  For infants 6- 15months of age you can give up to 6 oz per day  You can also substitute barley cereal for rice cereal as it has more fiber  You can offer more high fiber fruits and veggies like apricots, sweet potatoes, pears, prunes, peaches, plums, beans, peas, broccoli, or spinach  You can also mix the fruit juice with the cereal or the fruit or veggie puree

## 2020-02-25 NOTE — PROGRESS NOTES
Assessment/Plan:   Geetha was seen today for hand pain and wrist pain  Diagnoses and all orders for this visit:    Left arm pain  Not clear  Cause of this  Start with PT    -     Ambulatory referral to PT/OT hand therapy; Future  -     Ambulatory referral to Physical Therapy; Future    Right wrist pain  Suspect deQuervain's  Refer to PT/OT  [facility] if they recommend  -     Ambulatory referral to PT/OT hand therapy; Future  -     Ambulatory referral to Physical Therapy; Future    Ganglion cyst  Refer to:  -     Ambulatory referral to Hand Surgery; Future      Advised when she is ready to move we can print chart summary and refill meds to get her started at new doctor  Patient Instructions   For constipation in infants try prune, apple, or pear juice - 100% fruit juice  For infants 3- 6months of age you can give 2 - 4 oz per day  For infants 6- 15months of age you can give up to 6 oz per day  You can also substitute barley cereal for rice cereal as it has more fiber  You can offer more high fiber fruits and veggies like apricots, sweet potatoes, pears, prunes, peaches, plums, beans, peas, broccoli, or spinach  You can also mix the fruit juice with the cereal or the fruit or veggie puree  No follow-ups on file  Subjective:    HPI  Booker Gonzalez is a 30 y o  female who presents with:  Chief Complaint     Hand Pain; Wrist Pain        HPI     Hand Pain      Additional comments: Righ hand, does have movement still  But is painful  Wrist Pain      Additional comments: Wrist pain, on and off a long time  Last edited by Johnny Mills MA on 2/25/2020  2:52 PM  (History)        ---Above per clinical staff & reviewed  ---        Right hand and wrist pain   Has been a while on and off   Was worse then was better and then back   Right handed  okayw ith rest  Worse with trying to do something  Cannot put pressure on wrist  Pain at knuckle now and new bump noted and that is painful   No numbness or tingling  No known injury  Reached with left arm and since then 7/10 pain and cannot straighten out left arm  Pain with movement  Okay at rest  Pain with pressure on the area  Not feeling weak in that area  Doing well with mood  They are probably going to move to [place] in the next 3 months  Concerned about her meds  The following portions of the patient's history were reviewed and updated as appropriate: allergies, current medications, past family history, past medical history, past social history, past surgical history and problem list   Review of Systems  ROS:  all others negative - no chest pain, SOB, normal urine and bowels  no GERD  sleeping well  mood good  Objective:    /82   Pulse 84   Temp 98 °F (36 7 °C) (Tympanic)   Resp 16   SpO2 97%   Wt Readings from Last 3 Encounters:   02/06/20 72 1 kg (159 lb)   01/23/20 71 7 kg (158 lb)   01/15/20 71 8 kg (158 lb 3 2 oz)     BP Readings from Last 3 Encounters:   02/25/20 126/82   02/06/20 110/80   01/23/20 130/90     Current Outpatient Medications   Medication Sig Dispense Refill    albuterol (PROVENTIL HFA,VENTOLIN HFA) 90 mcg/act inhaler Inhale 2 puffs every 6 (six) hours as needed for wheezing      ibuprofen (MOTRIN) 600 mg tablet Take 1 tablet (600 mg total) by mouth every 6 (six) hours as needed for mild pain 30 tablet 0    norethindrone-ethinyl estradiol (JUNEL FE 1/20) 1-20 MG-MCG per tablet Take 1 tablet by mouth daily (Patient taking differently: Take 1 tablet by mouth daily at bedtime ) 84 tablet 4    Prenatal MV-Min-FA-Omega-3 (PRENATAL GUMMIES/DHA & FA) 0 4-32 5 MG CHEW Chew 2 tablets daily      citalopram (CeleXA) 20 mg tablet Take 1 tablet (20 mg total) by mouth daily at bedtime 90 tablet 1     No current facility-administered medications for this visit  Physical Exam   Musculoskeletal:        Right wrist: She exhibits decreased range of motion, tenderness and bony tenderness   She exhibits no swelling, no effusion, no deformity and no laceration  Left upper arm: She exhibits tenderness  She exhibits no bony tenderness  Arms:       Hands:     Constitutional: she appears well-developed and well-nourished  HENT: Head: Normocephalic  Neck: No pain on exam  Neck supple  Neurological: she is alert and oriented to person, place, and time  Skin: Skin is warm and dry  Psychiatric: she has a normal mood and affect  her behavior is normal           BMI Counseling: There is no height or weight on file to calculate BMI  The BMI is above normal  Nutrition recommendations include decreasing portion sizes  Exercise recommendations include exercising 3-5 times per week

## 2020-03-09 ENCOUNTER — SOCIAL WORK (OUTPATIENT)
Dept: BEHAVIORAL/MENTAL HEALTH CLINIC | Facility: CLINIC | Age: 25
End: 2020-03-09
Payer: COMMERCIAL

## 2020-03-09 DIAGNOSIS — F33.1 MODERATE EPISODE OF RECURRENT MAJOR DEPRESSIVE DISORDER (HCC): Primary | ICD-10-CM

## 2020-03-09 PROCEDURE — 1036F TOBACCO NON-USER: CPT | Performed by: SOCIAL WORKER

## 2020-03-09 PROCEDURE — 90834 PSYTX W PT 45 MINUTES: CPT | Performed by: SOCIAL WORKER

## 2020-03-09 NOTE — PSYCH
Assessment/Plan:      Diagnoses and all orders for this visit:    Moderate episode of recurrent major depressive disorder (HCC)          Subjective:     Patient ID: Tanya Jean is a 30 y o  female  Geetha was counseled for 50 minutes from 1:00 - 1:50pm   Realtor at house this morning to list - pictures done this week and listed by end of the week  Bought house in [place] near family/in laws  Inspection tomorrow and closing in April  In laws would help cover other house  Anya is teething and sleeping less  Anxiety about trying to start to conceive - went off pill - took 7 months to conceive Anya  Decided want kids close in age  Thinking about stopping Celexa due to desire to get pregnant  Feels like doing well despite stressors - overall improved and communicating with Justo  more - communication improved  Starting physical therapy related to arm/hand pain  Breast surgery successful - doing ok  May have remove cyst in finger  Reviewed CBT- ways to avoid feeling like everything needs to be perfect, anxiety management, self care, communication  Reviewed improvements and strengths - therapy no longer needed  Would like to continue group attendance now  HPI    Review of Systems      Objective:     Physical Exam  oriented, engaged, calm/happy, full range affect, good eye contact, appropriate speech, denies suicidal/homicidal ideations/psychosis/desire to harm baby, good insight/judgement     Naya present - positive interaction

## 2020-04-17 ENCOUNTER — PATIENT MESSAGE (OUTPATIENT)
Dept: FAMILY MEDICINE CLINIC | Facility: CLINIC | Age: 25
End: 2020-04-17

## 2020-08-26 DIAGNOSIS — F41.9 ANXIETY: ICD-10-CM

## 2020-08-26 DIAGNOSIS — F33.1 MODERATE EPISODE OF RECURRENT MAJOR DEPRESSIVE DISORDER (HCC): ICD-10-CM

## 2020-08-26 RX ORDER — CITALOPRAM 20 MG/1
TABLET ORAL
Qty: 90 TABLET | Refills: 0 | OUTPATIENT
Start: 2020-08-26

## 2020-08-26 NOTE — TELEPHONE ENCOUNTER
Medication refill received from [facility]  Please let the patient know that we do not accept refills directly from the pharmacy

## 2021-03-14 ENCOUNTER — TELEPHONE (OUTPATIENT)
Dept: FAMILY MEDICINE CLINIC | Facility: CLINIC | Age: 26
End: 2021-03-14

## 2022-07-03 NOTE — TELEPHONE ENCOUNTER
Paloma Ron called from [address] requested a call back from on call provider regarding ppt's urine analysis results   Provider paged via [facility] The gold standard for diagnosis is diagnostic laparoscopy which is usually reserved for patients who have persistent pelvic pain despite medical management  There are no tests/bloodwork/etc that would tell us

## 2024-01-29 NOTE — DISCHARGE INSTRUCTIONS
Vaginal Delivery   AMBULATORY CARE:   What you need to know about vaginal delivery:  A vaginal delivery occurs when your baby is born through your vagina (birth canal)  How to prepare for vaginal delivery: You will not be able to eat while you are in active labor  Your healthcare provider can give you medicines for pain relief if you chose to have them  You may need medicine to induce (start) your labor if your labor is not moving forward  You may need to move in bed, stand, or walk to help your baby move into position for birth  What will happen during vaginal delivery:   · You can move into several positions during delivery  You can lie on your back, have your feet up in stirrups, or squat  You may feel pressure on your rectum  This pressure is caused by the movement of your baby's head down the birth canal  You may feel the urge to push  Your healthcare provider will tell you when to push, and guide your baby out of the birth canal  Healthcare providers may use forceps or suction to help deliver your baby  You may also need an episiotomy (incision) to make the vaginal opening larger  This will make more room for your baby  · After your baby is born, your healthcare provider will put clamps on the cord that connects your baby to the placenta  The cord is then cut  Your uterus continues to contract after delivery to push out the placenta  Your healthcare provider may close your episiotomy incision or any tears with stitches  What will happen after vaginal delivery:   · Healthcare providers will examine your baby  You may be able to hold your baby soon after he or she is born  After healthcare providers have checked that you and your baby are okay, you may be taken to another room  · A healthcare provider may massage your abdomen several times to make your uterus firm  This can be uncomfortable   You may have abdominal pains for up to 3 days after you give birth because your uterus is still maria elena  The contractions help release blood from inside your uterus so it shrinks back to its normal size  These contractions may hurt more while you breastfeed your baby  · Your healthcare provider may suggest you get out of bed to sit in a chair or walk  Activity can help prevent blood clots  · You may be able to go home within 24 to 48 hours after delivery  If you need support at home, ask your healthcare provider about home visits by another healthcare provider  This healthcare provider can help you learn about breastfeeding, bottle feeding, baby care, and perineum care  Follow up with your healthcare provider:  Most women need to return 6 weeks after a vaginal delivery  Ask your healthcare provider how to care for your wounds or stitches, if you have them  Write down your questions so you remember to ask them during your visits  Seek care immediately if:   · Your leg feels warm, tender, and painful  It may look swollen and red  · You have a fever  · You are urinating very little, or not at all  · You have heavy vaginal bleeding that fills 1 or more sanitary pads in 1 hour  · You feel weak, dizzy, or faint  Contact your healthcare provider if:   · Your abdominal or perineal pain does not go away, or gets worse  · You feel depressed  · You have questions or concerns about your condition or care  Medicines:  · NSAIDs , such as ibuprofen, help decrease swelling, pain, and fever  This medicine is available with or without a doctor's order  NSAIDs can cause stomach bleeding or kidney problems in certain people  If you take blood thinner medicine, always ask your healthcare provider if NSAIDs are safe for you  Always read the medicine label and follow directions  · Stool softeners  make it easier for you to have a bowel movement  You may need this medicine to treat or prevent constipation  · Take your medicine as directed    Contact your healthcare provider if you think your medicine is not helping or if you have side effects  Tell him or her if you are allergic to any medicine  Keep a list of the medicines, vitamins, and herbs you take  Include the amounts, and when and why you take them  Bring the list or the pill bottles to follow-up visits  Carry your medicine list with you in case of an emergency  Activity:  Rest as much as possible  Try to keep all activities short  You may be able to do some exercise soon after you have your baby  Talk with your healthcare provider before you start exercising  If you work outside the home, ask when you can return to your job  Kegel exercises:  Kegel exercises may help your vaginal and rectal muscles heal faster  You can do Kegel exercises by tightening and relaxing the muscles around your vagina  Kegel exercises help make the muscles stronger  Breast care:  When your milk comes in, your breasts may feel full and hard  Ask how to care for your breasts, even if you are not breastfeeding  Constipation:  You may have constipation for a period of time after you have your baby  Do not try to push the bowel movement out if it is too hard  High-fiber foods and extra liquids can help you prevent constipation  Examples of high-fiber foods are fruit and bran  Prune juice and water are good liquids to drink  You may also be told to take over-the-counter fiber and stool softener medicines  Take these items as directed  Ask how to prevent or treat hemorrhoids  Perineum care: Your perineum is the area between your vagina and anus  Keep the area clean and dry  This will help it heal and prevent infection  Wash the area gently with soap and water when you bathe or shower  Rinse your perineum with warm water after you urinate or have a bowel movement  Your healthcare provider may suggest you use a warm sitz bath to help decrease pain  To take a sitz bath, fill a bathtub with 4 to 6 inches of warm water   You may also use a sitz bath pan that fits inside the toilet  Sit in the sitz bath for 20 minutes  Do this 2 to 3 times a day, or as directed  The warm water can help decrease pain and swelling  Vaginal discharge: You will have vaginal discharge, called lochia, after your delivery  The lochia is red or dark brown with clots for 1 to 3 days after the birth  The amount will decrease and turn pale pink or brown for 3 to 10 days  It will turn white or yellow on the 10th or 14th day  Lochia is usually gone within 3 weeks  Use a sanitary pad rather than a tampon to prevent a vaginal infection  You will have lochia for up to 3 weeks after your baby is born  Monthly periods: Your period may start again within 7 to 9 weeks after your baby is born  If you are breastfeeding, it may take longer for your period to start again  You can still get pregnant again even though you do not have your monthly period  Talk with your healthcare provider about a birth control method if you do not want to get pregnant  Mood changes: Many new mothers have some kind of mood changes after delivery  Some of these changes occur because of lack of sleep, hormone changes, and caring for a new baby  Some mood changes can be more serious, such as postpartum depression  Talk with your healthcare provider if you feel unable to care for yourself or your baby  Sexual activity:  Do not have sex until your healthcare provider says it is okay  You may notice you have a decreased desire for sex, or sex may be painful  You may need to use a vaginal lubricant (gel) to help make sex more comfortable  © 2017 [facility] Information is for End User's use only and may not be sold, redistributed or otherwise used for commercial purposes  All illustrations and images included in CareNotes® are the copyrighted property of [employer] , [employer]  or [employer]  The above information is an  only  It is not intended as medical advice for individual conditions or treatments   Talk to your doctor, nurse or pharmacist before following any medical regimen to see if it is safe and effective for you  Postpartum Bleeding   WHAT YOU NEED TO KNOW:   Postpartum bleeding is vaginal bleeding after childbirth  This bleeding is normal, whether your baby was born vaginally or by   It contains blood and the tissue that lined the inside of your uterus when you were pregnant  DISCHARGE INSTRUCTIONS:   What to expect with postpartum bleeding:  Postpartum bleeding usually lasts at least 10 days, and may last longer than 6 weeks  Your bleeding may range from light (barely staining a pad) to heavy (soaking a pad in 1 hour)  Usually, you have heavy bleeding right after childbirth, which slows over the next few weeks until it stops  The bleeding is red or dark brown with clots for the first 1 to 3 days  It then turns pink for several days, and then becomes a white or yellow discharge until it ends  Follow up with your obstetrician as directed:  Do not have sex until your obstetrician says it is okay  Write down your questions so you remember to ask them during your visits  Contact your healthcare provider or obstetrician if:   · Your bleeding increases, or you have heavy bleeding that soaks 1 pad in 1 hour for 2 hours in a row  · You pass large blood clots  · You are breathing faster than normal, or your heart is beating faster than normal     · You are urinating less than usual, or not at all  · You feel dizzy  · You have questions or concerns about your condition or care  Return to the emergency department if:   · You are suddenly short of breath and feel lightheaded  · You have sudden chest pain  ©  [employer] [employer]  Information is for End User's use only and may not be sold, redistributed or otherwise used for commercial purposes  All illustrations and images included in CareNotes® are the copyrighted property of [employer]  or [employer]    The above information is an  only  It is not intended as medical advice for individual conditions or treatments  Talk to your doctor, nurse or pharmacist before following any medical regimen to see if it is safe and effective for you  Discharged

## (undated) DEVICE — 3M™ MICROFOAM™ TAPE 1528-4: Brand: 3M™ MICROFOAM™

## (undated) DEVICE — PLUMEPEN PRO 10FT

## (undated) DEVICE — SUT MONOCRYL 3-0 SH 27 IN Y416H

## (undated) DEVICE — 3M™ TEGADERM™ CHG DRESSING 25/CARTON 4 CARTONS/CASE 1659: Brand: TEGADERM™

## (undated) DEVICE — CHLORAPREP HI-LITE 26ML ORANGE

## (undated) DEVICE — GAUZE SPONGES,16 PLY: Brand: CURITY

## (undated) DEVICE — SCD SEQUENTIAL COMPRESSION COMFORT SLEEVE MEDIUM KNEE LENGTH: Brand: KENDALL SCD

## (undated) DEVICE — 2000CC GUARDIAN II: Brand: GUARDIAN

## (undated) DEVICE — INTENDED FOR TISSUE SEPARATION, AND OTHER PROCEDURES THAT REQUIRE A SHARP SURGICAL BLADE TO PUNCTURE OR CUT.: Brand: BARD-PARKER SAFETY BLADES SIZE 15, STERILE

## (undated) DEVICE — SUT VICRYL 2-0 SH 27 IN UNDYED J417H

## (undated) DEVICE — TUBING SUCTION 5MM X 12 FT

## (undated) DEVICE — MEDI-VAC YANKAUER SUCTION HANDLE W/BULBOUS AND CONTROL VENT: Brand: CARDINAL HEALTH

## (undated) DEVICE — SUT MONOCRYL 4-0 PS-2 27 IN Y426H

## (undated) DEVICE — GLOVE SRG BIOGEL 6

## (undated) DEVICE — ADHESIVE SKIN HIGH VISCOSITY EXOFIN 1ML

## (undated) DEVICE — BETHLEHEM UNIVERSAL MINOR GEN: Brand: CARDINAL HEALTH